# Patient Record
Sex: MALE | Race: WHITE | NOT HISPANIC OR LATINO | Employment: OTHER | URBAN - METROPOLITAN AREA
[De-identification: names, ages, dates, MRNs, and addresses within clinical notes are randomized per-mention and may not be internally consistent; named-entity substitution may affect disease eponyms.]

---

## 2017-01-04 ENCOUNTER — OFFICE VISIT (OUTPATIENT)
Dept: HEMATOLOGY/ONCOLOGY | Facility: CLINIC | Age: 62
End: 2017-01-04
Payer: MEDICARE

## 2017-01-04 ENCOUNTER — LAB VISIT (OUTPATIENT)
Dept: LAB | Facility: HOSPITAL | Age: 62
End: 2017-01-04
Attending: INTERNAL MEDICINE
Payer: MEDICARE

## 2017-01-04 VITALS
DIASTOLIC BLOOD PRESSURE: 77 MMHG | BODY MASS INDEX: 24.83 KG/M2 | WEIGHT: 163.81 LBS | HEIGHT: 68 IN | HEART RATE: 92 BPM | RESPIRATION RATE: 18 BRPM | SYSTOLIC BLOOD PRESSURE: 131 MMHG | TEMPERATURE: 99 F | OXYGEN SATURATION: 98 %

## 2017-01-04 DIAGNOSIS — C92.00 ACUTE MYELOID LEUKEMIA NOT HAVING ACHIEVED REMISSION: ICD-10-CM

## 2017-01-04 DIAGNOSIS — C92.02 ACUTE MYELOID LEUKEMIA IN RELAPSE: Primary | ICD-10-CM

## 2017-01-04 LAB
ANISOCYTOSIS BLD QL SMEAR: ABNORMAL
BASOPHILS # BLD AUTO: 0.01 K/UL
BASOPHILS NFR BLD: 0.5 %
DIFFERENTIAL METHOD: ABNORMAL
EOSINOPHIL # BLD AUTO: 0.1 K/UL
EOSINOPHIL NFR BLD: 5.4 %
ERYTHROCYTE [DISTWIDTH] IN BLOOD BY AUTOMATED COUNT: 24.9 %
HCT VFR BLD AUTO: 28.5 %
HGB BLD-MCNC: 9.5 G/DL
HYPOCHROMIA BLD QL SMEAR: ABNORMAL
LYMPHOCYTES # BLD AUTO: 1.7 K/UL
LYMPHOCYTES NFR BLD: 80.9 %
MCH RBC QN AUTO: 33.2 PG
MCHC RBC AUTO-ENTMCNC: 33.3 %
MCV RBC AUTO: 100 FL
MONOCYTES # BLD AUTO: 0.1 K/UL
MONOCYTES NFR BLD: 6.9 %
NEUTROPHILS # BLD AUTO: 0.1 K/UL
NEUTROPHILS NFR BLD: 6.3 %
PLATELET # BLD AUTO: 55 K/UL
PLATELET BLD QL SMEAR: ABNORMAL
PMV BLD AUTO: 10.3 FL
POIKILOCYTOSIS BLD QL SMEAR: SLIGHT
POLYCHROMASIA BLD QL SMEAR: ABNORMAL
RBC # BLD AUTO: 2.86 M/UL
SCHISTOCYTES BLD QL SMEAR: PRESENT
WBC # BLD AUTO: 2.04 K/UL

## 2017-01-04 PROCEDURE — 1159F MED LIST DOCD IN RCRD: CPT | Mod: S$GLB,,, | Performed by: INTERNAL MEDICINE

## 2017-01-04 PROCEDURE — 99214 OFFICE O/P EST MOD 30 MIN: CPT | Mod: S$GLB,,, | Performed by: INTERNAL MEDICINE

## 2017-01-04 PROCEDURE — 99999 PR PBB SHADOW E&M-EST. PATIENT-LVL III: CPT | Mod: PBBFAC,,, | Performed by: INTERNAL MEDICINE

## 2017-01-04 NOTE — PROGRESS NOTES
Subjective:       Patient ID: Carlos Jordan Jr. is a 61 y.o. male.    Chief Complaint: No chief complaint on file.    HPI This is a 61-year-old gentleman who comes for follow up of his AML.  He was found to have pancytopenia during a routine cbc done at the ER because of other complains.  A  bone marrow was done on 11/4/2016. 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  He is not interested in discussing a stem cell transplant .  He said he would prefer to b3 treated with a low toxicity agent like  Vidaza which would allow him to be treated as an outpatient.  He comes on C1  d16  He brought to my attention a cyst he has in the back of the neck which he says he has had for many years ALLERGIES: None.      MEDICATIONS: None.      PREVIOUS SURGERIES: Amputation of the tip of the right middle finger.      SOCIAL HISTORY: , has one child. He lives in [place]. He smokes half a  pack a day and has been doing this for 35 years. Denies significant drinking.   He is retired.      FAMILY HISTORY: Brother had brain tumor. Father had diabetes. No heart   attacks.      PAST MEDICAL HISTORY:  1. AML  2. Tobacco use.        Review of Systems    Objective:      Physical Exam   Constitutional: He is oriented to person, place, and time. He appears well-developed and well-nourished.   HENT:   Head: Normocephalic.   Mouth/Throat: No oropharyngeal exudate.   Eyes: Pupils are equal, round, and reactive to light.   Neck: No thyromegaly present.   Cardiovascular: Normal rate, regular rhythm and normal heart sounds.  Exam reveals no gallop.    No murmur heard.  Pulmonary/Chest: No respiratory distress. He has no wheezes. He has no rales.   Abdominal: Soft. Bowel sounds are normal. He exhibits no distension and no mass.  There is no rebound and no guarding.   Musculoskeletal: Normal range of motion. He exhibits no edema.   Lymphadenopathy:     He has no cervical adenopathy.   Neurological: He is alert and oriented to person, place, and time.   Skin: Skin is warm and dry.   Psychiatric: He has a normal mood and affect. His behavior is normal.       Wt Readings from Last 3 Encounters:   01/04/17 74.3 kg (163 lb 12.8 oz)   12/27/16 72.9 kg (160 lb 11.5 oz)   12/21/16 73.9 kg (162 lb 14.7 oz)     Temp Readings from Last 3 Encounters:   01/04/17 98.5 °F (36.9 °C) (Oral)   12/27/16 96.4 °F (35.8 °C) (Oral)   12/22/16 98 °F (36.7 °C)     BP Readings from Last 3 Encounters:   01/04/17 131/77   12/27/16 (!) 140/84   12/23/16 118/76     Pulse Readings from Last 3 Encounters:   01/04/17 92   12/27/16 74   12/23/16 72       Assessment:       1. Acute myeloid leukemia in relapse        Plan:       Lab Results   Component Value Date    WBC 2.04 (L) 01/04/2017    HGB 9.5 (L) 01/04/2017    HCT 28.5 (L) 01/04/2017     (H) 01/04/2017    PLT 55 (L) 01/04/2017     Counts acceptable for day 16.  No need for transfusion  See me in 7 days with a cbc

## 2017-01-11 ENCOUNTER — OFFICE VISIT (OUTPATIENT)
Dept: HEMATOLOGY/ONCOLOGY | Facility: CLINIC | Age: 62
End: 2017-01-11
Payer: MEDICARE

## 2017-01-11 ENCOUNTER — LAB VISIT (OUTPATIENT)
Dept: LAB | Facility: HOSPITAL | Age: 62
End: 2017-01-11
Attending: INTERNAL MEDICINE
Payer: MEDICARE

## 2017-01-11 VITALS
OXYGEN SATURATION: 97 % | BODY MASS INDEX: 24.49 KG/M2 | HEART RATE: 86 BPM | DIASTOLIC BLOOD PRESSURE: 80 MMHG | RESPIRATION RATE: 20 BRPM | WEIGHT: 161.63 LBS | TEMPERATURE: 98 F | HEIGHT: 68 IN | SYSTOLIC BLOOD PRESSURE: 138 MMHG

## 2017-01-11 DIAGNOSIS — C92.02 ACUTE MYELOID LEUKEMIA IN RELAPSE: ICD-10-CM

## 2017-01-11 DIAGNOSIS — C92.00 ACUTE MYELOID LEUKEMIA NOT HAVING ACHIEVED REMISSION: Primary | ICD-10-CM

## 2017-01-11 LAB
ANISOCYTOSIS BLD QL SMEAR: ABNORMAL
BASOPHILS # BLD AUTO: ABNORMAL K/UL
BASOPHILS NFR BLD: 1 %
DIFFERENTIAL METHOD: ABNORMAL
EOSINOPHIL # BLD AUTO: ABNORMAL K/UL
EOSINOPHIL NFR BLD: 1 %
ERYTHROCYTE [DISTWIDTH] IN BLOOD BY AUTOMATED COUNT: ABNORMAL %
HCT VFR BLD AUTO: 27.3 %
HGB BLD-MCNC: 8.9 G/DL
LYMPHOCYTES # BLD AUTO: ABNORMAL K/UL
LYMPHOCYTES NFR BLD: 80 %
MCH RBC QN AUTO: 34 PG
MCHC RBC AUTO-ENTMCNC: 32.6 %
MCV RBC AUTO: 104 FL
MONOCYTES # BLD AUTO: ABNORMAL K/UL
MONOCYTES NFR BLD: 5 %
NEUTROPHILS NFR BLD: 12 %
NEUTS BAND NFR BLD MANUAL: 1 %
PLATELET # BLD AUTO: 96 K/UL
PLATELET BLD QL SMEAR: ABNORMAL
PMV BLD AUTO: 9.6 FL
POIKILOCYTOSIS BLD QL SMEAR: SLIGHT
POLYCHROMASIA BLD QL SMEAR: ABNORMAL
RBC # BLD AUTO: 2.62 M/UL
WBC # BLD AUTO: 1.78 K/UL

## 2017-01-11 PROCEDURE — 99213 OFFICE O/P EST LOW 20 MIN: CPT | Mod: S$GLB,,, | Performed by: INTERNAL MEDICINE

## 2017-01-11 PROCEDURE — 99999 PR PBB SHADOW E&M-EST. PATIENT-LVL III: CPT | Mod: PBBFAC,,, | Performed by: INTERNAL MEDICINE

## 2017-01-11 PROCEDURE — 1159F MED LIST DOCD IN RCRD: CPT | Mod: S$GLB,,, | Performed by: INTERNAL MEDICINE

## 2017-01-11 NOTE — PROGRESS NOTES
Subjective:       Patient ID: Carlos Jordan Jr. is a 61 y.o. male.    Chief Complaint: No chief complaint on file.    HPI This is a 61-year-old gentleman who comes for follow up of his AML.  He was found to have pancytopenia during a routine cbc done at the ER because of other complains.  A bone marrow was done on 11/4/2016. 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  He is not interested in discussing a stem cell transplant .  He said he would prefer to b3 treated with a low toxicity agent like  Vidaza which would allow him to be treated as an outpatient.  He comes on C1  d23     ALLERGIES: None.      MEDICATIONS: None.      PREVIOUS SURGERIES: Amputation of the tip of the right middle finger.      SOCIAL HISTORY: , has one child. He lives in [place]. He smokes half a  pack a day and has been doing this for 35 years. Denies significant drinking.   He is retired.      FAMILY HISTORY: Brother had brain tumor. Father had diabetes. No heart   attacks.      PAST MEDICAL HISTORY:  1. AML  2. Tobacco use.      Review of Systems   Constitutional: Negative.  Negative for fatigue.   Eyes: Negative.    Cardiovascular: Negative.  Negative for chest pain.   Gastrointestinal: Negative for abdominal pain and nausea.   Genitourinary: Negative.  Negative for hematuria.   Musculoskeletal: Negative.    Skin: Negative.    Neurological: Negative.    Psychiatric/Behavioral: Negative.        Objective:      Physical Exam   Constitutional: He is oriented to person, place, and time. He appears well-developed and well-nourished.   HENT:   Head: Normocephalic.   Mouth/Throat: No oropharyngeal exudate.   Eyes: Pupils are equal, round, and reactive to light.   Neck: No thyromegaly present.   Cardiovascular: Normal rate,  regular rhythm and normal heart sounds.  Exam reveals no gallop.    No murmur heard.  Pulmonary/Chest: No respiratory distress. He has no wheezes. He has no rales.   Abdominal: Soft. Bowel sounds are normal. He exhibits no distension and no mass. There is no rebound and no guarding.   Musculoskeletal: Normal range of motion. He exhibits no edema.   Lymphadenopathy:     He has no cervical adenopathy.   Neurological: He is alert and oriented to person, place, and time.   Skin: Skin is warm and dry.   Psychiatric: He has a normal mood and affect. His behavior is normal.       Wt Readings from Last 3 Encounters:   01/11/17 73.3 kg (161 lb 9.6 oz)   01/04/17 74.3 kg (163 lb 12.8 oz)   12/27/16 72.9 kg (160 lb 11.5 oz)     Temp Readings from Last 3 Encounters:   01/11/17 98.1 °F (36.7 °C) (Oral)   01/04/17 98.5 °F (36.9 °C) (Oral)   12/27/16 96.4 °F (35.8 °C) (Oral)     BP Readings from Last 3 Encounters:   01/11/17 138/80   01/04/17 131/77   12/27/16 (!) 140/84     Pulse Readings from Last 3 Encounters:   01/11/17 86   01/04/17 92   12/27/16 74       Assessment:       1. Acute myeloid leukemia not having achieved remission        Plan:       Lab Results   Component Value Date    WBC 1.78 (LL) 01/11/2017    HGB 8.9 (L) 01/11/2017    HCT 27.3 (L) 01/11/2017     (H) 01/11/2017    PLT 96 (L) 01/11/2017       Values low but stable. NO need for transfusion.  See me Monday Jan 16 with a cbc and a cmp for start of c2 of Vidaza

## 2017-01-16 ENCOUNTER — INFUSION (OUTPATIENT)
Dept: INFUSION THERAPY | Facility: HOSPITAL | Age: 62
End: 2017-01-16
Attending: INTERNAL MEDICINE
Payer: MEDICARE

## 2017-01-16 ENCOUNTER — OFFICE VISIT (OUTPATIENT)
Dept: HEMATOLOGY/ONCOLOGY | Facility: CLINIC | Age: 62
End: 2017-01-16
Payer: MEDICARE

## 2017-01-16 VITALS
WEIGHT: 161.63 LBS | DIASTOLIC BLOOD PRESSURE: 70 MMHG | HEART RATE: 81 BPM | OXYGEN SATURATION: 98 % | SYSTOLIC BLOOD PRESSURE: 130 MMHG | BODY MASS INDEX: 24.49 KG/M2 | HEIGHT: 68 IN | TEMPERATURE: 98 F | RESPIRATION RATE: 18 BRPM | BODY MASS INDEX: 24.49 KG/M2 | WEIGHT: 161.63 LBS | HEIGHT: 68 IN

## 2017-01-16 DIAGNOSIS — C92.00 ACUTE MYELOID LEUKEMIA NOT HAVING ACHIEVED REMISSION: Primary | ICD-10-CM

## 2017-01-16 PROCEDURE — 99999 PR PBB SHADOW E&M-EST. PATIENT-LVL III: CPT | Mod: PBBFAC,,, | Performed by: INTERNAL MEDICINE

## 2017-01-16 PROCEDURE — 96401 CHEMO ANTI-NEOPL SQ/IM: CPT | Mod: PO

## 2017-01-16 PROCEDURE — 99214 OFFICE O/P EST MOD 30 MIN: CPT | Mod: 25,S$GLB,, | Performed by: INTERNAL MEDICINE

## 2017-01-16 PROCEDURE — 1159F MED LIST DOCD IN RCRD: CPT | Mod: S$GLB,,, | Performed by: INTERNAL MEDICINE

## 2017-01-16 PROCEDURE — 99499 UNLISTED E&M SERVICE: CPT | Mod: S$GLB,,, | Performed by: INTERNAL MEDICINE

## 2017-01-16 PROCEDURE — 25000003 PHARM REV CODE 250: Mod: PO | Performed by: INTERNAL MEDICINE

## 2017-01-16 PROCEDURE — 63600175 PHARM REV CODE 636 W HCPCS: Mod: JW,PO | Performed by: INTERNAL MEDICINE

## 2017-01-16 RX ORDER — ONDANSETRON 4 MG/1
8 TABLET, FILM COATED ORAL ONCE
Status: CANCELLED
Start: 2017-01-19 | End: 2017-01-19

## 2017-01-16 RX ORDER — AZACITIDINE 100 MG/1
75 INJECTION, POWDER, LYOPHILIZED, FOR SOLUTION INTRAVENOUS; SUBCUTANEOUS
Status: CANCELLED | OUTPATIENT
Start: 2017-01-18

## 2017-01-16 RX ORDER — ONDANSETRON 4 MG/1
8 TABLET, FILM COATED ORAL ONCE
Status: CANCELLED
Start: 2017-01-17 | End: 2017-01-17

## 2017-01-16 RX ORDER — AZACITIDINE 100 MG/1
75 INJECTION, POWDER, LYOPHILIZED, FOR SOLUTION INTRAVENOUS; SUBCUTANEOUS
Status: CANCELLED | OUTPATIENT
Start: 2017-01-19

## 2017-01-16 RX ORDER — AZACITIDINE 100 MG/1
75 INJECTION, POWDER, LYOPHILIZED, FOR SOLUTION INTRAVENOUS; SUBCUTANEOUS
Status: COMPLETED | OUTPATIENT
Start: 2017-01-16 | End: 2017-01-16

## 2017-01-16 RX ORDER — ONDANSETRON 4 MG/1
8 TABLET, FILM COATED ORAL ONCE
Status: COMPLETED | OUTPATIENT
Start: 2017-01-16 | End: 2017-01-16

## 2017-01-16 RX ORDER — AZACITIDINE 100 MG/1
75 INJECTION, POWDER, LYOPHILIZED, FOR SOLUTION INTRAVENOUS; SUBCUTANEOUS
Status: CANCELLED | OUTPATIENT
Start: 2017-01-20

## 2017-01-16 RX ORDER — ONDANSETRON 4 MG/1
8 TABLET, FILM COATED ORAL ONCE
Status: CANCELLED
Start: 2017-01-20 | End: 2017-01-20

## 2017-01-16 RX ORDER — ONDANSETRON 4 MG/1
8 TABLET, FILM COATED ORAL ONCE
Status: CANCELLED
Start: 2017-01-18 | End: 2017-01-18

## 2017-01-16 RX ORDER — AZACITIDINE 100 MG/1
75 INJECTION, POWDER, LYOPHILIZED, FOR SOLUTION INTRAVENOUS; SUBCUTANEOUS
Status: CANCELLED | OUTPATIENT
Start: 2017-01-17

## 2017-01-16 RX ORDER — ONDANSETRON 4 MG/1
8 TABLET, FILM COATED ORAL ONCE
Status: CANCELLED
Start: 2017-01-16 | End: 2017-01-16

## 2017-01-16 RX ORDER — AZACITIDINE 100 MG/1
75 INJECTION, POWDER, LYOPHILIZED, FOR SOLUTION INTRAVENOUS; SUBCUTANEOUS
Status: CANCELLED | OUTPATIENT
Start: 2017-01-16

## 2017-01-16 RX ADMIN — ONDANSETRON 8 MG: 4 TABLET, FILM COATED ORAL at 10:01

## 2017-01-16 RX ADMIN — AZACITIDINE 140 MG: 100 INJECTION, POWDER, LYOPHILIZED, FOR SOLUTION INTRAVENOUS; SUBCUTANEOUS at 11:01

## 2017-01-16 NOTE — PROGRESS NOTES
Subjective:       Patient ID: Carlos Jordan Jr. is a 61 y.o. male.    Chief Complaint: Follow-up    HPI This is a 61-year-old gentleman who comes for follow up of his AML.  He was found to have pancytopenia during a routine cbc done at the ER because of other complains.  A bone marrow was done on 11/4/2016. 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  He said he was  not interested in discussing a stem cell transplant .  He said he would prefer to be treated with a low toxicity agent like Vidaza which would allow him to be treated as an outpatient.  He comes on C2  d1  He has no complaints     ALLERGIES: None.      MEDICATIONS: None.      PREVIOUS SURGERIES: Amputation of the tip of the right middle finger.      SOCIAL HISTORY: , has one child. He lives in [place]. He smokes half a  pack a day and has been doing this for 35 years. Denies significant drinking.   He is retired.      FAMILY HISTORY: Brother had brain tumor. Father had diabetes. No heart   attacks.      PAST MEDICAL HISTORY:  1. AML  2. Tobacco use.      Review of Systems   Constitutional: Negative.  Negative for fatigue.   Eyes: Negative.    Cardiovascular: Negative.  Negative for chest pain.   Gastrointestinal: Negative for abdominal pain and nausea.   Genitourinary: Negative.  Negative for hematuria.   Musculoskeletal: Negative.    Skin: Negative.    Neurological: Negative.    Psychiatric/Behavioral: Negative.        Objective:      Physical Exam   Constitutional: He is oriented to person, place, and time. He appears well-developed and well-nourished.   HENT:   Head: Normocephalic.   Mouth/Throat: No oropharyngeal exudate.   Eyes: Pupils are equal, round, and reactive to light.   Neck: No thyromegaly present.   Cardiovascular:  Normal rate, regular rhythm and normal heart sounds.  Exam reveals no gallop.    No murmur heard.  Pulmonary/Chest: No respiratory distress. He has no wheezes. He has no rales.   Abdominal: Soft. Bowel sounds are normal. He exhibits no distension and no mass. There is no rebound and no guarding.   Musculoskeletal: Normal range of motion. He exhibits no edema.   Lymphadenopathy:     He has no cervical adenopathy.   Neurological: He is alert and oriented to person, place, and time.   Skin: Skin is warm and dry.   Psychiatric: He has a normal mood and affect. His behavior is normal.       Wt Readings from Last 3 Encounters:   01/16/17 73.3 kg (161 lb 9.6 oz)   01/11/17 73.3 kg (161 lb 9.6 oz)   01/04/17 74.3 kg (163 lb 12.8 oz)     Temp Readings from Last 3 Encounters:   01/16/17 98 °F (36.7 °C) (Oral)   01/11/17 98.1 °F (36.7 °C) (Oral)   01/04/17 98.5 °F (36.9 °C) (Oral)     BP Readings from Last 3 Encounters:   01/16/17 130/70   01/11/17 138/80   01/04/17 131/77     Pulse Readings from Last 3 Encounters:   01/16/17 81   01/11/17 86   01/04/17 92       Assessment:       1. Acute myeloid leukemia not having achieved remission        Plan:       Lab Results   Component Value Date    WBC 2.07 (L) 01/16/2017    HGB 10.2 (L) 01/16/2017    HCT 30.3 (L) 01/16/2017     (H) 01/16/2017    PLT 82 (L) 01/16/2017     He will start C2 of Vidaza today and see me in 7 days with a cbc.  No need for transfusions

## 2017-01-16 NOTE — MR AVS SNAPSHOT
Patient Information     Patient Name Sex Carlos Morales Jr. Male 1955      Visit Information        Provider Department Dep Phone Center    2017 11:00 AM [facility] [phone] [facility]      Patient Instructions     None      Your Current Medications Are     ondansetron (ZOFRAN) 8 MG tablet    valacyclovir (VALTREX) 500 MG tablet      Facility-Administered Medications     azacitidine injection 140 mg    ondansetron tablet 8 mg      Appointments for Next Year     2017 11:45 AM INFUSION 030 MIN (30 min.) [facility] CHAIR 01 [facility]    Arrive at check-in approximately 15 minutes before your scheduled appointment time. Bring all outside medical records and imaging, along with a list of your current medications and insurance card.    (off [facility]) [facility] floor    2017 10:00 AM INFUSION 030 MIN (30 min.) [facility] CHAIR 03 ON[facility]    Arrive at check-in approximately 15 minutes before your scheduled appointment time. Bring all outside medical records and imaging, along with a list of your current medications and insurance card.    (off [facility]) [facility] floor    2017 10:00 AM INFUSION 030 MIN (30 min.) [facility] CHAIR 05 ON[facility]    Arrive at check-in approximately 15 minutes before your scheduled appointment time. Bring all outside medical records and imaging, along with a list of your current medications and insurance card.    (off [facility]) [facility] floor    2017 10:00 AM INFUSION 030 MIN (30 min.) [facility] CHAIR 02 ON[facility]    Arrive at check-in approximately 15 minutes before your scheduled appointment time. Bring all outside medical records and imaging, along with a list of your current medications and insurance card.    (off [facility]) [facility] floor    2017 10:45 AM NON FASTING LAB (5 min.) [facility]    Arrive at check-in approximately 15 minutes before your  "scheduled appointment time. Bring all outside medical records and imaging, along with a list of your current medications and insurance card.    (off [address]) 2nd floor    1/23/2017 11:20 AM ESTABLISHED PATIENT (20 min.) [facility]ot[facility] Oncology Lavell Flor MD    Arrive at check-in approximately 15 minutes before your scheduled appointment time. Bring all outside medical records and imaging, along with a list of your current medications and insurance card.    (off [address]) 3rd Floor         Default Flowsheet Data (last 24 hours)      Amb Complex Vitals Rony        01/16/17 1039 01/16/17 1026             Measurements    Weight 73.3 kg (161 lb 9.6 oz) 73.3 kg (161 lb 9.6 oz)       Height 5' 8" (1.727 m) 5' 8" (1.727 m)       BSA (Calculated - sq m) 1.88 sq meters 1.88 sq meters       BMI (Calculated) 24.6 24.6       BP  130/70       Temp  98 °F (36.7 °C)       Pulse  81       Resp  18       SpO2  98 %       Pain Assessment    Pain Score Zero Zero               Allergies     No Known Allergies      Medications You Received from 01/15/2017 1116 to 01/16/2017 1116        Date/Time Order Dose Route Action     01/16/2017 1057 ondansetron tablet 8 mg 8 mg Oral Given      Current Discharge Medication List     Cannot display discharge medications since this is not an admission.      "

## 2017-01-16 NOTE — PLAN OF CARE
Problem: Patient Care Overview (Adult)  Goal: Plan of Care Review  Outcome: Ongoing (interventions implemented as appropriate)  Pt states he feels fine

## 2017-01-16 NOTE — PROGRESS NOTES
Patient, Carlos Jordan Jr. (MRN #[id number]), presented with a recent Platelet count less than 150 K/uL consistent with the definition of thrombocytopenia (ICD10 - D69.6).    Platelets   Date Value Ref Range Status   01/16/2017 82 (L) 150 - 350 K/uL Final     The patient's thrombocytopenia was monitored, evaluated, addressed and/or treated. This addendum to the medical record is made on 01/16/2017.

## 2017-01-17 ENCOUNTER — INFUSION (OUTPATIENT)
Dept: INFUSION THERAPY | Facility: HOSPITAL | Age: 62
End: 2017-01-17
Attending: INTERNAL MEDICINE
Payer: MEDICARE

## 2017-01-17 VITALS — WEIGHT: 161.63 LBS | BODY MASS INDEX: 24.49 KG/M2 | HEIGHT: 68 IN

## 2017-01-17 DIAGNOSIS — C92.00 ACUTE MYELOID LEUKEMIA NOT HAVING ACHIEVED REMISSION: Primary | ICD-10-CM

## 2017-01-17 PROCEDURE — 25000003 PHARM REV CODE 250: Performed by: INTERNAL MEDICINE

## 2017-01-17 PROCEDURE — 96401 CHEMO ANTI-NEOPL SQ/IM: CPT

## 2017-01-17 PROCEDURE — 63600175 PHARM REV CODE 636 W HCPCS: Mod: JW | Performed by: INTERNAL MEDICINE

## 2017-01-17 RX ORDER — ONDANSETRON HYDROCHLORIDE 8 MG/1
8 TABLET, FILM COATED ORAL ONCE
Status: COMPLETED | OUTPATIENT
Start: 2017-01-17 | End: 2017-01-17

## 2017-01-17 RX ORDER — AZACITIDINE 100 MG/1
75 INJECTION, POWDER, LYOPHILIZED, FOR SOLUTION INTRAVENOUS; SUBCUTANEOUS
Status: COMPLETED | OUTPATIENT
Start: 2017-01-17 | End: 2017-01-17

## 2017-01-17 RX ADMIN — ONDANSETRON 8 MG: 8 TABLET, FILM COATED ORAL at 10:01

## 2017-01-17 RX ADMIN — AZACITIDINE 140 MG: 100 INJECTION, POWDER, LYOPHILIZED, FOR SOLUTION INTRAVENOUS; SUBCUTANEOUS at 11:01

## 2017-01-17 NOTE — PLAN OF CARE
"Problem: Patient Care Overview (Adult)  Goal: Plan of Care Review  Outcome: Ongoing (interventions implemented as appropriate)  Pt states only c/o today is "tired".      "

## 2017-01-17 NOTE — NURSING
1/17/17  1200  Injection given without difficulties.  Patient instructed to stay in the clinic for 15 minutes. Patient verbalized understanding and will notify nurse with any complaints.
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
## 2017-01-18 ENCOUNTER — INFUSION (OUTPATIENT)
Dept: INFUSION THERAPY | Facility: HOSPITAL | Age: 62
End: 2017-01-18
Attending: INTERNAL MEDICINE
Payer: MEDICARE

## 2017-01-18 VITALS
DIASTOLIC BLOOD PRESSURE: 66 MMHG | RESPIRATION RATE: 20 BRPM | HEART RATE: 66 BPM | SYSTOLIC BLOOD PRESSURE: 115 MMHG | TEMPERATURE: 98 F

## 2017-01-18 DIAGNOSIS — C92.00 ACUTE MYELOID LEUKEMIA NOT HAVING ACHIEVED REMISSION: Primary | ICD-10-CM

## 2017-01-18 PROCEDURE — 25000003 PHARM REV CODE 250: Performed by: INTERNAL MEDICINE

## 2017-01-18 PROCEDURE — 63600175 PHARM REV CODE 636 W HCPCS: Mod: JW | Performed by: INTERNAL MEDICINE

## 2017-01-18 PROCEDURE — 96401 CHEMO ANTI-NEOPL SQ/IM: CPT

## 2017-01-18 RX ORDER — AZACITIDINE 100 MG/1
75 INJECTION, POWDER, LYOPHILIZED, FOR SOLUTION INTRAVENOUS; SUBCUTANEOUS
Status: COMPLETED | OUTPATIENT
Start: 2017-01-18 | End: 2017-01-18

## 2017-01-18 RX ORDER — ONDANSETRON HYDROCHLORIDE 8 MG/1
8 TABLET, FILM COATED ORAL ONCE
Status: COMPLETED | OUTPATIENT
Start: 2017-01-18 | End: 2017-01-18

## 2017-01-18 RX ADMIN — AZACITIDINE 140 MG: 100 INJECTION, POWDER, LYOPHILIZED, FOR SOLUTION INTRAVENOUS; SUBCUTANEOUS at 10:01

## 2017-01-18 RX ADMIN — ONDANSETRON 8 MG: 8 TABLET, FILM COATED ORAL at 10:01

## 2017-01-18 NOTE — PLAN OF CARE
Problem: Patient Care Overview (Adult)  Goal: Plan of Care Review  Outcome: Ongoing (interventions implemented as appropriate)  States he had a small amount of nausea last night.

## 2017-01-18 NOTE — PATIENT INSTRUCTIONS
[facility]  [address]  [address] [address]  [phone] phone     [phone] fax  Hours of Operation: Monday- Friday 8:00am- 5:00pm  After hours phone  [phone]  Hematology / Oncology Physicians on call      Dr. Tristan Gardiner    Please call with any concerns regarding your appointment today.    HOME CARE AFTER CHEMOTHERAPY   Meals   Many patients feel sick and lose their appetites during treatment. Eat small meals several times a day. Choose bland foods with little taste or smell if you have problems with nausea. Be sure to cook all food thoroughly. This kills bacteria and helps you avoid intestinal infection. Soft foods are easier to swallow and digest.   Activity   Exercise keeps you strong and keeps your heart and lungs active. Talk to your doctor about an appropriate exercise program for you.   Skin Care   To prevent a skin infection, bathe or shower once a day. Use a moisturizing soap and wash with warm water. Avoid very hot or cold water. Chemotherapy can make your skin dry . Apply moisturizing lotion to help relieve dry skin. Some drugs used in high doses can cause slight burns to appear (like sunburn). Ask for a special cream to help relieve the burn and protect your skin.   Prevent Mouth Sores   During chemotherapy, many people get mouth sores. Do the following to help prevent mouth sores or to ease discomfort.   Brush your teeth with a soft-bristle toothbrush after every meal.  Don't use dental floss if your platelet count is below 50,000. Your doctor or nurse will tell you if this is the case.  Use an oral swab or special soft toothbrush if your gums bleed during regular brushing.  Use mouthwash as directed. If you can't tolerate commercial mouthwash, use salt and baking soda to clean your mouth. Mix 1 teaspoon of salt and 1 teaspoon of baking soda into a glass of water. Swish and spit.  Call your doctor or  return to this facility if you develop any of the following:   Sore throat   White patches in the mouth or throat   Fever of 100.4ºF (38ºC) or higher, or as directed by your healthcare provider  © 9322-7762 [employer], [address] [place] [address], [place] [address]. All rights reserved. This information is not intended as a substitute for professional medical care. Always follow your healthcare professional's

## 2017-01-18 NOTE — MR AVS SNAPSHOT
Patient Information     Patient Name Sex Carlos Morales Jr. Male 1955      Visit Information        Provider Department Dept Phone Center    2017 10:00 AM [facility] [phone] O[facility]      Patient Instructions      [facility]  [address]  [address] [facility] [address]  [phone] phone     [phone] fax  Hours of Operation: Monday- Friday 8:00am- 5:00pm  After hours phone  [phone]  Hematology / Oncology Physicians on call      Dr. Tristan Gardiner    Please call with any concerns regarding your appointment today.    HOME CARE AFTER CHEMOTHERAPY   Meals   Many patients feel sick and lose their appetites during treatment. Eat small meals several times a day. Choose bland foods with little taste or smell if you have problems with nausea. Be sure to cook all food thoroughly. This kills bacteria and helps you avoid intestinal infection. Soft foods are easier to swallow and digest.   Activity   Exercise keeps you strong and keeps your heart and lungs active. Talk to your doctor about an appropriate exercise program for you.   Skin Care   To prevent a skin infection, bathe or shower once a day. Use a moisturizing soap and wash with warm water. Avoid very hot or cold water. Chemotherapy can make your skin dry . Apply moisturizing lotion to help relieve dry skin. Some drugs used in high doses can cause slight burns to appear (like sunburn). Ask for a special cream to help relieve the burn and protect your skin.   Prevent Mouth Sores   During chemotherapy, many people get mouth sores. Do the following to help prevent mouth sores or to ease discomfort.   Brush your teeth with a soft-bristle toothbrush after every meal.  Don't use dental floss if your platelet count is below 50,000. Your doctor or nurse will tell you if this is the case.  Use an oral swab or special soft toothbrush if  your gums bleed during regular brushing.  Use mouthwash as directed. If you can't tolerate commercial mouthwash, use salt and baking soda to clean your mouth. Mix 1 teaspoon of salt and 1 teaspoon of baking soda into a glass of water. Swish and spit.  Call your doctor or return to this facility if you develop any of the following:   Sore throat   White patches in the mouth or throat   Fever of 100.4ºF (38ºC) or higher, or as directed by your healthcare provider  © 1863-0243 [facility], [address] [place] [address]. All rights reserved. This information is not intended as a substitute for professional medical care. Always follow your healthcare professional's          Your Current Medications Are     ondansetron (ZOFRAN) 8 MG tablet    valacyclovir (VALTREX) 500 MG tablet      Facility-Administered Medications     azacitidine injection 140 mg    ondansetron tablet 8 mg      Appointments for Next Year     1/19/2017 10:00 AM INFUSION 030 MIN (30 min.) [facility]AIR 05 [facility]    Arrive at check-in approximately 15 minutes before your scheduled appointment time. Bring all outside medical records and imaging, along with a list of your current medications and insurance card.    (off [facility]) 1st floor    1/20/2017 10:00 AM INFUSION 030 MIN (30 min.) [facility] CHAIR 02 ONLH    Arrive at check-in approximately 15 minutes before your scheduled appointment time. Bring all outside medical records and imaging, along with a list of your current medications and insurance card.    (off [facility]) 1st floor    1/23/2017 10:45 AM NON FASTING LAB (5 min.) [facility]    Arrive at check-in approximately 15 minutes before your scheduled appointment time. Bring all outside medical records and imaging, along with a list of your current medications and insurance card.    (off [facility]) 2nd floor    1/23/2017 11:20 AM ESTABLISHED PATIENT (20  min.) [facility] Oncology Lavell Flor MD    Arrive at check-in approximately 15 minutes before your scheduled appointment time. Bring all outside medical records and imaging, along with a list of your current medications and insurance card.    (off [facility]) 3rd Floor         Default Flowsheet Data (last 24 hours)      Amb Complex Vitals Rony        01/18/17 0958 01/18/17 0946             Measurements    /66        Temp 98.2 °F (36.8 °C)        Pulse 66        Resp 20        Pain Assessment    Pain Score  Two       Pain Frequency 2  Continuous       Pain Loc  ABDOMEN   injection sites               Allergies     No Known Allergies      Medications You Received from 01/17/2017 1428 to 01/18/2017 1428        Date/Time Order Dose Route Action     01/18/2017 1038 azacitidine injection 140 mg 140 mg Subcutaneous Given     01/18/2017 1021 ondansetron tablet 8 mg 8 mg Oral Given      Current Discharge Medication List     Cannot display discharge medications since this is not an admission.      Follow-up and Disposition     Return in 1 day (on 1/19/2017).

## 2017-01-19 ENCOUNTER — INFUSION (OUTPATIENT)
Dept: INFUSION THERAPY | Facility: HOSPITAL | Age: 62
End: 2017-01-19
Attending: INTERNAL MEDICINE
Payer: MEDICARE

## 2017-01-19 VITALS
HEART RATE: 68 BPM | DIASTOLIC BLOOD PRESSURE: 73 MMHG | BODY MASS INDEX: 24.49 KG/M2 | RESPIRATION RATE: 20 BRPM | TEMPERATURE: 98 F | HEIGHT: 68 IN | WEIGHT: 161.63 LBS | SYSTOLIC BLOOD PRESSURE: 121 MMHG

## 2017-01-19 DIAGNOSIS — C92.00 ACUTE MYELOID LEUKEMIA NOT HAVING ACHIEVED REMISSION: Primary | ICD-10-CM

## 2017-01-19 PROCEDURE — 96401 CHEMO ANTI-NEOPL SQ/IM: CPT

## 2017-01-19 PROCEDURE — 63600175 PHARM REV CODE 636 W HCPCS: Mod: JW | Performed by: INTERNAL MEDICINE

## 2017-01-19 PROCEDURE — 25000003 PHARM REV CODE 250: Performed by: INTERNAL MEDICINE

## 2017-01-19 RX ORDER — AZACITIDINE 100 MG/1
75 INJECTION, POWDER, LYOPHILIZED, FOR SOLUTION INTRAVENOUS; SUBCUTANEOUS
Status: COMPLETED | OUTPATIENT
Start: 2017-01-19 | End: 2017-01-19

## 2017-01-19 RX ORDER — ONDANSETRON HYDROCHLORIDE 8 MG/1
8 TABLET, FILM COATED ORAL ONCE
Status: COMPLETED | OUTPATIENT
Start: 2017-01-19 | End: 2017-01-19

## 2017-01-19 RX ADMIN — ONDANSETRON 8 MG: 8 TABLET, FILM COATED ORAL at 10:01

## 2017-01-19 RX ADMIN — AZACITIDINE 140 MG: 100 INJECTION, POWDER, LYOPHILIZED, FOR SOLUTION INTRAVENOUS; SUBCUTANEOUS at 10:01

## 2017-01-19 NOTE — PLAN OF CARE
Problem: Patient Care Overview  Goal: Plan of Care Review  Outcome: Ongoing (interventions implemented as appropriate)  Pt states that he feels good but that his abdomen is hurting from the injections.

## 2017-01-19 NOTE — PATIENT INSTRUCTIONS
[facility]  [address]  [address] [address]  [phone] phone     [phone] fax  Hours of Operation: Monday- Friday 8:00am- 5:00pm  After hours phone  [phone]  Hematology / Oncology Physicians on call      Dr. Tristan Gardiner    Please call with any concerns regarding your appointment today.    FALL PREVENTION   Falls often occur due to slipping, tripping or losing your balance. Here are ways to reduce your risk of falling again.   Was there anything that caused your fall that can be fixed, removed or replaced?   Make your home safe by keeping walkways clear of objects you may trip over.   Use non-slip pads under rugs.   Do not walk in poorly lit areas.   Do not stand on chairs or wobbly ladders.   Use caution when reaching overhead or looking upward. This position can cause a loss of balance.   Be sure your shoes fit properly, have non-slip bottoms and are in good condition.   Be cautious when going up and down stairs, curbs, and when walking on uneven sidewalks.   If your balance is poor, consider using a cane or walker.   If your fall was related to alcohol use, stop or limit alcohol intake.   If your fall was related to use of sleeping medicines, talk to your doctor about this. You may need to reduce your dosage at bedtime if you awaken during the night to go to the bathroom.   To reduce the need for nighttime bathroom trips:   Avoid drinking fluids for several hours before going to bed   Empty your bladder before going to bed   Men can keep a urinal at the bedside   © 2825-0940 [employer], [address] [place] [address]. All rights reserved. This information is not intended as a substitute for professional medical care. Always follow your healthcare professional's instructions.    HOME CARE AFTER CHEMOTHERAPY   Meals   Many patients feel sick and lose their appetites during treatment. Eat small meals  several times a day. Choose bland foods with little taste or smell if you have problems with nausea. Be sure to cook all food thoroughly. This kills bacteria and helps you avoid intestinal infection. Soft foods are easier to swallow and digest.   Activity   Exercise keeps you strong and keeps your heart and lungs active. Talk to your doctor about an appropriate exercise program for you.   Skin Care   To prevent a skin infection, bathe or shower once a day. Use a moisturizing soap and wash with warm water. Avoid very hot or cold water. Chemotherapy can make your skin dry . Apply moisturizing lotion to help relieve dry skin. Some drugs used in high doses can cause slight burns to appear (like sunburn). Ask for a special cream to help relieve the burn and protect your skin.   Prevent Mouth Sores   During chemotherapy, many people get mouth sores. Do the following to help prevent mouth sores or to ease discomfort.   Brush your teeth with a soft-bristle toothbrush after every meal.  Don't use dental floss if your platelet count is below 50,000. Your doctor or nurse will tell you if this is the case.  Use an oral swab or special soft toothbrush if your gums bleed during regular brushing.  Use mouthwash as directed. If you can't tolerate commercial mouthwash, use salt and baking soda to clean your mouth. Mix 1 teaspoon of salt and 1 teaspoon of baking soda into a glass of water. Swish and spit.  Call your doctor or return to this facility if you develop any of the following:   Sore throat   White patches in the mouth or throat   Fever of 100.4ºF (38ºC) or higher, or as directed by your healthcare provider  © 6157-1526 [employer], [address] [place] [address], [place] [address]. All rights reserved. This information is not intended as a substitute for professional medical care. Always follow your healthcare professional's       YOU HAVE STARTED ON CHEMOTHERAPY. IF YOU EXPERIENCE ANY OF THE FOLLOWING PROBLEMS, CALL THE OFFICE  IMMEDIATELY.    *FEVER .0 OR GREATER    *CHILLS, ESPECIALLY SHAKING CHILLS, OR SWEATING    *A SEVERE COUGH OR SORE THROAT, OR SINUS PAIN/     PRESSURE    *REDNESS, SWELLING, OR TENDERNESS AROUND A WOUND,     SORE, PIMPLE, RECTAL AREA, OR IV SITE    *SORES OR ULCERS IN THE MOUTH    *BLISTERS ON THE LIPS OR SKIN    *FREQUENT URGENCY TO URINATE OR A BURNING FEELING   WHEN YOU URINATE    *BLOOD IN THE URINE OR STOOL    *ANY UNEXPLAINED BRUISING OR PROLONGED BLEEDING,     (NOSEBLEEDS OR BLEEDING GUMS)    *LOOSE BOWEL MOVEMENTS THAT DO NOT RESPOND TO     IMODIUM OR MORE THAN THREE TIMES A DAY    *VOMITING UNRESPONSIVE TO ANTINAUSEA MEDICINE    *ANY UNUSUAL PHYSICAL SYMPTOMS THAT BEGAN AFTER     CHEMOTHERAPY    DURING WEEKDAYS, CALL AND ASK TO SPEAK DIRECTLY TO A NURSE.  AT OTHER TIMES, CALL THE OFFICE PHONE NUMBER; THE ANSWERING SERVICE WILL CONTACT THE ON-CALL PHYSICIAN.  SOMEONE IS AVAILABLE 24 HOURS A DAY, SEVEN DAYS A WEEK.

## 2017-01-19 NOTE — MR AVS SNAPSHOT
Patient Information     Patient Name Sex Carlos Morales Jr. Male 1955      Visit Information        Provider Department Dept Phone Center    2017 10:00 AM [facility]fusion [phone] [facility]      Patient Instructions      [facility]  [address]  [address] [address]  [phone] phone     [phone] fax  Hours of Operation: Monday- Friday 8:00am- 5:00pm  After hours phone  [phone]  Hematology / Oncology Physicians on call      Dr. Tristan Gardiner    Please call with any concerns regarding your appointment today.    FALL PREVENTION   Falls often occur due to slipping, tripping or losing your balance. Here are ways to reduce your risk of falling again.   Was there anything that caused your fall that can be fixed, removed or replaced?   Make your home safe by keeping walkways clear of objects you may trip over.   Use non-slip pads under rugs.   Do not walk in poorly lit areas.   Do not stand on chairs or wobbly ladders.   Use caution when reaching overhead or looking upward. This position can cause a loss of balance.   Be sure your shoes fit properly, have non-slip bottoms and are in good condition.   Be cautious when going up and down stairs, curbs, and when walking on uneven sidewalks.   If your balance is poor, consider using a cane or walker.   If your fall was related to alcohol use, stop or limit alcohol intake.   If your fall was related to use of sleeping medicines, talk to your doctor about this. You may need to reduce your dosage at bedtime if you awaken during the night to go to the bathroom.   To reduce the need for nighttime bathroom trips:   Avoid drinking fluids for several hours before going to bed   Empty your bladder before going to bed   Men can keep a urinal at the bedside   © 2377-0879 [employer], [address] [place] [address], [place] [address]. All rights  reserved. This information is not intended as a substitute for professional medical care. Always follow your healthcare professional's instructions.    HOME CARE AFTER CHEMOTHERAPY   Meals   Many patients feel sick and lose their appetites during treatment. Eat small meals several times a day. Choose bland foods with little taste or smell if you have problems with nausea. Be sure to cook all food thoroughly. This kills bacteria and helps you avoid intestinal infection. Soft foods are easier to swallow and digest.   Activity   Exercise keeps you strong and keeps your heart and lungs active. Talk to your doctor about an appropriate exercise program for you.   Skin Care   To prevent a skin infection, bathe or shower once a day. Use a moisturizing soap and wash with warm water. Avoid very hot or cold water. Chemotherapy can make your skin dry . Apply moisturizing lotion to help relieve dry skin. Some drugs used in high doses can cause slight burns to appear (like sunburn). Ask for a special cream to help relieve the burn and protect your skin.   Prevent Mouth Sores   During chemotherapy, many people get mouth sores. Do the following to help prevent mouth sores or to ease discomfort.   Brush your teeth with a soft-bristle toothbrush after every meal.  Don't use dental floss if your platelet count is below 50,000. Your doctor or nurse will tell you if this is the case.  Use an oral swab or special soft toothbrush if your gums bleed during regular brushing.  Use mouthwash as directed. If you can't tolerate commercial mouthwash, use salt and baking soda to clean your mouth. Mix 1 teaspoon of salt and 1 teaspoon of baking soda into a glass of water. Swish and spit.  Call your doctor or return to this facility if you develop any of the following:   Sore throat   White patches in the mouth or throat   Fever of 100.4ºF (38ºC) or higher, or as directed by your healthcare provider  © 4792-1272 [employer], [address] [place] [address]  Road, [place] [address]. All rights reserved. This information is not intended as a substitute for professional medical care. Always follow your healthcare professional's       YOU HAVE STARTED ON CHEMOTHERAPY. IF YOU EXPERIENCE ANY OF THE FOLLOWING PROBLEMS, CALL THE OFFICE IMMEDIATELY.    *FEVER .0 OR GREATER    *CHILLS, ESPECIALLY SHAKING CHILLS, OR SWEATING    *A SEVERE COUGH OR SORE THROAT, OR SINUS PAIN/     PRESSURE    *REDNESS, SWELLING, OR TENDERNESS AROUND A WOUND,     SORE, PIMPLE, RECTAL AREA, OR IV SITE    *SORES OR ULCERS IN THE MOUTH    *BLISTERS ON THE LIPS OR SKIN    *FREQUENT URGENCY TO URINATE OR A BURNING FEELING   WHEN YOU URINATE    *BLOOD IN THE URINE OR STOOL    *ANY UNEXPLAINED BRUISING OR PROLONGED BLEEDING,     (NOSEBLEEDS OR BLEEDING GUMS)    *LOOSE BOWEL MOVEMENTS THAT DO NOT RESPOND TO     IMODIUM OR MORE THAN THREE TIMES A DAY    *VOMITING UNRESPONSIVE TO ANTINAUSEA MEDICINE    *ANY UNUSUAL PHYSICAL SYMPTOMS THAT BEGAN AFTER     CHEMOTHERAPY    DURING WEEKDAYS, CALL AND ASK TO SPEAK DIRECTLY TO A NURSE.  AT OTHER TIMES, CALL THE OFFICE PHONE NUMBER; THE ANSWERING SERVICE WILL CONTACT THE ON-CALL PHYSICIAN.  SOMEONE IS AVAILABLE 24 HOURS A DAY, SEVEN DAYS A WEEK.       Your Current Medications Are     ondansetron (ZOFRAN) 8 MG tablet    valacyclovir (VALTREX) 500 MG tablet      Facility-Administered Medications     azacitidine injection 140 mg    ondansetron tablet 8 mg      Appointments for Next Year     1/20/2017 10:00 AM INFUSION 030 MIN (30 min.) [facility]    Arrive at check-in approximately 15 minutes before your scheduled appointment time. Bring all outside medical records and imaging, along with a list of your current medications and insurance card.    (off [facility]) 1st floor    1/23/2017 10:45 AM NON FASTING LAB (5 min.) [facility]    Arrive at check-in approximately 15 minutes before your scheduled  "appointment time. Bring all outside medical records and imaging, along with a list of your current medications and insurance card.    (off [facility]) 2nd floor    1/23/2017 11:20 AM ESTABLISHED PATIENT (20 min.) [facility] Hemotology Oncology Lavell Flor MD    Arrive at check-in approximately 15 minutes before your scheduled appointment time. Bring all outside medical records and imaging, along with a list of your current medications and insurance card.    (off [facility]) 3rd Floor         Default Flowsheet Data (last 24 hours)      Amb Complex Vitals Rony        01/19/17 0959                Measurements    Weight 73.3 kg (161 lb 9.6 oz)        Height 5' 8" (1.727 m)        BSA (Calculated - sq m) 1.88 sq meters        BMI (Calculated) 24.6        /73        Temp 97.6 °F (36.4 °C)        Pulse 68        Resp 20        Pain Assessment    Pain Score Two        Pain Frequency 2 Continuous        Pain Loc ABDOMEN                Allergies     No Known Allergies      Medications You Received from 01/18/2017 1100 to 01/19/2017 1100        Date/Time Order Dose Route Action     01/19/2017 1053 azacitidine injection 140 mg 140 mg Subcutaneous Given     01/19/2017 1052 ondansetron tablet 8 mg 8 mg Oral Given      Current Discharge Medication List     Cannot display discharge medications since this is not an admission.      "

## 2017-01-20 ENCOUNTER — INFUSION (OUTPATIENT)
Dept: INFUSION THERAPY | Facility: HOSPITAL | Age: 62
End: 2017-01-20
Attending: INTERNAL MEDICINE
Payer: MEDICARE

## 2017-01-20 VITALS
DIASTOLIC BLOOD PRESSURE: 71 MMHG | RESPIRATION RATE: 18 BRPM | TEMPERATURE: 97 F | HEART RATE: 84 BPM | SYSTOLIC BLOOD PRESSURE: 135 MMHG

## 2017-01-20 DIAGNOSIS — C92.00 ACUTE MYELOID LEUKEMIA NOT HAVING ACHIEVED REMISSION: Primary | ICD-10-CM

## 2017-01-20 PROCEDURE — 96401 CHEMO ANTI-NEOPL SQ/IM: CPT

## 2017-01-20 PROCEDURE — 63600175 PHARM REV CODE 636 W HCPCS: Performed by: INTERNAL MEDICINE

## 2017-01-20 PROCEDURE — 25000003 PHARM REV CODE 250: Performed by: INTERNAL MEDICINE

## 2017-01-20 RX ORDER — ONDANSETRON HYDROCHLORIDE 8 MG/1
8 TABLET, FILM COATED ORAL ONCE
Status: COMPLETED | OUTPATIENT
Start: 2017-01-20 | End: 2017-01-20

## 2017-01-20 RX ORDER — AZACITIDINE 100 MG/1
75 INJECTION, POWDER, LYOPHILIZED, FOR SOLUTION INTRAVENOUS; SUBCUTANEOUS
Status: COMPLETED | OUTPATIENT
Start: 2017-01-20 | End: 2017-01-20

## 2017-01-20 RX ADMIN — AZACITIDINE 140 MG: 100 INJECTION, POWDER, LYOPHILIZED, FOR SOLUTION INTRAVENOUS; SUBCUTANEOUS at 10:01

## 2017-01-20 RX ADMIN — ONDANSETRON 8 MG: 8 TABLET, FILM COATED ORAL at 09:01

## 2017-01-20 NOTE — MR AVS SNAPSHOT
Patient Information     Patient Name Sex Carlos Morales Jr. Male 1955      Visit Information        Provider Department Dept Phone Center    2017 10:00 AM [facility] On Chemotherapy Infusion [phone] [facility]      Patient Instructions      [facility]  [address]  [address] [address]  [phone] phone     [phone] fax  Hours of Operation: Monday- Friday 8:00am- 5:00pm  After hours phone  [phone]  Hematology / Oncology Physicians on call      Dr. Tristan Gardiner    Please call with any concerns regarding your appointment today.FALL PREVENTION   Falls often occur due to slipping, tripping or losing your balance. Here are ways to reduce your risk of falling again.   Was there anything that caused your fall that can be fixed, removed or replaced?   Make your home safe by keeping walkways clear of objects you may trip over.   Use non-slip pads under rugs.   Do not walk in poorly lit areas.   Do not stand on chairs or wobbly ladders.   Use caution when reaching overhead or looking upward. This position can cause a loss of balance.   Be sure your shoes fit properly, have non-slip bottoms and are in good condition.   Be cautious when going up and down stairs, curbs, and when walking on uneven sidewalks.   If your balance is poor, consider using a cane or walker.   If your fall was related to alcohol use, stop or limit alcohol intake.   If your fall was related to use of sleeping medicines, talk to your doctor about this. You may need to reduce your dosage at bedtime if you awaken during the night to go to the bathroom.   To reduce the need for nighttime bathroom trips:   Avoid drinking fluids for several hours before going to bed   Empty your bladder before going to bed   Men can keep a urinal at the bedside   © 3357-9374 [employer], [address] [place] [address], [place] [address]. All rights  reserved. This information is not intended as a substitute for professional medical care. Always follow your healthcare professional's instructions.  HOME CARE AFTER CHEMOTHERAPY   Meals   Many patients feel sick and lose their appetites during treatment. Eat small meals several times a day. Choose bland foods with little taste or smell if you have problems with nausea. Be sure to cook all food thoroughly. This kills bacteria and helps you avoid intestinal infection. Soft foods are easier to swallow and digest.   Activity   Exercise keeps you strong and keeps your heart and lungs active. Talk to your doctor about an appropriate exercise program for you.   Skin Care   To prevent a skin infection, bathe or shower once a day. Use a moisturizing soap and wash with warm water. Avoid very hot or cold water. Chemotherapy can make your skin dry . Apply moisturizing lotion to help relieve dry skin. Some drugs used in high doses can cause slight burns to appear (like sunburn). Ask for a special cream to help relieve the burn and protect your skin.   Prevent Mouth Sores   During chemotherapy, many people get mouth sores. Do the following to help prevent mouth sores or to ease discomfort.   Brush your teeth with a soft-bristle toothbrush after every meal.  Don't use dental floss if your platelet count is below 50,000. Your doctor or nurse will tell you if this is the case.  Use an oral swab or special soft toothbrush if your gums bleed during regular brushing.  Use mouthwash as directed. If you can't tolerate commercial mouthwash, use salt and baking soda to clean your mouth. Mix 1 teaspoon of salt and 1 teaspoon of baking soda into a glass of water. Swish and spit.  Call your doctor or return to this facility if you develop any of the following:   Sore throat   White patches in the mouth or throat   Fever of 100.4ºF (38ºC) or higher, or as directed by your healthcare provider  © 0549-5459 [employer], [address] [place] [address]  Road, [place] [address]. All rights reserved. This information is not intended as a substitute for professional medical care. Always follow your healthcare professional's   WAYS TO HELP PREVENT INFECTION         WASH YOUR HANDS OFTEN DURING THE DAY, ESPECIALLY BEFORE YOU EAT, AFTER USING THE BATHROOM, AND AFTER TOUCHING ANIMALS     STAY AWAY FROM PEOPLE WHO HAVE ILLNESSES YOU CAN CATCH; SUCH AS COLDS, FLU, CHICKEN POX     TRY TO AVOID CROWDS     STAY AWAY FROM CHILDREN WHO RECENTLY HAVE RECEIVED LIVE VIRUS VACCINES     MAINTAIN GOOD MOUTH CARE     DO NOT SQUEEZE OR SCRATCH PIMPLES     CLEAN CUTS & SCRAPES RIGHT AWAY AND DAILY UNTIL HEALED WITH WARM WATER, SOAP & AN ANTISEPTIC     AVOID CONTACT WITH LITTER BOXES, BIRD CAGES, & FISH TANKS     AVOID STANDING WATER, IE., BIRD BATHS, FLOWER POTS/VASES, OR HUMIDIFIERS     WEAR GLOVES WHEN GARDENING OR CLEANING UP AFTER OTHERS, ESPECIALLY BABIES & SMALL CHILDREN     DO NOT EAT RAW FISH, SEAFOOD, MEAT, OR EGGS       Your Current Medications Are     ondansetron (ZOFRAN) 8 MG tablet    valacyclovir (VALTREX) 500 MG tablet      Facility-Administered Medications     azacitidine injection 140 mg    azacitidine injection 140 mg    ondansetron tablet 8 mg    ondansetron tablet 8 mg      Appointments for Next Year     1/23/2017 10:45 AM NON FASTING LAB (5 min.) [facility]    Arrive at check-in approximately 15 minutes before your scheduled appointment time. Bring all outside medical records and imaging, along with a list of your current medications and insurance card.    (off [facility]) 2nd floor    1/23/2017 11:20 AM ESTABLISHED PATIENT (20 min.) [facility] - Hemotology Oncology Lavell Flor MD    Arrive at check-in approximately 15 minutes before your scheduled appointment time. Bring all outside medical records and imaging, along with a list of your current medications and insurance card.    (off [facility]) 3rd Floor          Default Flowsheet Data (last 24 hours)      Amb Complex Vitals Rony        01/20/17 0938                Measurements    /71        Temp 97.1 °F (36.2 °C)        Pulse 84        Resp 18        Pain Assessment    Pain Score Two        Pain Frequency 2 Intermittent        Pain Loc ABDOMEN                Allergies     No Known Allergies      Medications You Received from 01/19/2017 1019 to 01/20/2017 1019        Date/Time Order Dose Route Action     01/20/2017 1013 azacitidine injection 140 mg 140 mg Subcutaneous Given     01/20/2017 0947 ondansetron tablet 8 mg 8 mg Oral Given      Current Discharge Medication List     Cannot display discharge medications since this is not an admission.

## 2017-01-20 NOTE — PATIENT INSTRUCTIONS
[facility]  [address]  [address]  [phone] phone     [phone] fax  Hours of Operation: Monday- Friday 8:00am- 5:00pm  After hours phone  [phone]  Hematology / Oncology Physicians on call      Dr. Tristan Gardiner    Please call with any concerns regarding your appointment today.FALL PREVENTION   Falls often occur due to slipping, tripping or losing your balance. Here are ways to reduce your risk of falling again.   Was there anything that caused your fall that can be fixed, removed or replaced?   Make your home safe by keeping walkways clear of objects you may trip over.   Use non-slip pads under rugs.   Do not walk in poorly lit areas.   Do not stand on chairs or wobbly ladders.   Use caution when reaching overhead or looking upward. This position can cause a loss of balance.   Be sure your shoes fit properly, have non-slip bottoms and are in good condition.   Be cautious when going up and down stairs, curbs, and when walking on uneven sidewalks.   If your balance is poor, consider using a cane or walker.   If your fall was related to alcohol use, stop or limit alcohol intake.   If your fall was related to use of sleeping medicines, talk to your doctor about this. You may need to reduce your dosage at bedtime if you awaken during the night to go to the bathroom.   To reduce the need for nighttime bathroom trips:   Avoid drinking fluids for several hours before going to bed   Empty your bladder before going to bed   Men can keep a urinal at the bedside   © 8778-1307 [employer], [address] [place] [address], [place] [address]. All rights reserved. This information is not intended as a substitute for professional medical care. Always follow your healthcare professional's instructions.  HOME CARE AFTER CHEMOTHERAPY   Meals   Many patients feel sick and lose their appetites during treatment. Eat small meals several  times a day. Choose bland foods with little taste or smell if you have problems with nausea. Be sure to cook all food thoroughly. This kills bacteria and helps you avoid intestinal infection. Soft foods are easier to swallow and digest.   Activity   Exercise keeps you strong and keeps your heart and lungs active. Talk to your doctor about an appropriate exercise program for you.   Skin Care   To prevent a skin infection, bathe or shower once a day. Use a moisturizing soap and wash with warm water. Avoid very hot or cold water. Chemotherapy can make your skin dry . Apply moisturizing lotion to help relieve dry skin. Some drugs used in high doses can cause slight burns to appear (like sunburn). Ask for a special cream to help relieve the burn and protect your skin.   Prevent Mouth Sores   During chemotherapy, many people get mouth sores. Do the following to help prevent mouth sores or to ease discomfort.   Brush your teeth with a soft-bristle toothbrush after every meal.  Don't use dental floss if your platelet count is below 50,000. Your doctor or nurse will tell you if this is the case.  Use an oral swab or special soft toothbrush if your gums bleed during regular brushing.  Use mouthwash as directed. If you can't tolerate commercial mouthwash, use salt and baking soda to clean your mouth. Mix 1 teaspoon of salt and 1 teaspoon of baking soda into a glass of water. Swish and spit.  Call your doctor or return to this facility if you develop any of the following:   Sore throat   White patches in the mouth or throat   Fever of 100.4ºF (38ºC) or higher, or as directed by your healthcare provider  © 1817-1036 [employer], [address] [place] [address], [place] [address]. All rights reserved. This information is not intended as a substitute for professional medical care. Always follow your healthcare professional's   WAYS TO HELP PREVENT INFECTION         WASH YOUR HANDS OFTEN DURING THE DAY, ESPECIALLY BEFORE YOU EAT, AFTER  USING THE BATHROOM, AND AFTER TOUCHING ANIMALS     STAY AWAY FROM PEOPLE WHO HAVE ILLNESSES YOU CAN CATCH; SUCH AS COLDS, FLU, CHICKEN POX     TRY TO AVOID CROWDS     STAY AWAY FROM CHILDREN WHO RECENTLY HAVE RECEIVED LIVE VIRUS VACCINES     MAINTAIN GOOD MOUTH CARE     DO NOT SQUEEZE OR SCRATCH PIMPLES     CLEAN CUTS & SCRAPES RIGHT AWAY AND DAILY UNTIL HEALED WITH WARM WATER, SOAP & AN ANTISEPTIC     AVOID CONTACT WITH LITTER BOXES, BIRD CAGES, & FISH TANKS     AVOID STANDING WATER, IE., BIRD BATHS, FLOWER POTS/VASES, OR HUMIDIFIERS     WEAR GLOVES WHEN GARDENING OR CLEANING UP AFTER OTHERS, ESPECIALLY BABIES & SMALL CHILDREN     DO NOT EAT RAW FISH, SEAFOOD, MEAT, OR EGGS

## 2017-01-20 NOTE — PLAN OF CARE
Problem: Patient Care Overview  Goal: Plan of Care Review  Outcome: Ongoing (interventions implemented as appropriate)  i don't have any problems except this abdomen . It hurts where they give me my shots

## 2017-01-20 NOTE — NURSING
Injection given without difficulties.Bandaid applied. Patient instructed to stay in the clinic for 15 minutes. Patient verbalized understanding and will notify nurse with any complaints.

## 2017-01-23 ENCOUNTER — OFFICE VISIT (OUTPATIENT)
Dept: HEMATOLOGY/ONCOLOGY | Facility: CLINIC | Age: 62
End: 2017-01-23
Payer: MEDICARE

## 2017-01-23 ENCOUNTER — LAB VISIT (OUTPATIENT)
Dept: LAB | Facility: HOSPITAL | Age: 62
End: 2017-01-23
Attending: INTERNAL MEDICINE
Payer: MEDICARE

## 2017-01-23 VITALS
OXYGEN SATURATION: 98 % | SYSTOLIC BLOOD PRESSURE: 120 MMHG | HEIGHT: 68 IN | RESPIRATION RATE: 16 BRPM | TEMPERATURE: 98 F | WEIGHT: 160.06 LBS | HEART RATE: 89 BPM | DIASTOLIC BLOOD PRESSURE: 62 MMHG | BODY MASS INDEX: 24.26 KG/M2

## 2017-01-23 DIAGNOSIS — D69.6 THROMBOCYTOPENIA: ICD-10-CM

## 2017-01-23 DIAGNOSIS — C92.00 ACUTE MYELOID LEUKEMIA NOT HAVING ACHIEVED REMISSION: ICD-10-CM

## 2017-01-23 DIAGNOSIS — C92.00 ACUTE MYELOID LEUKEMIA NOT HAVING ACHIEVED REMISSION: Primary | ICD-10-CM

## 2017-01-23 LAB
ANISOCYTOSIS BLD QL SMEAR: ABNORMAL
BASOPHILS NFR BLD: 0 %
DACRYOCYTES BLD QL SMEAR: ABNORMAL
DIFFERENTIAL METHOD: ABNORMAL
EOSINOPHIL NFR BLD: 6 %
ERYTHROCYTE [DISTWIDTH] IN BLOOD BY AUTOMATED COUNT: ABNORMAL %
HCT VFR BLD AUTO: 28.2 %
HGB BLD-MCNC: 9.5 G/DL
HYPOCHROMIA BLD QL SMEAR: ABNORMAL
LYMPHOCYTES NFR BLD: 68 %
MCH RBC QN AUTO: 35.6 PG
MCHC RBC AUTO-ENTMCNC: 33.7 %
MCV RBC AUTO: 106 FL
MONOCYTES NFR BLD: 16 %
NEUTROPHILS NFR BLD: 10 %
PLATELET # BLD AUTO: 43 K/UL
PLATELET BLD QL SMEAR: ABNORMAL
PMV BLD AUTO: 9.5 FL
POIKILOCYTOSIS BLD QL SMEAR: SLIGHT
POLYCHROMASIA BLD QL SMEAR: ABNORMAL
RBC # BLD AUTO: 2.67 M/UL
SCHISTOCYTES BLD QL SMEAR: PRESENT
STOMATOCYTES BLD QL SMEAR: PRESENT
WBC # BLD AUTO: 1.9 K/UL

## 2017-01-23 PROCEDURE — 1159F MED LIST DOCD IN RCRD: CPT | Mod: S$GLB,,, | Performed by: INTERNAL MEDICINE

## 2017-01-23 PROCEDURE — 99213 OFFICE O/P EST LOW 20 MIN: CPT | Mod: S$GLB,,, | Performed by: INTERNAL MEDICINE

## 2017-01-23 PROCEDURE — 85027 COMPLETE CBC AUTOMATED: CPT | Mod: PO

## 2017-01-23 PROCEDURE — 85007 BL SMEAR W/DIFF WBC COUNT: CPT | Mod: PO

## 2017-01-23 PROCEDURE — 99999 PR PBB SHADOW E&M-EST. PATIENT-LVL III: CPT | Mod: PBBFAC,,, | Performed by: INTERNAL MEDICINE

## 2017-01-23 PROCEDURE — 36415 COLL VENOUS BLD VENIPUNCTURE: CPT | Mod: PO

## 2017-01-23 NOTE — PROGRESS NOTES
Subjective:       Patient ID: Carlos Jordan Jr. is a 61 y.o. male.    Chief Complaint: Follow-up    HPI This is a 61-year-old gentleman who comes for follow up of his AML.  He was found to have pancytopenia during a routine cbc done at the ER because of other complains.  A bone marrow was done on 11/4/2016. 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  He said he was  not interested in discussing a stem cell transplant .  He said he would prefer to be treated with a low toxicity agent like Vidaza which would allow him to be treated as an outpatient.  He comes on C2  d8 of VIDAZA  He has no complaints      ALLERGIES: None.      MEDICATIONS: None.      PREVIOUS SURGERIES: Amputation of the tip of the right middle finger.      SOCIAL HISTORY: , has one child. He lives in [place]. He smokes half a  pack a day and has been doing this for 35 years. Denies significant drinking.   He is retired.      FAMILY HISTORY: Brother had brain tumor. Father had diabetes. No heart   attacks.      PAST MEDICAL HISTORY:  1. AML  2. Tobacco use.    3-thrombocytopenia  Review of Systems   Constitutional: Negative.  Negative for fatigue.   Eyes: Negative.    Cardiovascular: Negative.  Negative for chest pain.   Gastrointestinal: Negative for abdominal pain and nausea.   Genitourinary: Negative.  Negative for hematuria.   Musculoskeletal: Negative.    Skin: Negative.    Neurological: Negative.    Psychiatric/Behavioral: Negative.        Objective:      Physical Exam   Constitutional: He is oriented to person, place, and time. He appears well-developed and well-nourished.   HENT:   Head: Normocephalic.   Mouth/Throat: No oropharyngeal exudate.   Eyes: Pupils are equal, round, and reactive to light.   Neck: No thyromegaly  present.   Cardiovascular: Normal rate, regular rhythm and normal heart sounds.  Exam reveals no gallop.    No murmur heard.  Pulmonary/Chest: No respiratory distress. He has no wheezes. He has no rales.   Abdominal: Soft. Bowel sounds are normal. He exhibits no distension and no mass. There is no rebound and no guarding.   Musculoskeletal: Normal range of motion. He exhibits no edema.   Lymphadenopathy:     He has no cervical adenopathy.   Neurological: He is alert and oriented to person, place, and time.   Skin: Skin is warm and dry.   Psychiatric: He has a normal mood and affect. His behavior is normal.       Wt Readings from Last 3 Encounters:   01/23/17 72.6 kg (160 lb 0.9 oz)   01/19/17 73.3 kg (161 lb 9.6 oz)   01/17/17 73.3 kg (161 lb 9.6 oz)     Temp Readings from Last 3 Encounters:   01/23/17 97.9 °F (36.6 °C) (Oral)   01/20/17 97.1 °F (36.2 °C)   01/19/17 97.6 °F (36.4 °C)     BP Readings from Last 3 Encounters:   01/23/17 120/62   01/20/17 135/71   01/19/17 121/73     Pulse Readings from Last 3 Encounters:   01/23/17 89   01/20/17 84   01/19/17 68       Assessment:       1. Acute myeloid leukemia not having achieved remission      2-thrombcytopneia  Plan:       Lab Results   Component Value Date    WBC 1.90 (LL) 01/23/2017    HGB 9.5 (L) 01/23/2017    HCT 28.2 (L) 01/23/2017     (H) 01/23/2017    PLT 43 (L) 01/23/2017      Counts reviewed. Platelets down as expected. No need for transfusion.  See me in 8 days with a cbc

## 2017-01-31 ENCOUNTER — OFFICE VISIT (OUTPATIENT)
Dept: HEMATOLOGY/ONCOLOGY | Facility: CLINIC | Age: 62
End: 2017-01-31
Payer: MEDICARE

## 2017-01-31 VITALS
HEIGHT: 68 IN | HEART RATE: 82 BPM | BODY MASS INDEX: 24.09 KG/M2 | OXYGEN SATURATION: 99 % | SYSTOLIC BLOOD PRESSURE: 128 MMHG | WEIGHT: 158.94 LBS | TEMPERATURE: 98 F | RESPIRATION RATE: 20 BRPM | DIASTOLIC BLOOD PRESSURE: 80 MMHG

## 2017-01-31 DIAGNOSIS — C92.00 ACUTE MYELOID LEUKEMIA NOT HAVING ACHIEVED REMISSION: Primary | ICD-10-CM

## 2017-01-31 PROCEDURE — 99214 OFFICE O/P EST MOD 30 MIN: CPT | Mod: S$GLB,,, | Performed by: INTERNAL MEDICINE

## 2017-01-31 PROCEDURE — 99999 PR PBB SHADOW E&M-EST. PATIENT-LVL III: CPT | Mod: PBBFAC,,, | Performed by: INTERNAL MEDICINE

## 2017-01-31 PROCEDURE — 1159F MED LIST DOCD IN RCRD: CPT | Mod: S$GLB,,, | Performed by: INTERNAL MEDICINE

## 2017-01-31 NOTE — PROGRESS NOTES
Subjective:       Patient ID: Carlos Jordan Jr. is a 61 y.o. male.    Chief Complaint: No chief complaint on file.    HPI This is a 61-year-old gentleman who comes for follow up of his AML.  He was found to have pancytopenia during a routine cbc done at the ER because of other complains.  A bone marrow was done on 11/4/2016. 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  He said he was not interested in discussing a stem cell transplant .  He said he would prefer to be treated with a low toxicity agent like Vidaza which would allow him to be treated as an outpatient.  He comes on C2  d15of VIDAZA  He has no complaints      ALLERGIES: None.      MEDICATIONS: None.      PREVIOUS SURGERIES: Amputation of the tip of the right middle finger.      SOCIAL HISTORY: , has one child. He lives in [place]. He smokes half a  pack a day and has been doing this for 35 years. Denies significant drinking.   He is retired.      FAMILY HISTORY: Brother had brain tumor. Father had diabetes. No heart   attacks.      PAST MEDICAL HISTORY:  1. AML  2. Tobacco use.    3-thrombocytopenia  Review of Systems   Constitutional: Negative.  Negative for fatigue.   Eyes: Negative.    Cardiovascular: Negative.  Negative for chest pain.   Gastrointestinal: Negative for abdominal pain and nausea.   Genitourinary: Negative.  Negative for hematuria.   Musculoskeletal: Negative.    Skin: Negative.    Neurological: Negative.    Psychiatric/Behavioral: Negative.        Objective:      Physical Exam   Constitutional: He is oriented to person, place, and time. He appears well-developed and well-nourished.   HENT:   Head: Normocephalic.   Mouth/Throat: No oropharyngeal exudate.   Eyes: Pupils are equal, round, and reactive to light.    Neck: No thyromegaly present.   Cardiovascular: Normal rate, regular rhythm and normal heart sounds.  Exam reveals no gallop.    No murmur heard.  Pulmonary/Chest: No respiratory distress. He has no wheezes. He has no rales.   Abdominal: Soft. Bowel sounds are normal. He exhibits no distension and no mass. There is no rebound and no guarding.   Musculoskeletal: Normal range of motion. He exhibits no edema.   Lymphadenopathy:     He has no cervical adenopathy.   Neurological: He is alert and oriented to person, place, and time.   Skin: Skin is warm and dry.   Psychiatric: He has a normal mood and affect. His behavior is normal.       Wt Readings from Last 3 Encounters:   01/31/17 72.1 kg (158 lb 15.2 oz)   01/23/17 72.6 kg (160 lb 0.9 oz)   01/19/17 73.3 kg (161 lb 9.6 oz)     Temp Readings from Last 3 Encounters:   01/31/17 97.5 °F (36.4 °C) (Oral)   01/23/17 97.9 °F (36.6 °C) (Oral)   01/20/17 97.1 °F (36.2 °C)     BP Readings from Last 3 Encounters:   01/31/17 128/80   01/23/17 120/62   01/20/17 135/71     Pulse Readings from Last 3 Encounters:   01/31/17 82   01/23/17 89   01/20/17 84       Assessment:       1. Acute myeloid leukemia not having achieved remission        Plan:       Lab Results   Component Value Date    WBC 1.49 (LL) 01/31/2017    HGB 9.0 (L) 01/31/2017    HCT 26.7 (L) 01/31/2017     (H) 01/31/2017    PLT 56 (L) 01/31/2017       Counts are down, but there is no need for transfusions.  Will have him see me in 7 days with a  cbc     Continue prophylactic antibiotics

## 2017-02-07 ENCOUNTER — OFFICE VISIT (OUTPATIENT)
Dept: HEMATOLOGY/ONCOLOGY | Facility: CLINIC | Age: 62
End: 2017-02-07
Payer: MEDICARE

## 2017-02-07 ENCOUNTER — LAB VISIT (OUTPATIENT)
Dept: LAB | Facility: HOSPITAL | Age: 62
End: 2017-02-07
Attending: INTERNAL MEDICINE
Payer: MEDICARE

## 2017-02-07 VITALS
HEART RATE: 89 BPM | HEIGHT: 68 IN | DIASTOLIC BLOOD PRESSURE: 68 MMHG | WEIGHT: 160.25 LBS | SYSTOLIC BLOOD PRESSURE: 114 MMHG | RESPIRATION RATE: 18 BRPM | OXYGEN SATURATION: 99 % | TEMPERATURE: 98 F | BODY MASS INDEX: 24.29 KG/M2

## 2017-02-07 DIAGNOSIS — C92.02 ACUTE MYELOID LEUKEMIA IN RELAPSE: ICD-10-CM

## 2017-02-07 DIAGNOSIS — D69.6 THROMBOCYTOPENIA: ICD-10-CM

## 2017-02-07 DIAGNOSIS — C92.00 ACUTE MYELOID LEUKEMIA NOT HAVING ACHIEVED REMISSION: Primary | ICD-10-CM

## 2017-02-07 DIAGNOSIS — D61.818 PANCYTOPENIA: ICD-10-CM

## 2017-02-07 DIAGNOSIS — C92.00 ACUTE MYELOID LEUKEMIA NOT HAVING ACHIEVED REMISSION: ICD-10-CM

## 2017-02-07 LAB
ANISOCYTOSIS BLD QL SMEAR: ABNORMAL
BASOPHILS # BLD AUTO: ABNORMAL K/UL
BASOPHILS NFR BLD: 1 %
DIFFERENTIAL METHOD: ABNORMAL
EOSINOPHIL # BLD AUTO: ABNORMAL K/UL
EOSINOPHIL NFR BLD: 5 %
ERYTHROCYTE [DISTWIDTH] IN BLOOD BY AUTOMATED COUNT: 20.3 %
HCT VFR BLD AUTO: 25.8 %
HGB BLD-MCNC: 8.7 G/DL
LYMPHOCYTES # BLD AUTO: ABNORMAL K/UL
LYMPHOCYTES NFR BLD: 71 %
MCH RBC QN AUTO: 38.2 PG
MCHC RBC AUTO-ENTMCNC: 33.7 %
MCV RBC AUTO: 113 FL
MONOCYTES # BLD AUTO: ABNORMAL K/UL
MONOCYTES NFR BLD: 15 %
NEUTROPHILS NFR BLD: 8 %
PLATELET # BLD AUTO: 100 K/UL
PLATELET BLD QL SMEAR: ABNORMAL
PMV BLD AUTO: 9.5 FL
POIKILOCYTOSIS BLD QL SMEAR: SLIGHT
RBC # BLD AUTO: 2.28 M/UL
WBC # BLD AUTO: 1.88 K/UL

## 2017-02-07 PROCEDURE — 36415 COLL VENOUS BLD VENIPUNCTURE: CPT

## 2017-02-07 PROCEDURE — 99999 PR PBB SHADOW E&M-EST. PATIENT-LVL III: CPT | Mod: PBBFAC,,, | Performed by: INTERNAL MEDICINE

## 2017-02-07 PROCEDURE — 85027 COMPLETE CBC AUTOMATED: CPT

## 2017-02-07 PROCEDURE — 99215 OFFICE O/P EST HI 40 MIN: CPT | Mod: S$GLB,,, | Performed by: INTERNAL MEDICINE

## 2017-02-07 NOTE — MR AVS SNAPSHOT
[facility]ematology [facility]  [address] [address]  Phone: [phone]  Fax: [phone]                  Carlos Jordan Jr.   2017 1:00 PM   Office Visit    Description:  Male : 1955   Provider:  Leland Chinchilla Jr., MD   Department:  [facility] - Hematology Oncology           Diagnoses this Visit        Comments    Acute myeloid leukemia not having achieved remission    -  Primary     Thrombocytopenia         Acute myeloid leukemia in relapse         Pancytopenia                To Do List           Future Appointments        Provider Department Dept Phone    2017 1:00 PM Leland Chinchilla Jr., MD [facility]ematology Oncology [phone]    2017 1:30 PM LABORATORY, [address] [facility] [phone]    2017 9:45 AM LAB, SAME DAY [address] [facility] [phone]    2017 10:00 AM Lavell Flor MD [facility]ology [facility] [phone]    2017 10:30 AM CHAIR [address] [facility] [phone]      Goals (5 Years of Data)     None      [facility]     [facility] Nurse Care Line -  Assistance  Registered nurses in the [facility] provide clinical advisement, health education, appointment booking, and other advisory services.  Call for this free service at [phone].             Medications           Message regarding Medications     Verify the changes and/or additions to your medication regime listed below are the same as discussed with your clinician today.  If any of these changes or additions are incorrect, please notify your healthcare provider.             Verify that the below list of medications is an accurate representation of the medications you are currently taking.  If none reported, the list may be blank. If incorrect, please contact your healthcare provider. Carry this list with you in case of emergency.           Current Medications     ondansetron  "(ZOFRAN) 8 MG tablet Take 1 tablet (8 mg total) by mouth every 12 (twelve) hours as needed for Nausea.    valacyclovir (VALTREX) 500 MG tablet Take 1 tablet (500 mg total) by mouth once daily.           Clinical Reference Information           Your Vitals Were     BP Pulse Temp Resp Height Weight    114/68 89 98.3 °F (36.8 °C) (Oral) 18 5' 8" (1.727 m) 72.7 kg (160 lb 4.4 oz)    SpO2 BMI             99% 24.37 kg/m2         Blood Pressure          Most Recent Value    BP  114/68      Allergies as of 2/7/2017     No Known Allergies      Immunizations Administered on Date of Encounter - 2/7/2017     None      Orders Placed During Today's Visit     Future Labs/Procedures Expected by Expires    CBC auto differential  2/7/2017 4/8/2018    Comprehensive metabolic panel  2/7/2017 4/8/2018    Lactate dehydrogenase  2/7/2017 4/8/2018      [facility] Sign-Up     Activating your [facility] account is as easy as 1-2-3!     1) Visit [email or web address], select Sign Up Now, enter this activation code and your date of birth, then select Next.  [email or web address]  Expires: 3/24/2017 10:10 AM      2) Create a username and password to use when you visit [facility] in the future and select a security question in case you lose your password and select Next.    3) Enter your e-mail address and click Sign Up!    Additional Information  If you have questions, please e-mail [email or web address] or call [phone] to talk to our [facility] staff. Remember, [facility] is NOT to be used for urgent needs. For medical emergencies, dial 911.         Language Assistance Services     ATTENTION: Language assistance services are available, free of charge. Please call [phone].      ATENCIÓN: Si habla español, tiene a cano disposición servicios gratuitos de asistencia lingüística. Llame al [phone].     CHÚ Ý: N?u b?n nói Ti?ng Vi?t, có các d?ch v? h? tr? ngôn ng? mi?n phí dành cho b?n. G?i s? [phone].         [facility] " complies with applicable Federal civil rights laws and does not discriminate on the basis of race, color, national origin, age, disability, or sex.

## 2017-02-07 NOTE — PROGRESS NOTES
Hematology/Oncology Office Note    CC:  AML    Referred by:  No ref. provider found    Diagnosis:  Refractory AML    Treatment:  Refractory AML status post 7+3    History of present illness:  61-year-old gentleman with refractory AML status post 7+3 in 11/2016.  He is currently on palliative Vidaza and is day 22 of cycle 2.   He has no complaints today and specifically denies fever, chills, night sweats or weight loss.  He is followed by Dr. Flor in the clinic and I'm seeing the patient for the first time today in Dr. Flor's absence.    Past Medical History   Diagnosis Date    AML (acute myeloblastic leukemia) 11/04/2016     non -M3     Hypertension     Stroke      R side weakness         Social History:  No tobacco, alcohol, or illicit drugs    Family History: family history includes Diabetes in his father.    HPI  Review of Systems   Constitutional: Negative for activity change, appetite change, fatigue, fever and unexpected weight change.   HENT: Negative for congestion, facial swelling, nosebleeds, rhinorrhea, sinus pressure, sneezing, sore throat, trouble swallowing and voice change.    Eyes: Negative for photophobia, pain, discharge, itching and visual disturbance.   Respiratory: Negative for apnea, cough, choking, chest tightness and shortness of breath.    Cardiovascular: Negative for chest pain, palpitations and leg swelling.   Gastrointestinal: Negative for abdominal distention, abdominal pain, anal bleeding, blood in stool, constipation, diarrhea, nausea and vomiting.   Endocrine: Negative for cold intolerance, heat intolerance, polydipsia, polyphagia and polyuria.   Genitourinary: Negative for decreased urine volume, difficulty urinating, frequency, genital sores, hematuria, testicular pain and urgency.   Musculoskeletal: Negative for arthralgias, back pain, gait problem, joint swelling, myalgias, neck pain and neck stiffness.   Skin: Negative for color change, pallor, rash and wound.  "  Allergic/Immunologic: Negative for environmental allergies, food allergies and immunocompromised state.   Neurological: Negative for dizziness, tremors, syncope, speech difficulty, weakness, light-headedness, numbness and headaches.   Hematological: Negative for adenopathy. Does not bruise/bleed easily.   Psychiatric/Behavioral: Negative for agitation, confusion and hallucinations. The patient is not nervous/anxious and is not hyperactive.        Objective:       Vitals:    02/07/17 0938   BP: 114/68   Pulse: 89   Resp: 18   Temp: 98.3 °F (36.8 °C)   TempSrc: Oral   SpO2: 99%   Weight: 72.7 kg (160 lb 4.4 oz)   Height: 5' 8" (1.727 m)     Physical Exam   Constitutional: He is oriented to person, place, and time. He appears well-developed and well-nourished.  Non-toxic appearance. He does not appear ill. No distress.   HENT:   Head: Normocephalic and atraumatic.   Right Ear: Tympanic membrane and ear canal normal.   Left Ear: Tympanic membrane and ear canal normal.   Nose: Nose normal. Right sinus exhibits no maxillary sinus tenderness and no frontal sinus tenderness. Left sinus exhibits no maxillary sinus tenderness and no frontal sinus tenderness.   Mouth/Throat: Oropharynx is clear and moist and mucous membranes are normal. No oral lesions. Abnormal dentition. Dental caries present. No oropharyngeal exudate, posterior oropharyngeal edema or posterior oropharyngeal erythema.   Eyes: Conjunctivae, EOM and lids are normal. Pupils are equal, round, and reactive to light. Right conjunctiva is not injected. Right conjunctiva has no hemorrhage. Left conjunctiva is not injected. Left conjunctiva has no hemorrhage. No scleral icterus.   Neck: Normal range of motion. Neck supple. No JVD present. No spinous process tenderness and no muscular tenderness present. No tracheal deviation present. No thyromegaly present.   Cardiovascular: Normal rate, regular rhythm, normal heart sounds and intact distal pulses.    No murmur " heard.  Pulmonary/Chest: Effort normal and breath sounds normal. No respiratory distress. He has no decreased breath sounds. He has no wheezes. He has no rhonchi. He has no rales. He exhibits no tenderness.   Abdominal: Soft. Bowel sounds are normal. He exhibits no distension and no mass. There is no hepatosplenomegaly. There is no tenderness. There is no rebound and no guarding.   Musculoskeletal: Normal range of motion. He exhibits no edema or tenderness.   Lymphadenopathy:        Head (right side): No submental and no submandibular adenopathy present.        Head (left side): No submental and no submandibular adenopathy present.     He has no cervical adenopathy.     He has no axillary adenopathy.   Neurological: He is alert and oriented to person, place, and time. No cranial nerve deficit. He exhibits normal muscle tone. Coordination normal.   Skin: Skin is warm and dry. No rash noted. He is not diaphoretic. No cyanosis or erythema. No pallor. Nails show no clubbing.   Psychiatric: He has a normal mood and affect. His speech is normal and behavior is normal. Judgment and thought content normal.       Lab Results   Component Value Date    WBC 1.88 (LL) 02/07/2017    HGB 8.7 (L) 02/07/2017    HCT 25.8 (L) 02/07/2017     (H) 02/07/2017     (L) 02/07/2017     Lab Results   Component Value Date    CREATININE 0.8 01/16/2017    BUN 10 01/16/2017     01/16/2017    K 4.1 01/16/2017     01/16/2017    CO2 24 01/16/2017     Lab Results   Component Value Date    ALT 10 01/16/2017    AST 9 (L) 01/16/2017    ALKPHOS 55 01/16/2017    BILITOT 0.5 01/16/2017         Assessment:     61-year-old gentleman with refractory AML currently on palliative Vidaza.  He is followed by Dr. Flor in the hematology/oncology clinic and I am seeing the patient for the first time today.  He is cycle 2 day 22 and will begin cycle 3 on 2/13/2017.  Counts are stable today and he does not require transfusion.  Continue  treatment as scheduled      AML:  --No need for transfusion today  --Follow-up on 2/13/2017 for cycle 3

## 2017-02-13 ENCOUNTER — OFFICE VISIT (OUTPATIENT)
Dept: HEMATOLOGY/ONCOLOGY | Facility: CLINIC | Age: 62
End: 2017-02-13
Payer: MEDICARE

## 2017-02-13 ENCOUNTER — TELEPHONE (OUTPATIENT)
Dept: HEMATOLOGY/ONCOLOGY | Facility: CLINIC | Age: 62
End: 2017-02-13

## 2017-02-13 ENCOUNTER — INFUSION (OUTPATIENT)
Dept: INFUSION THERAPY | Facility: HOSPITAL | Age: 62
End: 2017-02-13
Attending: INTERNAL MEDICINE
Payer: MEDICARE

## 2017-02-13 VITALS
HEART RATE: 90 BPM | SYSTOLIC BLOOD PRESSURE: 116 MMHG | WEIGHT: 161.19 LBS | RESPIRATION RATE: 18 BRPM | OXYGEN SATURATION: 96 % | BODY MASS INDEX: 24.43 KG/M2 | TEMPERATURE: 97 F | DIASTOLIC BLOOD PRESSURE: 60 MMHG | HEIGHT: 68 IN

## 2017-02-13 VITALS — BODY MASS INDEX: 24.43 KG/M2 | HEIGHT: 68 IN | WEIGHT: 161.19 LBS

## 2017-02-13 DIAGNOSIS — C92.00 ACUTE MYELOID LEUKEMIA NOT HAVING ACHIEVED REMISSION: Primary | ICD-10-CM

## 2017-02-13 DIAGNOSIS — C92.00 ACUTE MYELOID LEUKEMIA NOT HAVING ACHIEVED REMISSION: ICD-10-CM

## 2017-02-13 DIAGNOSIS — C92.02 ACUTE MYELOID LEUKEMIA IN RELAPSE: Primary | ICD-10-CM

## 2017-02-13 DIAGNOSIS — C92.02 ACUTE MYELOID LEUKEMIA IN RELAPSE: ICD-10-CM

## 2017-02-13 PROCEDURE — 25000003 PHARM REV CODE 250: Performed by: INTERNAL MEDICINE

## 2017-02-13 PROCEDURE — 96401 CHEMO ANTI-NEOPL SQ/IM: CPT

## 2017-02-13 PROCEDURE — 63600175 PHARM REV CODE 636 W HCPCS: Performed by: INTERNAL MEDICINE

## 2017-02-13 PROCEDURE — 99999 PR PBB SHADOW E&M-EST. PATIENT-LVL III: CPT | Mod: PBBFAC,,, | Performed by: INTERNAL MEDICINE

## 2017-02-13 PROCEDURE — 99215 OFFICE O/P EST HI 40 MIN: CPT | Mod: 25,S$GLB,, | Performed by: INTERNAL MEDICINE

## 2017-02-13 RX ORDER — ONDANSETRON 4 MG/1
8 TABLET, FILM COATED ORAL ONCE
Status: CANCELLED
Start: 2017-02-17 | End: 2017-02-17

## 2017-02-13 RX ORDER — AZACITIDINE 100 MG/1
75 INJECTION, POWDER, LYOPHILIZED, FOR SOLUTION INTRAVENOUS; SUBCUTANEOUS
Status: CANCELLED | OUTPATIENT
Start: 2017-02-17

## 2017-02-13 RX ORDER — ONDANSETRON 4 MG/1
8 TABLET, FILM COATED ORAL ONCE
Status: CANCELLED
Start: 2017-02-15 | End: 2017-02-15

## 2017-02-13 RX ORDER — AZACITIDINE 100 MG/1
75 INJECTION, POWDER, LYOPHILIZED, FOR SOLUTION INTRAVENOUS; SUBCUTANEOUS
Status: COMPLETED | OUTPATIENT
Start: 2017-02-13 | End: 2017-02-13

## 2017-02-13 RX ORDER — ONDANSETRON 4 MG/1
8 TABLET, FILM COATED ORAL ONCE
Status: CANCELLED
Start: 2017-02-14 | End: 2017-02-14

## 2017-02-13 RX ORDER — AZACITIDINE 100 MG/1
75 INJECTION, POWDER, LYOPHILIZED, FOR SOLUTION INTRAVENOUS; SUBCUTANEOUS
Status: CANCELLED | OUTPATIENT
Start: 2017-02-14

## 2017-02-13 RX ORDER — AZACITIDINE 100 MG/1
75 INJECTION, POWDER, LYOPHILIZED, FOR SOLUTION INTRAVENOUS; SUBCUTANEOUS
Status: CANCELLED | OUTPATIENT
Start: 2017-02-15

## 2017-02-13 RX ORDER — ONDANSETRON 4 MG/1
8 TABLET, FILM COATED ORAL ONCE
Status: CANCELLED
Start: 2017-02-13 | End: 2017-02-13

## 2017-02-13 RX ORDER — ONDANSETRON 4 MG/1
8 TABLET, FILM COATED ORAL ONCE
Status: CANCELLED
Start: 2017-02-16 | End: 2017-02-16

## 2017-02-13 RX ORDER — AZACITIDINE 100 MG/1
75 INJECTION, POWDER, LYOPHILIZED, FOR SOLUTION INTRAVENOUS; SUBCUTANEOUS
Status: CANCELLED | OUTPATIENT
Start: 2017-02-16

## 2017-02-13 RX ORDER — ONDANSETRON HYDROCHLORIDE 8 MG/1
8 TABLET, FILM COATED ORAL ONCE
Status: COMPLETED | OUTPATIENT
Start: 2017-02-13 | End: 2017-02-13

## 2017-02-13 RX ORDER — AZACITIDINE 100 MG/1
75 INJECTION, POWDER, LYOPHILIZED, FOR SOLUTION INTRAVENOUS; SUBCUTANEOUS
Status: CANCELLED | OUTPATIENT
Start: 2017-02-13

## 2017-02-13 RX ADMIN — AZACITIDINE 140 MG: 100 INJECTION, POWDER, LYOPHILIZED, FOR SOLUTION INTRAVENOUS; SUBCUTANEOUS at 11:02

## 2017-02-13 RX ADMIN — ONDANSETRON 8 MG: 8 TABLET, FILM COATED ORAL at 10:02

## 2017-02-13 NOTE — PROGRESS NOTES
Subjective:       Patient ID: Carlos Jordan Jr. is a 62 y.o. male.    Chief Complaint: No chief complaint on file.    HPI This is a 61-year-old gentleman who comes for follow up of his AML.  He was found to have pancytopenia during a routine cbc done at the ER because of other complains.  A bone marrow was done on 11/4/2016. 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  He said he was not interested in discussing a stem cell transplant .  He said he would prefer to be treated with a low toxicity agent like Vidaza which would allow him to be treated as an outpatient.  He comes on C3  d1 of VIDAZA  He has no complaints other than insomnia      ALLERGIES: None.      MEDICATIONS: None.      PREVIOUS SURGERIES: Amputation of the tip of the right middle finger.      SOCIAL HISTORY: , has one child. He lives in [place]. He smokes half a  pack a day and has been doing this for 35 years. Denies significant drinking.   He is retired.      FAMILY HISTORY: Brother had brain tumor. Father had diabetes. No heart   attacks.      PAST MEDICAL HISTORY:  1. AML  2. Tobacco use.    3-thrombocytopenia  Review of Systems   Constitutional: Negative.  Negative for fatigue.   Eyes: Negative.    Cardiovascular: Negative.  Negative for chest pain.   Gastrointestinal: Negative for abdominal pain and nausea.   Genitourinary: Negative.  Negative for hematuria.   Musculoskeletal: Negative.    Skin: Negative.    Neurological: Negative.    Psychiatric/Behavioral: Negative.        Objective:      Physical Exam   Constitutional: He is oriented to person, place, and time. He appears well-developed and well-nourished.   HENT:   Head: Normocephalic.   Mouth/Throat: No oropharyngeal exudate.   Eyes: Pupils are equal, round, and  reactive to light.   Neck: No thyromegaly present.   Cardiovascular: Normal rate, regular rhythm and normal heart sounds.  Exam reveals no gallop.    No murmur heard.  Pulmonary/Chest: No respiratory distress. He has no wheezes. He has no rales.   Abdominal: Soft. Bowel sounds are normal. He exhibits no distension and no mass. There is no rebound and no guarding.   Musculoskeletal: Normal range of motion. He exhibits no edema.   Lymphadenopathy:     He has no cervical adenopathy.   Neurological: He is alert and oriented to person, place, and time.   Skin: Skin is warm and dry.   Psychiatric: He has a normal mood and affect. His behavior is normal.       Wt Readings from Last 3 Encounters:   02/13/17 73.1 kg (161 lb 2.5 oz)   02/07/17 72.7 kg (160 lb 4.4 oz)   01/31/17 72.1 kg (158 lb 15.2 oz)     Temp Readings from Last 3 Encounters:   02/13/17 96.9 °F (36.1 °C) (Oral)   02/07/17 98.3 °F (36.8 °C) (Oral)   01/31/17 97.5 °F (36.4 °C) (Oral)     BP Readings from Last 3 Encounters:   02/13/17 116/60   02/07/17 114/68   01/31/17 128/80     Pulse Readings from Last 3 Encounters:   02/13/17 90   02/07/17 89   01/31/17 82       Assessment:       1. Acute myeloid leukemia in relapse        Plan:       Blood tests reviewed  Lab Results   Component Value Date    WBC 1.52 (LL) 02/13/2017    HGB 9.6 (L) 02/13/2017    HCT 28.6 (L) 02/13/2017     (H) 02/13/2017     (L) 02/13/2017       There has galdino sort of  a mixed type of response with improvement of the platelet count but  Wbc remaining low.  We will start c3 of Vidaza.  Benadryl OTC for insmonia     See me in 7 days with a cbc

## 2017-02-13 NOTE — PLAN OF CARE
Problem: Patient Care Overview  Goal: Plan of Care Review  Outcome: Ongoing (interventions implemented as appropriate)  Pt states that he has been feeling good but woke up this morning tired.

## 2017-02-13 NOTE — PATIENT INSTRUCTIONS
[facility]  [address]  [address] [address]  [phone] phone     [phone] fax  Hours of Operation: Monday- Friday 8:00am- 5:00pm  After hours phone  [phone]  Hematology / Oncology Physicians on call      Dr. Tristan Gardiner    Please call with any concerns regarding your appointment today.    FALL PREVENTION   Falls often occur due to slipping, tripping or losing your balance. Here are ways to reduce your risk of falling again.   Was there anything that caused your fall that can be fixed, removed or replaced?   Make your home safe by keeping walkways clear of objects you may trip over.   Use non-slip pads under rugs.   Do not walk in poorly lit areas.   Do not stand on chairs or wobbly ladders.   Use caution when reaching overhead or looking upward. This position can cause a loss of balance.   Be sure your shoes fit properly, have non-slip bottoms and are in good condition.   Be cautious when going up and down stairs, curbs, and when walking on uneven sidewalks.   If your balance is poor, consider using a cane or walker.   If your fall was related to alcohol use, stop or limit alcohol intake.   If your fall was related to use of sleeping medicines, talk to your doctor about this. You may need to reduce your dosage at bedtime if you awaken during the night to go to the bathroom.   To reduce the need for nighttime bathroom trips:   Avoid drinking fluids for several hours before going to bed   Empty your bladder before going to bed   Men can keep a urinal at the bedside   © 0412-0101 [employer], [address] [place] [address]. All rights reserved. This information is not intended as a substitute for professional medical care. Always follow your healthcare professional's instructions.    HOME CARE AFTER CHEMOTHERAPY   Meals   Many patients feel sick and lose their appetites during treatment. Eat small meals  several times a day. Choose bland foods with little taste or smell if you have problems with nausea. Be sure to cook all food thoroughly. This kills bacteria and helps you avoid intestinal infection. Soft foods are easier to swallow and digest.   Activity   Exercise keeps you strong and keeps your heart and lungs active. Talk to your doctor about an appropriate exercise program for you.   Skin Care   To prevent a skin infection, bathe or shower once a day. Use a moisturizing soap and wash with warm water. Avoid very hot or cold water. Chemotherapy can make your skin dry . Apply moisturizing lotion to help relieve dry skin. Some drugs used in high doses can cause slight burns to appear (like sunburn). Ask for a special cream to help relieve the burn and protect your skin.   Prevent Mouth Sores   During chemotherapy, many people get mouth sores. Do the following to help prevent mouth sores or to ease discomfort.   Brush your teeth with a soft-bristle toothbrush after every meal.  Don't use dental floss if your platelet count is below 50,000. Your doctor or nurse will tell you if this is the case.  Use an oral swab or special soft toothbrush if your gums bleed during regular brushing.  Use mouthwash as directed. If you can't tolerate commercial mouthwash, use salt and baking soda to clean your mouth. Mix 1 teaspoon of salt and 1 teaspoon of baking soda into a glass of water. Swish and spit.  Call your doctor or return to this facility if you develop any of the following:   Sore throat   White patches in the mouth or throat   Fever of 100.4ºF (38ºC) or higher, or as directed by your healthcare provider  © 6027-1917 [employer], [address] [place] [address], [place] [address]. All rights reserved. This information is not intended as a substitute for professional medical care. Always follow your healthcare professional's     YOU HAVE STARTED ON CHEMOTHERAPY. IF YOU EXPERIENCE ANY OF THE FOLLOWING PROBLEMS, CALL THE OFFICE  IMMEDIATELY.    *FEVER .0 OR GREATER    *CHILLS, ESPECIALLY SHAKING CHILLS, OR SWEATING    *A SEVERE COUGH OR SORE THROAT, OR SINUS PAIN/     PRESSURE    *REDNESS, SWELLING, OR TENDERNESS AROUND A WOUND,     SORE, PIMPLE, RECTAL AREA, OR IV SITE    *SORES OR ULCERS IN THE MOUTH    *BLISTERS ON THE LIPS OR SKIN    *FREQUENT URGENCY TO URINATE OR A BURNING FEELING   WHEN YOU URINATE    *BLOOD IN THE URINE OR STOOL    *ANY UNEXPLAINED BRUISING OR PROLONGED BLEEDING,     (NOSEBLEEDS OR BLEEDING GUMS)    *LOOSE BOWEL MOVEMENTS THAT DO NOT RESPOND TO     IMODIUM OR MORE THAN THREE TIMES A DAY    *VOMITING UNRESPONSIVE TO ANTINAUSEA MEDICINE    *ANY UNUSUAL PHYSICAL SYMPTOMS THAT BEGAN AFTER     CHEMOTHERAPY    DURING WEEKDAYS, CALL AND ASK TO SPEAK DIRECTLY TO A NURSE.  AT OTHER TIMES, CALL THE OFFICE PHONE NUMBER; THE ANSWERING SERVICE WILL CONTACT THE ON-CALL PHYSICIAN.  SOMEONE IS AVAILABLE 24 HOURS A DAY, SEVEN DAYS A WEEK.

## 2017-02-13 NOTE — MR AVS SNAPSHOT
Patient Information     Patient Name Sex Carlos Morales Jr. Male 1955      Visit Information        Provider Department Dept Phone Center    2017 10:30 AM [facility] On Chemotherapy Infusion [phone] [facility]      Patient Instructions      [facility]  [address]  [address]  [phone] phone     [phone] fax  Hours of Operation: Monday- Friday 8:00am- 5:00pm  After hours phone  [phone]  Hematology / Oncology Physicians on call      Dr. Tristan Gardiner    Please call with any concerns regarding your appointment today.    FALL PREVENTION   Falls often occur due to slipping, tripping or losing your balance. Here are ways to reduce your risk of falling again.   Was there anything that caused your fall that can be fixed, removed or replaced?   Make your home safe by keeping walkways clear of objects you may trip over.   Use non-slip pads under rugs.   Do not walk in poorly lit areas.   Do not stand on chairs or wobbly ladders.   Use caution when reaching overhead or looking upward. This position can cause a loss of balance.   Be sure your shoes fit properly, have non-slip bottoms and are in good condition.   Be cautious when going up and down stairs, curbs, and when walking on uneven sidewalks.   If your balance is poor, consider using a cane or walker.   If your fall was related to alcohol use, stop or limit alcohol intake.   If your fall was related to use of sleeping medicines, talk to your doctor about this. You may need to reduce your dosage at bedtime if you awaken during the night to go to the bathroom.   To reduce the need for nighttime bathroom trips:   Avoid drinking fluids for several hours before going to bed   Empty your bladder before going to bed   Men can keep a urinal at the bedside   © 8962-6347 Elder [employer]eno, [address] [place] [address], [place] [address]. All rights  reserved. This information is not intended as a substitute for professional medical care. Always follow your healthcare professional's instructions.    HOME CARE AFTER CHEMOTHERAPY   Meals   Many patients feel sick and lose their appetites during treatment. Eat small meals several times a day. Choose bland foods with little taste or smell if you have problems with nausea. Be sure to cook all food thoroughly. This kills bacteria and helps you avoid intestinal infection. Soft foods are easier to swallow and digest.   Activity   Exercise keeps you strong and keeps your heart and lungs active. Talk to your doctor about an appropriate exercise program for you.   Skin Care   To prevent a skin infection, bathe or shower once a day. Use a moisturizing soap and wash with warm water. Avoid very hot or cold water. Chemotherapy can make your skin dry . Apply moisturizing lotion to help relieve dry skin. Some drugs used in high doses can cause slight burns to appear (like sunburn). Ask for a special cream to help relieve the burn and protect your skin.   Prevent Mouth Sores   During chemotherapy, many people get mouth sores. Do the following to help prevent mouth sores or to ease discomfort.   Brush your teeth with a soft-bristle toothbrush after every meal.  Don't use dental floss if your platelet count is below 50,000. Your doctor or nurse will tell you if this is the case.  Use an oral swab or special soft toothbrush if your gums bleed during regular brushing.  Use mouthwash as directed. If you can't tolerate commercial mouthwash, use salt and baking soda to clean your mouth. Mix 1 teaspoon of salt and 1 teaspoon of baking soda into a glass of water. Swish and spit.  Call your doctor or return to this facility if you develop any of the following:   Sore throat   White patches in the mouth or throat   Fever of 100.4ºF (38ºC) or higher, or as directed by your healthcare provider  © 6642-5406 [employer]re [employer], [address] [place] [address]  Road, [place] [address]. All rights reserved. This information is not intended as a substitute for professional medical care. Always follow your healthcare professional's     YOU HAVE STARTED ON CHEMOTHERAPY. IF YOU EXPERIENCE ANY OF THE FOLLOWING PROBLEMS, CALL THE OFFICE IMMEDIATELY.    *FEVER .0 OR GREATER    *CHILLS, ESPECIALLY SHAKING CHILLS, OR SWEATING    *A SEVERE COUGH OR SORE THROAT, OR SINUS PAIN/     PRESSURE    *REDNESS, SWELLING, OR TENDERNESS AROUND A WOUND,     SORE, PIMPLE, RECTAL AREA, OR IV SITE    *SORES OR ULCERS IN THE MOUTH    *BLISTERS ON THE LIPS OR SKIN    *FREQUENT URGENCY TO URINATE OR A BURNING FEELING   WHEN YOU URINATE    *BLOOD IN THE URINE OR STOOL    *ANY UNEXPLAINED BRUISING OR PROLONGED BLEEDING,     (NOSEBLEEDS OR BLEEDING GUMS)    *LOOSE BOWEL MOVEMENTS THAT DO NOT RESPOND TO     IMODIUM OR MORE THAN THREE TIMES A DAY    *VOMITING UNRESPONSIVE TO ANTINAUSEA MEDICINE    *ANY UNUSUAL PHYSICAL SYMPTOMS THAT BEGAN AFTER     CHEMOTHERAPY    DURING WEEKDAYS, CALL AND ASK TO SPEAK DIRECTLY TO A NURSE.  AT OTHER TIMES, CALL THE OFFICE PHONE NUMBER; THE ANSWERING SERVICE WILL CONTACT THE ON-CALL PHYSICIAN.  SOMEONE IS AVAILABLE 24 HOURS A DAY, SEVEN DAYS A WEEK.       Your Current Medications Are     ondansetron (ZOFRAN) 8 MG tablet    valacyclovir (VALTREX) 500 MG tablet      Facility-Administered Medications     azacitidine injection 140 mg    ondansetron tablet 8 mg      Appointments for Next Year     2/14/2017 10:00 AM INFUSION 030 MIN (30 min.) [facility] 05 [facility]    Arrive at check-in approximately 15 minutes before your scheduled appointment time. Bring all outside medical records and imaging, along with a list of your current medications and insurance card.    (off [facility]) 1st floor    2/15/2017 10:00 AM INFUSION 030 MIN (30 min.) [facility] CHAIR 01 ONLH    Arrive at check-in approximately 15 minutes before your scheduled  "appointment time. Bring all outside medical records and imaging, along with a list of your current medications and insurance card.    (off [facility]) 1st floor    2/16/2017 10:00 AM INFUSION 030 MIN (30 min.) [facility] CHAIR 01 ONLH    Arrive at check-in approximately 15 minutes before your scheduled appointment time. Bring all outside medical records and imaging, along with a list of your current medications and insurance card.    (off [facility]) 1st floor    2/17/2017 10:00 AM INFUSION 030 MIN (30 min.) [facility] CHAIR 02 ONLH    Arrive at check-in approximately 15 minutes before your scheduled appointment time. Bring all outside medical records and imaging, along with a list of your current medications and insurance card.    (off [facility]) 1st floor    2/20/2017 12:30 PM NON FASTING LAB (5 min.) [facility] LAB[facility]ATORY, [facility]    Arrive at check-in approximately 15 minutes before your scheduled appointment time. Bring all outside medical records and imaging, along with a list of your current medications and insurance card.    (off [address]) 2nd floor    2/20/2017  1:00 PM ESTABLISHED PATIENT (20 min.) [facility] - Hemotology Oncology Lavell Flor MD    Arrive at check-in approximately 15 minutes before your scheduled appointment time. Bring all outside medical records and imaging, along with a list of your current medications and insurance card.    (off [address]) 3rd Floor         Default Flowsheet Data (last 24 hours)      Amb Complex Vitals Rony        02/13/17 1013 02/13/17 0953             Measurements    Weight 73.1 kg (161 lb 2.5 oz) 73.1 kg (161 lb 2.5 oz)       Height 5' 8" (1.727 m) 5' 8" (1.727 m)       BSA (Calculated - sq m) 1.87 sq meters 1.87 sq meters       BMI (Calculated) 24.6 24.6       BP  116/60       Temp  96.9 °F (36.1 °C)       Pulse  90       Resp  18       SpO2  96 %       Pain Assessment    Pain Score Zero Zero             "   Allergies     No Known Allergies      Medications You Received from 02/12/2017 1128 to 02/13/2017 1128        Date/Time Order Dose Route Action     02/13/2017 1125 azacitidine injection 140 mg 140 mg Subcutaneous Given     02/13/2017 1024 ondansetron tablet 8 mg 8 mg Oral Given      Current Discharge Medication List     Cannot display discharge medications since this is not an admission.

## 2017-02-14 ENCOUNTER — INFUSION (OUTPATIENT)
Dept: INFUSION THERAPY | Facility: HOSPITAL | Age: 62
End: 2017-02-14
Attending: INTERNAL MEDICINE
Payer: MEDICARE

## 2017-02-14 VITALS
HEART RATE: 73 BPM | DIASTOLIC BLOOD PRESSURE: 61 MMHG | SYSTOLIC BLOOD PRESSURE: 107 MMHG | RESPIRATION RATE: 18 BRPM | TEMPERATURE: 98 F

## 2017-02-14 DIAGNOSIS — C92.00 ACUTE MYELOID LEUKEMIA NOT HAVING ACHIEVED REMISSION: Primary | ICD-10-CM

## 2017-02-14 DIAGNOSIS — C92.02 ACUTE MYELOID LEUKEMIA IN RELAPSE: ICD-10-CM

## 2017-02-14 PROCEDURE — 96401 CHEMO ANTI-NEOPL SQ/IM: CPT

## 2017-02-14 PROCEDURE — 63600175 PHARM REV CODE 636 W HCPCS: Performed by: INTERNAL MEDICINE

## 2017-02-14 PROCEDURE — 25000003 PHARM REV CODE 250: Performed by: INTERNAL MEDICINE

## 2017-02-14 RX ORDER — ONDANSETRON HYDROCHLORIDE 8 MG/1
8 TABLET, FILM COATED ORAL ONCE
Status: COMPLETED | OUTPATIENT
Start: 2017-02-14 | End: 2017-02-14

## 2017-02-14 RX ORDER — AZACITIDINE 100 MG/1
75 INJECTION, POWDER, LYOPHILIZED, FOR SOLUTION INTRAVENOUS; SUBCUTANEOUS
Status: COMPLETED | OUTPATIENT
Start: 2017-02-14 | End: 2017-02-14

## 2017-02-14 RX ADMIN — ONDANSETRON 8 MG: 8 TABLET, FILM COATED ORAL at 10:02

## 2017-02-14 RX ADMIN — AZACITIDINE 140 MG: 100 INJECTION, POWDER, LYOPHILIZED, FOR SOLUTION INTRAVENOUS; SUBCUTANEOUS at 10:02

## 2017-02-14 NOTE — MR AVS SNAPSHOT
Patient Information     Patient Name Sex Carlos Morales Jr. Male 1955      Visit Information        Provider Department Dept Phone Center    2017 10:00 AM [facility] On Chemotherapy Infusion [phone] [facility]      Patient Instructions      [facility]  [address]  [address] [address]  [phone] phone     [phone] fax  Hours of Operation: Monday- Friday 8:00am- 5:00pm  After hours phone  [phone]  Hematology / Oncology Physicians on call      Dr. Tristan Gardiner    Please call with any concerns regarding your appointment today.FALL PREVENTION   Falls often occur due to slipping, tripping or losing your balance. Here are ways to reduce your risk of falling again.   Was there anything that caused your fall that can be fixed, removed or replaced?   Make your home safe by keeping walkways clear of objects you may trip over.   Use non-slip pads under rugs.   Do not walk in poorly lit areas.   Do not stand on chairs or wobbly ladders.   Use caution when reaching overhead or looking upward. This position can cause a loss of balance.   Be sure your shoes fit properly, have non-slip bottoms and are in good condition.   Be cautious when going up and down stairs, curbs, and when walking on uneven sidewalks.   If your balance is poor, consider using a cane or walker.   If your fall was related to alcohol use, stop or limit alcohol intake.   If your fall was related to use of sleeping medicines, talk to your doctor about this. You may need to reduce your dosage at bedtime if you awaken during the night to go to the bathroom.   To reduce the need for nighttime bathroom trips:   Avoid drinking fluids for several hours before going to bed   Empty your bladder before going to bed   Men can keep a urinal at the bedside   © 0104-5437 Elder Bueno, [address] [place] [address], [place] [address]. All rights  reserved. This information is not intended as a substitute for professional medical care. Always follow your healthcare professional's instructions.         Your Current Medications Are     ondansetron (ZOFRAN) 8 MG tablet    valacyclovir (VALTREX) 500 MG tablet      Facility-Administered Medications     azacitidine injection 140 mg    azacitidine injection 140 mg    ondansetron tablet 8 mg      Appointments for Next Year     2/15/2017 10:00 AM INFUSION 030 MIN (30 min.) [facility] 01 [facility]    Arrive at check-in approximately 15 minutes before your scheduled appointment time. Bring all outside medical records and imaging, along with a list of your current medications and insurance card.    (off [facility]) [facility] floor    2/16/2017 10:00 AM INFUSION 030 MIN (30 min.) [facility] 01 ON[facility]    Arrive at check-in approximately 15 minutes before your scheduled appointment time. Bring all outside medical records and imaging, along with a list of your current medications and insurance card.    (off [facility]) [facility] floor    2/17/2017 10:00 AM INFUSION 030 MIN (30 min.) [facility] CHAIR 02 ON[facility]    Arrive at check-in approximately 15 minutes before your scheduled appointment time. Bring all outside medical records and imaging, along with a list of your current medications and insurance card.    (off [facility]) [facility] floor    2/20/2017 12:30 PM NON FASTING LAB (5 min.) [facility]    Arrive at check-in approximately 15 minutes before your scheduled appointment time. Bring all outside medical records and imaging, along with a list of your current medications and insurance card.    (off [facility]) 2nd floor    2/20/2017  1:00 PM ESTABLISHED PATIENT (20 min.) [facility] - Hemotology Oncology Lavell Flor MD    Arrive at check-in approximately 15 minutes before your scheduled appointment time. Bring all outside medical records and imaging,  along with a list of your current medications and insurance card.    (off [address]) 3rd Floor         Default Flowsheet Data (last 24 hours)      Amb Complex Vitals Rony        02/14/17 0954                Measurements    /61        Temp 98 °F (36.7 °C)        Pulse 73        Resp 18        Pain Assessment    Pain Score Zero                Allergies     No Known Allergies      Medications You Received from 02/13/2017 1037 to 02/14/2017 1037        Date/Time Order Dose Route Action     02/14/2017 1031 azacitidine injection 140 mg 140 mg Subcutaneous Given     02/14/2017 1003 ondansetron tablet 8 mg 8 mg Oral Given      Current Discharge Medication List     Cannot display discharge medications since this is not an admission.

## 2017-02-14 NOTE — PLAN OF CARE
Problem: Patient Care Overview  Goal: Plan of Care Review  Outcome: Ongoing (interventions implemented as appropriate)  Everything is the same , no problems

## 2017-02-14 NOTE — PATIENT INSTRUCTIONS
[facility]  [address]  [address]  [phone] phone     [phone] fax  Hours of Operation: Monday- Friday 8:00am- 5:00pm  After hours phone  [phone]  Hematology / Oncology Physicians on call      Dr. Tristan Gardiner    Please call with any concerns regarding your appointment today.FALL PREVENTION   Falls often occur due to slipping, tripping or losing your balance. Here are ways to reduce your risk of falling again.   Was there anything that caused your fall that can be fixed, removed or replaced?   Make your home safe by keeping walkways clear of objects you may trip over.   Use non-slip pads under rugs.   Do not walk in poorly lit areas.   Do not stand on chairs or wobbly ladders.   Use caution when reaching overhead or looking upward. This position can cause a loss of balance.   Be sure your shoes fit properly, have non-slip bottoms and are in good condition.   Be cautious when going up and down stairs, curbs, and when walking on uneven sidewalks.   If your balance is poor, consider using a cane or walker.   If your fall was related to alcohol use, stop or limit alcohol intake.   If your fall was related to use of sleeping medicines, talk to your doctor about this. You may need to reduce your dosage at bedtime if you awaken during the night to go to the bathroom.   To reduce the need for nighttime bathroom trips:   Avoid drinking fluids for several hours before going to bed   Empty your bladder before going to bed   Men can keep a urinal at the bedside   © 6548-5202 Elder Bueno, [address] [place] [address], [place] [address]. All rights reserved. This information is not intended as a substitute for professional medical care. Always follow your healthcare professional's instructions.

## 2017-02-15 ENCOUNTER — INFUSION (OUTPATIENT)
Dept: INFUSION THERAPY | Facility: HOSPITAL | Age: 62
End: 2017-02-15
Attending: INTERNAL MEDICINE
Payer: MEDICARE

## 2017-02-15 ENCOUNTER — OFFICE VISIT (OUTPATIENT)
Dept: HEMATOLOGY/ONCOLOGY | Facility: CLINIC | Age: 62
End: 2017-02-15
Payer: MEDICARE

## 2017-02-15 VITALS
HEART RATE: 82 BPM | DIASTOLIC BLOOD PRESSURE: 79 MMHG | SYSTOLIC BLOOD PRESSURE: 151 MMHG | TEMPERATURE: 98 F | RESPIRATION RATE: 20 BRPM

## 2017-02-15 DIAGNOSIS — M79.671 RIGHT FOOT PAIN: ICD-10-CM

## 2017-02-15 DIAGNOSIS — C92.00 ACUTE MYELOID LEUKEMIA NOT HAVING ACHIEVED REMISSION: Primary | ICD-10-CM

## 2017-02-15 DIAGNOSIS — C92.02 ACUTE MYELOID LEUKEMIA IN RELAPSE: ICD-10-CM

## 2017-02-15 PROCEDURE — 63600175 PHARM REV CODE 636 W HCPCS: Mod: JW | Performed by: INTERNAL MEDICINE

## 2017-02-15 PROCEDURE — 96401 CHEMO ANTI-NEOPL SQ/IM: CPT

## 2017-02-15 PROCEDURE — 99213 OFFICE O/P EST LOW 20 MIN: CPT | Mod: S$GLB,,, | Performed by: INTERNAL MEDICINE

## 2017-02-15 PROCEDURE — 25000003 PHARM REV CODE 250: Performed by: INTERNAL MEDICINE

## 2017-02-15 RX ORDER — ONDANSETRON HYDROCHLORIDE 8 MG/1
8 TABLET, FILM COATED ORAL ONCE
Status: COMPLETED | OUTPATIENT
Start: 2017-02-15 | End: 2017-02-15

## 2017-02-15 RX ORDER — AZACITIDINE 100 MG/1
75 INJECTION, POWDER, LYOPHILIZED, FOR SOLUTION INTRAVENOUS; SUBCUTANEOUS
Status: COMPLETED | OUTPATIENT
Start: 2017-02-15 | End: 2017-02-15

## 2017-02-15 RX ADMIN — ONDANSETRON 8 MG: 8 TABLET, FILM COATED ORAL at 09:02

## 2017-02-15 RX ADMIN — AZACITIDINE 140 MG: 100 INJECTION, POWDER, LYOPHILIZED, FOR SOLUTION INTRAVENOUS; SUBCUTANEOUS at 10:02

## 2017-02-15 NOTE — MR AVS SNAPSHOT
Patient Information     Patient Name Sex Carlos Morales Jr. Male 1955      Visit Information        Provider Department Dept Phone Center    2/15/2017 10:00 AM [facility] Chemo Infusion On Chemotherapy Infusion [phone] [facility]      Patient Instructions      [facility]  [address]  [address]  [phone] phone     [phone] fax  Hours of Operation: Monday- Friday 8:00am- 5:00pm  After hours phone  [phone]  Hematology / Oncology Physicians on call      Dr. Tristan Gardiner    Please call with any concerns regarding your appointment today.FALL PREVENTION   Falls often occur due to slipping, tripping or losing your balance. Here are ways to reduce your risk of falling again.   Was there anything that caused your fall that can be fixed, removed or replaced?   Make your home safe by keeping walkways clear of objects you may trip over.   Use non-slip pads under rugs.   Do not walk in poorly lit areas.   Do not stand on chairs or wobbly ladders.   Use caution when reaching overhead or looking upward. This position can cause a loss of balance.   Be sure your shoes fit properly, have non-slip bottoms and are in good condition.   Be cautious when going up and down stairs, curbs, and when walking on uneven sidewalks.   If your balance is poor, consider using a cane or walker.   If your fall was related to alcohol use, stop or limit alcohol intake.   If your fall was related to use of sleeping medicines, talk to your doctor about this. You may need to reduce your dosage at bedtime if you awaken during the night to go to the bathroom.   To reduce the need for nighttime bathroom trips:   Avoid drinking fluids for several hours before going to bed   Empty your bladder before going to bed   Men can keep a urinal at the bedside   © 6159-6196 Elder Bueno, [address] [place] [address], [place] [address]. All rights  reserved. This information is not intended as a substitute for professional medical care. Always follow your healthcare professional's instructions.  HOME CARE AFTER CHEMOTHERAPY   Meals   Many patients feel sick and lose their appetites during treatment. Eat small meals several times a day. Choose bland foods with little taste or smell if you have problems with nausea. Be sure to cook all food thoroughly. This kills bacteria and helps you avoid intestinal infection. Soft foods are easier to swallow and digest.   Activity   Exercise keeps you strong and keeps your heart and lungs active. Talk to your doctor about an appropriate exercise program for you.   Skin Care   To prevent a skin infection, bathe or shower once a day. Use a moisturizing soap and wash with warm water. Avoid very hot or cold water. Chemotherapy can make your skin dry . Apply moisturizing lotion to help relieve dry skin. Some drugs used in high doses can cause slight burns to appear (like sunburn). Ask for a special cream to help relieve the burn and protect your skin.   Prevent Mouth Sores   During chemotherapy, many people get mouth sores. Do the following to help prevent mouth sores or to ease discomfort.   Brush your teeth with a soft-bristle toothbrush after every meal.  Don't use dental floss if your platelet count is below 50,000. Your doctor or nurse will tell you if this is the case.  Use an oral swab or special soft toothbrush if your gums bleed during regular brushing.  Use mouthwash as directed. If you can't tolerate commercial mouthwash, use salt and baking soda to clean your mouth. Mix 1 teaspoon of salt and 1 teaspoon of baking soda into a glass of water. Swish and spit.  Call your doctor or return to this facility if you develop any of the following:   Sore throat   White patches in the mouth or throat   Fever of 100.4ºF (38ºC) or higher, or as directed by your healthcare provider  © 8988-5274 Elder Bueno, [address] [place] [address]  [place] [address]. All rights reserved. This information is not intended as a substitute for professional medical care. Always follow your healthcare professional's   WAYS TO HELP PREVENT INFECTION         WASH YOUR HANDS OFTEN DURING THE DAY, ESPECIALLY BEFORE YOU EAT, AFTER USING THE BATHROOM, AND AFTER TOUCHING ANIMALS     STAY AWAY FROM PEOPLE WHO HAVE ILLNESSES YOU CAN CATCH; SUCH AS COLDS, FLU, CHICKEN POX     TRY TO AVOID CROWDS     STAY AWAY FROM CHILDREN WHO RECENTLY HAVE RECEIVED LIVE VIRUS VACCINES     MAINTAIN GOOD MOUTH CARE     DO NOT SQUEEZE OR SCRATCH PIMPLES     CLEAN CUTS & SCRAPES RIGHT AWAY AND DAILY UNTIL HEALED WITH WARM WATER, SOAP & AN ANTISEPTIC     AVOID CONTACT WITH LITTER BOXES, BIRD CAGES, & FISH TANKS     AVOID STANDING WATER, IE., BIRD BATHS, FLOWER POTS/VASES, OR HUMIDIFIERS     WEAR GLOVES WHEN GARDENING OR CLEANING UP AFTER OTHERS, ESPECIALLY BABIES & SMALL CHILDREN     DO NOT EAT RAW FISH, SEAFOOD, MEAT, OR EGGS       Your Current Medications Are     ondansetron (ZOFRAN) 8 MG tablet    valacyclovir (VALTREX) 500 MG tablet      Facility-Administered Medications     azacitidine injection 140 mg    ondansetron tablet 8 mg      Appointments for Next Year     2/15/2017  3:40 PM ESTABLISHED PATIENT (20 min.) [facility] - Hematology Oncology Lavell Flor MD    Arrive at check-in approximately 15 minutes before your scheduled appointment time. Bring all outside medical records and imaging, along with a list of your current medications and insurance card.    (off [facility]) 1st Floor Appointments with Luly Danielle - 2nd Floor    2/16/2017 10:00 AM INFUSION 030 MIN (30 min.) [facility] 01 [facility]    Arrive at check-in approximately 15 minutes before your scheduled appointment time. Bring all outside medical records and imaging, along with a list of your current medications and insurance card.    (off [facility]) 1st floor    2/17/2017 10:00 AM INFUSION  030 MIN (30 min.) [facility] CHAIR 02 [facility]    Arrive at check-in approximately 15 minutes before your scheduled appointment time. Bring all outside medical records and imaging, along with a list of your current medications and insurance card.    (off [facility]) 1st floor    2/20/2017 12:30 PM NON FASTING LAB (5 min.) [facility] LAB[facility]AT[facility], [facility]    Arrive at check-in approximately 15 minutes before your scheduled appointment time. Bring all outside medical records and imaging, along with a list of your current medications and insurance card.    (off [address]) 2nd floor    2/20/2017  1:00 PM ESTABLISHED PATIENT (20 min.) [facility] - Hemotology Oncology Lavell Flor MD    Arrive at check-in approximately 15 minutes before your scheduled appointment time. Bring all outside medical records and imaging, along with a list of your current medications and insurance card.    (off [address]) 3rd Floor         Default Flowsheet Data (last 24 hours)      Amb Complex Vitals Rony        02/15/17 0948                Measurements    BP (!)  151/79        Temp 97.8 °F (36.6 °C)        Pulse 82        Resp 20        Pain Assessment    Pain Score Seven        Pain Frequency 2 Intermittent        Pain Loc ANKLE   right ankle while standing                Allergies     No Known Allergies      Medications You Received from 02/14/2017 1034 to 02/15/2017 1034        Date/Time Order Dose Route Action     02/15/2017 1029 azacitidine injection 140 mg 140 mg Subcutaneous Given     02/15/2017 0957 ondansetron tablet 8 mg 8 mg Oral Given      Current Discharge Medication List     Cannot display discharge medications since this is not an admission.

## 2017-02-15 NOTE — PROGRESS NOTES
Subjective:       Patient ID: Carlos Jordan Jr. is a 62 y.o. male.    Chief Complaint: No chief complaint on file.    HPI  This is a 62 year-old gentleman who comes for follow up of his AML.  He was found to have pancytopenia during a routine cbc done at the ER because of other complains.  A bone marrow was done on 11/4/2016. 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.  He said he was not interested in discussing a stem cell transplant .  He said he would prefer to be treated with a low toxicity agent like Vidaza which would allow him to be treated as an outpatient.  He came in for  C3  d3 of VIDAZA  He compalined to the nurses of having pain in thr right foot and thr nurse asked me to see himALLERGIES: None.      MEDICATIONS: None.      PREVIOUS SURGERIES: Amputation of the tip of the right middle finger.      SOCIAL HISTORY: , has one child. He lives in [place]. He smokes half a  pack a day and has been doing this for 35 years. Denies significant drinking.   He is retired.      FAMILY HISTORY: Brother had brain tumor. Father had diabetes. No heart   attacks.      PAST MEDICAL HISTORY:  1. AML  2. Tobacco use.    3-thrombocytopenia  Review of Systems   Constitutional: Negative.  Negative for fatigue.   Eyes: Negative.    Cardiovascular: Negative.  Negative for chest pain.   Gastrointestinal: Negative for abdominal pain and nausea.   Genitourinary: Negative.  Negative for hematuria.   Musculoskeletal: Negative.    Skin: Negative.    Neurological: Negative.    Psychiatric/Behavioral: Negative.        Objective:      Physical Exam   Constitutional: He is oriented to person, place, and time. He appears well-developed.   HENT:   Head: Normocephalic.   Eyes: Pupils are equal, round, and reactive to  light.   Neck: Normal range of motion.   Cardiovascular: Normal rate.    Pulmonary/Chest: Effort normal. No respiratory distress.   Musculoskeletal: Normal range of motion.   There is subjective tenderness on the anterior aspect of the right foot, just at the distal aspect of the tibia and above the ankle joint. No swelling or deformity. No history of trauma   Neurological: He is alert and oriented to person, place, and time.   Skin: Skin is warm.       Wt Readings from Last 3 Encounters:   02/13/17 73.1 kg (161 lb 2.5 oz)   02/13/17 73.1 kg (161 lb 2.5 oz)   02/07/17 72.7 kg (160 lb 4.4 oz)     Temp Readings from Last 3 Encounters:   02/15/17 97.8 °F (36.6 °C)   02/14/17 98 °F (36.7 °C)   02/13/17 96.9 °F (36.1 °C) (Oral)     BP Readings from Last 3 Encounters:   02/15/17 (!) 151/79   02/14/17 107/61   02/13/17 116/60     Pulse Readings from Last 3 Encounters:   02/15/17 82   02/14/17 73   02/13/17 90         Assessment:       1. Acute myeloid leukemia not having achieved remission    2. Right foot pain        Plan:       I am not sure why he is having pain in that area. I guess gout could be a possibility, but the area in question is not in the ankle or the toes.  He is due for d4 of VIDAZA tomorrow and he will show me again the foot when he comes,. Ty;enol for pain in the time being  Continue Vidaza

## 2017-02-15 NOTE — PLAN OF CARE
Problem: Patient Care Overview  Goal: Plan of Care Review  Outcome: Ongoing (interventions implemented as appropriate)  My ankle hurts when i walk on it

## 2017-02-16 ENCOUNTER — INFUSION (OUTPATIENT)
Dept: INFUSION THERAPY | Facility: HOSPITAL | Age: 62
End: 2017-02-16
Attending: INTERNAL MEDICINE
Payer: MEDICARE

## 2017-02-16 VITALS
TEMPERATURE: 98 F | HEART RATE: 80 BPM | WEIGHT: 161.19 LBS | OXYGEN SATURATION: 99 % | DIASTOLIC BLOOD PRESSURE: 81 MMHG | SYSTOLIC BLOOD PRESSURE: 129 MMHG | BODY MASS INDEX: 24.43 KG/M2 | RESPIRATION RATE: 20 BRPM | HEIGHT: 68 IN

## 2017-02-16 DIAGNOSIS — C92.00 ACUTE MYELOID LEUKEMIA NOT HAVING ACHIEVED REMISSION: Primary | ICD-10-CM

## 2017-02-16 DIAGNOSIS — C92.02 ACUTE MYELOID LEUKEMIA IN RELAPSE: ICD-10-CM

## 2017-02-16 PROCEDURE — 63600175 PHARM REV CODE 636 W HCPCS: Mod: JW | Performed by: INTERNAL MEDICINE

## 2017-02-16 PROCEDURE — 96401 CHEMO ANTI-NEOPL SQ/IM: CPT

## 2017-02-16 PROCEDURE — 25000003 PHARM REV CODE 250: Performed by: INTERNAL MEDICINE

## 2017-02-16 RX ORDER — ONDANSETRON HYDROCHLORIDE 8 MG/1
8 TABLET, FILM COATED ORAL ONCE
Status: COMPLETED | OUTPATIENT
Start: 2017-02-16 | End: 2017-02-16

## 2017-02-16 RX ORDER — AZACITIDINE 100 MG/1
75 INJECTION, POWDER, LYOPHILIZED, FOR SOLUTION INTRAVENOUS; SUBCUTANEOUS
Status: COMPLETED | OUTPATIENT
Start: 2017-02-16 | End: 2017-02-16

## 2017-02-16 RX ADMIN — AZACITIDINE 140 MG: 100 INJECTION, POWDER, LYOPHILIZED, FOR SOLUTION INTRAVENOUS; SUBCUTANEOUS at 10:02

## 2017-02-16 RX ADMIN — ONDANSETRON 8 MG: 8 TABLET, FILM COATED ORAL at 10:02

## 2017-02-16 NOTE — PATIENT INSTRUCTIONS
[facility]  [address]  [address] [address]  [phone] phone     [phone] fax  Hours of Operation: Monday- Friday 8:00am- 5:00pm  After hours phone  [phone]  Hematology / Oncology Physicians on call      Dr. Tristan Gardiner    Please call with any concerns regarding your appointment today.    FALL PREVENTION   Falls often occur due to slipping, tripping or losing your balance. Here are ways to reduce your risk of falling again.   Was there anything that caused your fall that can be fixed, removed or replaced?   Make your home safe by keeping walkways clear of objects you may trip over.   Use non-slip pads under rugs.   Do not walk in poorly lit areas.   Do not stand on chairs or wobbly ladders.   Use caution when reaching overhead or looking upward. This position can cause a loss of balance.   Be sure your shoes fit properly, have non-slip bottoms and are in good condition.   Be cautious when going up and down stairs, curbs, and when walking on uneven sidewalks.   If your balance is poor, consider using a cane or walker.   If your fall was related to alcohol use, stop or limit alcohol intake.   If your fall was related to use of sleeping medicines, talk to your doctor about this. You may need to reduce your dosage at bedtime if you awaken during the night to go to the bathroom.   To reduce the need for nighttime bathroom trips:   Avoid drinking fluids for several hours before going to bed   Empty your bladder before going to bed   Men can keep a urinal at the bedside   © 0669-7289 [employer], [address] [place] [address]. All rights reserved. This information is not intended as a substitute for professional medical care. Always follow your healthcare professional's instructions.    HOME CARE AFTER CHEMOTHERAPY   Meals   Many patients feel sick and lose their appetites during treatment. Eat small meals  several times a day. Choose bland foods with little taste or smell if you have problems with nausea. Be sure to cook all food thoroughly. This kills bacteria and helps you avoid intestinal infection. Soft foods are easier to swallow and digest.   Activity   Exercise keeps you strong and keeps your heart and lungs active. Talk to your doctor about an appropriate exercise program for you.   Skin Care   To prevent a skin infection, bathe or shower once a day. Use a moisturizing soap and wash with warm water. Avoid very hot or cold water. Chemotherapy can make your skin dry . Apply moisturizing lotion to help relieve dry skin. Some drugs used in high doses can cause slight burns to appear (like sunburn). Ask for a special cream to help relieve the burn and protect your skin.   Prevent Mouth Sores   During chemotherapy, many people get mouth sores. Do the following to help prevent mouth sores or to ease discomfort.   Brush your teeth with a soft-bristle toothbrush after every meal.  Don't use dental floss if your platelet count is below 50,000. Your doctor or nurse will tell you if this is the case.  Use an oral swab or special soft toothbrush if your gums bleed during regular brushing.  Use mouthwash as directed. If you can't tolerate commercial mouthwash, use salt and baking soda to clean your mouth. Mix 1 teaspoon of salt and 1 teaspoon of baking soda into a glass of water. Swish and spit.  Call your doctor or return to this facility if you develop any of the following:   Sore throat   White patches in the mouth or throat   Fever of 100.4ºF (38ºC) or higher, or as directed by your healthcare provider  © 9968-7987 [employer], [address] [place] [address], [place] [address]. All rights reserved. This information is not intended as a substitute for professional medical care. Always follow your healthcare professional's     YOU HAVE STARTED ON CHEMOTHERAPY. IF YOU EXPERIENCE ANY OF THE FOLLOWING PROBLEMS, CALL THE OFFICE  IMMEDIATELY.    *FEVER .0 OR GREATER    *CHILLS, ESPECIALLY SHAKING CHILLS, OR SWEATING    *A SEVERE COUGH OR SORE THROAT, OR SINUS PAIN/     PRESSURE    *REDNESS, SWELLING, OR TENDERNESS AROUND A WOUND,     SORE, PIMPLE, RECTAL AREA, OR IV SITE    *SORES OR ULCERS IN THE MOUTH    *BLISTERS ON THE LIPS OR SKIN    *FREQUENT URGENCY TO URINATE OR A BURNING FEELING   WHEN YOU URINATE    *BLOOD IN THE URINE OR STOOL    *ANY UNEXPLAINED BRUISING OR PROLONGED BLEEDING,     (NOSEBLEEDS OR BLEEDING GUMS)    *LOOSE BOWEL MOVEMENTS THAT DO NOT RESPOND TO     IMODIUM OR MORE THAN THREE TIMES A DAY    *VOMITING UNRESPONSIVE TO ANTINAUSEA MEDICINE    *ANY UNUSUAL PHYSICAL SYMPTOMS THAT BEGAN AFTER     CHEMOTHERAPY    DURING WEEKDAYS, CALL AND ASK TO SPEAK DIRECTLY TO A NURSE.  AT OTHER TIMES, CALL THE OFFICE PHONE NUMBER; THE ANSWERING SERVICE WILL CONTACT THE ON-CALL PHYSICIAN.  SOMEONE IS AVAILABLE 24 HOURS A DAY, SEVEN DAYS A WEEK.

## 2017-02-16 NOTE — MR AVS SNAPSHOT
Patient Information     Patient Name Sex Carlos Morales Jr. Male 1955      Visit Information        Provider Department Dept Phone Center    2017 10:00 AM [facility] [phone] [facility]      Patient Instructions      [facility]  [address]  [address] [address]  [phone] phone     [phone] fax  Hours of Operation: Monday- Friday 8:00am- 5:00pm  After hours phone  [phone]  Hematology / Oncology Physicians on call      Dr. Tristan Gardiner    Please call with any concerns regarding your appointment today.    FALL PREVENTION   Falls often occur due to slipping, tripping or losing your balance. Here are ways to reduce your risk of falling again.   Was there anything that caused your fall that can be fixed, removed or replaced?   Make your home safe by keeping walkways clear of objects you may trip over.   Use non-slip pads under rugs.   Do not walk in poorly lit areas.   Do not stand on chairs or wobbly ladders.   Use caution when reaching overhead or looking upward. This position can cause a loss of balance.   Be sure your shoes fit properly, have non-slip bottoms and are in good condition.   Be cautious when going up and down stairs, curbs, and when walking on uneven sidewalks.   If your balance is poor, consider using a cane or walker.   If your fall was related to alcohol use, stop or limit alcohol intake.   If your fall was related to use of sleeping medicines, talk to your doctor about this. You may need to reduce your dosage at bedtime if you awaken during the night to go to the bathroom.   To reduce the need for nighttime bathroom trips:   Avoid drinking fluids for several hours before going to bed   Empty your bladder before going to bed   Men can keep a urinal at the bedside   © 9592-2520 [employer], [address] [place] [address], [place] [address]. All rights  reserved. This information is not intended as a substitute for professional medical care. Always follow your healthcare professional's instructions.    HOME CARE AFTER CHEMOTHERAPY   Meals   Many patients feel sick and lose their appetites during treatment. Eat small meals several times a day. Choose bland foods with little taste or smell if you have problems with nausea. Be sure to cook all food thoroughly. This kills bacteria and helps you avoid intestinal infection. Soft foods are easier to swallow and digest.   Activity   Exercise keeps you strong and keeps your heart and lungs active. Talk to your doctor about an appropriate exercise program for you.   Skin Care   To prevent a skin infection, bathe or shower once a day. Use a moisturizing soap and wash with warm water. Avoid very hot or cold water. Chemotherapy can make your skin dry . Apply moisturizing lotion to help relieve dry skin. Some drugs used in high doses can cause slight burns to appear (like sunburn). Ask for a special cream to help relieve the burn and protect your skin.   Prevent Mouth Sores   During chemotherapy, many people get mouth sores. Do the following to help prevent mouth sores or to ease discomfort.   Brush your teeth with a soft-bristle toothbrush after every meal.  Don't use dental floss if your platelet count is below 50,000. Your doctor or nurse will tell you if this is the case.  Use an oral swab or special soft toothbrush if your gums bleed during regular brushing.  Use mouthwash as directed. If you can't tolerate commercial mouthwash, use salt and baking soda to clean your mouth. Mix 1 teaspoon of salt and 1 teaspoon of baking soda into a glass of water. Swish and spit.  Call your doctor or return to this facility if you develop any of the following:   Sore throat   White patches in the mouth or throat   Fever of 100.4ºF (38ºC) or higher, or as directed by your healthcare provider  © 0589-0808 [employer], [address] [place] [address]  Road, [place] [address]. All rights reserved. This information is not intended as a substitute for professional medical care. Always follow your healthcare professional's     YOU HAVE STARTED ON CHEMOTHERAPY. IF YOU EXPERIENCE ANY OF THE FOLLOWING PROBLEMS, CALL THE OFFICE IMMEDIATELY.    *FEVER .0 OR GREATER    *CHILLS, ESPECIALLY SHAKING CHILLS, OR SWEATING    *A SEVERE COUGH OR SORE THROAT, OR SINUS PAIN/     PRESSURE    *REDNESS, SWELLING, OR TENDERNESS AROUND A WOUND,     SORE, PIMPLE, RECTAL AREA, OR IV SITE    *SORES OR ULCERS IN THE MOUTH    *BLISTERS ON THE LIPS OR SKIN    *FREQUENT URGENCY TO URINATE OR A BURNING FEELING   WHEN YOU URINATE    *BLOOD IN THE URINE OR STOOL    *ANY UNEXPLAINED BRUISING OR PROLONGED BLEEDING,     (NOSEBLEEDS OR BLEEDING GUMS)    *LOOSE BOWEL MOVEMENTS THAT DO NOT RESPOND TO     IMODIUM OR MORE THAN THREE TIMES A DAY    *VOMITING UNRESPONSIVE TO ANTINAUSEA MEDICINE    *ANY UNUSUAL PHYSICAL SYMPTOMS THAT BEGAN AFTER     CHEMOTHERAPY    DURING WEEKDAYS, CALL AND ASK TO SPEAK DIRECTLY TO A NURSE.  AT OTHER TIMES, CALL THE OFFICE PHONE NUMBER; THE ANSWERING SERVICE WILL CONTACT THE ON-CALL PHYSICIAN.  SOMEONE IS AVAILABLE 24 HOURS A DAY, SEVEN DAYS A WEEK.       Your Current Medications Are     ondansetron (ZOFRAN) 8 MG tablet    valacyclovir (VALTREX) 500 MG tablet      Facility-Administered Medications     azacitidine injection 140 mg    ondansetron tablet 8 mg      Appointments for Next Year     2/17/2017 10:00 AM INFUSION 030 MIN (30 min.) [facility]    Arrive at check-in approximately 15 minutes before your scheduled appointment time. Bring all outside medical records and imaging, along with a list of your current medications and insurance card.    (off [facility]) 1st floor    2/20/2017 12:30 PM NON FASTING LAB (5 min.) [facility]    Arrive at check-in approximately 15 minutes before your scheduled  "appointment time. Bring all outside medical records and imaging, along with a list of your current medications and insurance card.    (off [facility]) 2nd floor    2/20/2017  1:00 PM ESTABLISHED PATIENT (20 min.) [facility] Hemotology Oncology Lavell Flor MD    Arrive at check-in approximately 15 minutes before your scheduled appointment time. Bring all outside medical records and imaging, along with a list of your current medications and insurance card.    (off [facility]) 3rd Floor         Default Flowsheet Data (last 24 hours)      Amb Complex Vitals Rony        02/16/17 0952                Measurements    Weight 73.1 kg (161 lb 2.5 oz)        Height 5' 8" (1.727 m)        BSA (Calculated - sq m) 1.87 sq meters        BMI (Calculated) 24.6        /81        Temp 98 °F (36.7 °C)        Pulse 80        Resp 20        SpO2 99 %        Pain Assessment    Pain Score Zero                Allergies     No Known Allergies      Medications You Received from 02/15/2017 1101 to 02/16/2017 1101        Date/Time Order Dose Route Action     02/16/2017 1037 azacitidine injection 140 mg 140 mg Subcutaneous Given     02/16/2017 1004 ondansetron tablet 8 mg 8 mg Oral Given      Current Discharge Medication List     Cannot display discharge medications since this is not an admission.      "

## 2017-02-16 NOTE — PLAN OF CARE
Problem: Patient Care Overview  Goal: Plan of Care Review  Outcome: Ongoing (interventions implemented as appropriate)  Pt states that his ankle is feeling better than it did yesterday.

## 2017-02-17 ENCOUNTER — INFUSION (OUTPATIENT)
Dept: INFUSION THERAPY | Facility: HOSPITAL | Age: 62
End: 2017-02-17
Attending: INTERNAL MEDICINE
Payer: MEDICARE

## 2017-02-17 VITALS
DIASTOLIC BLOOD PRESSURE: 76 MMHG | WEIGHT: 161.19 LBS | HEART RATE: 71 BPM | OXYGEN SATURATION: 99 % | BODY MASS INDEX: 24.43 KG/M2 | SYSTOLIC BLOOD PRESSURE: 130 MMHG | HEIGHT: 68 IN | TEMPERATURE: 98 F | RESPIRATION RATE: 20 BRPM

## 2017-02-17 DIAGNOSIS — C92.00 ACUTE MYELOID LEUKEMIA NOT HAVING ACHIEVED REMISSION: Primary | ICD-10-CM

## 2017-02-17 DIAGNOSIS — C92.02 ACUTE MYELOID LEUKEMIA IN RELAPSE: ICD-10-CM

## 2017-02-17 PROCEDURE — 96401 CHEMO ANTI-NEOPL SQ/IM: CPT

## 2017-02-17 PROCEDURE — 63600175 PHARM REV CODE 636 W HCPCS: Performed by: INTERNAL MEDICINE

## 2017-02-17 PROCEDURE — 25000003 PHARM REV CODE 250: Performed by: INTERNAL MEDICINE

## 2017-02-17 RX ORDER — ONDANSETRON HYDROCHLORIDE 8 MG/1
8 TABLET, FILM COATED ORAL ONCE
Status: COMPLETED | OUTPATIENT
Start: 2017-02-17 | End: 2017-02-17

## 2017-02-17 RX ORDER — AZACITIDINE 100 MG/1
75 INJECTION, POWDER, LYOPHILIZED, FOR SOLUTION INTRAVENOUS; SUBCUTANEOUS
Status: COMPLETED | OUTPATIENT
Start: 2017-02-17 | End: 2017-02-17

## 2017-02-17 RX ADMIN — AZACITIDINE 140 MG: 100 INJECTION, POWDER, LYOPHILIZED, FOR SOLUTION INTRAVENOUS; SUBCUTANEOUS at 10:02

## 2017-02-17 RX ADMIN — ONDANSETRON 8 MG: 8 TABLET, FILM COATED ORAL at 09:02

## 2017-02-17 NOTE — MR AVS SNAPSHOT
Patient Information     Patient Name Sex Carlos Morales Jr. Male 1955      Visit Information        Provider Department Dept Phone Center    2017 10:00 AM [facility]fusion [phone] [facility]      Patient Instructions      [facility]  [address]  [address] [address]  [phone] phone     [phone] fax  Hours of Operation: Monday- Friday 8:00am- 5:00pm  After hours phone  [phone]  Hematology / Oncology Physicians on call      Dr. Tristan Gardiner    Please call with any concerns regarding your appointment today.    FALL PREVENTION   Falls often occur due to slipping, tripping or losing your balance. Here are ways to reduce your risk of falling again.   Was there anything that caused your fall that can be fixed, removed or replaced?   Make your home safe by keeping walkways clear of objects you may trip over.   Use non-slip pads under rugs.   Do not walk in poorly lit areas.   Do not stand on chairs or wobbly ladders.   Use caution when reaching overhead or looking upward. This position can cause a loss of balance.   Be sure your shoes fit properly, have non-slip bottoms and are in good condition.   Be cautious when going up and down stairs, curbs, and when walking on uneven sidewalks.   If your balance is poor, consider using a cane or walker.   If your fall was related to alcohol use, stop or limit alcohol intake.   If your fall was related to use of sleeping medicines, talk to your doctor about this. You may need to reduce your dosage at bedtime if you awaken during the night to go to the bathroom.   To reduce the need for nighttime bathroom trips:   Avoid drinking fluids for several hours before going to bed   Empty your bladder before going to bed   Men can keep a urinal at the bedside   © 0890-7694 [employer], [address] [place] [address], [place] [address]. All rights  reserved. This information is not intended as a substitute for professional medical care. Always follow your healthcare professional's instructions.    HOME CARE AFTER CHEMOTHERAPY   Meals   Many patients feel sick and lose their appetites during treatment. Eat small meals several times a day. Choose bland foods with little taste or smell if you have problems with nausea. Be sure to cook all food thoroughly. This kills bacteria and helps you avoid intestinal infection. Soft foods are easier to swallow and digest.   Activity   Exercise keeps you strong and keeps your heart and lungs active. Talk to your doctor about an appropriate exercise program for you.   Skin Care   To prevent a skin infection, bathe or shower once a day. Use a moisturizing soap and wash with warm water. Avoid very hot or cold water. Chemotherapy can make your skin dry . Apply moisturizing lotion to help relieve dry skin. Some drugs used in high doses can cause slight burns to appear (like sunburn). Ask for a special cream to help relieve the burn and protect your skin.   Prevent Mouth Sores   During chemotherapy, many people get mouth sores. Do the following to help prevent mouth sores or to ease discomfort.   Brush your teeth with a soft-bristle toothbrush after every meal.  Don't use dental floss if your platelet count is below 50,000. Your doctor or nurse will tell you if this is the case.  Use an oral swab or special soft toothbrush if your gums bleed during regular brushing.  Use mouthwash as directed. If you can't tolerate commercial mouthwash, use salt and baking soda to clean your mouth. Mix 1 teaspoon of salt and 1 teaspoon of baking soda into a glass of water. Swish and spit.  Call your doctor or return to this facility if you develop any of the following:   Sore throat   White patches in the mouth or throat   Fever of 100.4ºF (38ºC) or higher, or as directed by your healthcare provider  © 4928-8463 [employer], [address] [place] [address]  Road, [place] [address]. All rights reserved. This information is not intended as a substitute for professional medical care. Always follow your healthcare professional's     YOU HAVE STARTED ON CHEMOTHERAPY. IF YOU EXPERIENCE ANY OF THE FOLLOWING PROBLEMS, CALL THE OFFICE IMMEDIATELY.    *FEVER .0 OR GREATER    *CHILLS, ESPECIALLY SHAKING CHILLS, OR SWEATING    *A SEVERE COUGH OR SORE THROAT, OR SINUS PAIN/     PRESSURE    *REDNESS, SWELLING, OR TENDERNESS AROUND A WOUND,     SORE, PIMPLE, RECTAL AREA, OR IV SITE    *SORES OR ULCERS IN THE MOUTH    *BLISTERS ON THE LIPS OR SKIN    *FREQUENT URGENCY TO URINATE OR A BURNING FEELING   WHEN YOU URINATE    *BLOOD IN THE URINE OR STOOL    *ANY UNEXPLAINED BRUISING OR PROLONGED BLEEDING,     (NOSEBLEEDS OR BLEEDING GUMS)    *LOOSE BOWEL MOVEMENTS THAT DO NOT RESPOND TO     IMODIUM OR MORE THAN THREE TIMES A DAY    *VOMITING UNRESPONSIVE TO ANTINAUSEA MEDICINE    *ANY UNUSUAL PHYSICAL SYMPTOMS THAT BEGAN AFTER     CHEMOTHERAPY    DURING WEEKDAYS, CALL AND ASK TO SPEAK DIRECTLY TO A NURSE.  AT OTHER TIMES, CALL THE OFFICE PHONE NUMBER; THE ANSWERING SERVICE WILL CONTACT THE ON-CALL PHYSICIAN.  SOMEONE IS AVAILABLE 24 HOURS A DAY, SEVEN DAYS A WEEK.       Your Current Medications Are     ondansetron (ZOFRAN) 8 MG tablet    valacyclovir (VALTREX) 500 MG tablet      Facility-Administered Medications     azacitidine injection 140 mg    ondansetron tablet 8 mg      Appointments for Next Year     2/20/2017 12:30 PM NON FASTING LAB (5 min.) [facility] LABORATORY, [facility]    Arrive at check-in approximately 15 minutes before your scheduled appointment time. Bring all outside medical records and imaging, along with a list of your current medications and insurance card.    (off [facility]) 2nd floor    2/20/2017  1:00 PM ESTABLISHED PATIENT (20 min.) [facility] - Hemotology Oncology Lavell Flor MD    Arrive at check-in approximately 15 minutes before  "your scheduled appointment time. Bring all outside medical records and imaging, along with a list of your current medications and insurance card.    [facility] 3rd Floor         Default Flowsheet Data (last 24 hours)      Amb Complex Vitals Rony        02/17/17 0947                Measurements    Weight 73.1 kg (161 lb 2.5 oz)        Height 5' 8" (1.727 m)        BSA (Calculated - sq m) 1.87 sq meters        BMI (Calculated) 24.6        /76        Temp 97.7 °F (36.5 °C)        Pulse 71        Resp 20        SpO2 99 %        Pain Assessment    Pain Score One        Pain Frequency 2 Intermittent        Pain Loc ANKLE   right                Allergies     No Known Allergies      Medications You Received from 02/16/2017 1047 to 02/17/2017 1047        Date/Time Order Dose Route Action     02/17/2017 1041 azacitidine injection 140 mg 140 mg Subcutaneous Given     02/17/2017 0956 ondansetron tablet 8 mg 8 mg Oral Given      Current Discharge Medication List     Cannot display discharge medications since this is not an admission.      "

## 2017-02-17 NOTE — PLAN OF CARE
Problem: Patient Care Overview  Goal: Plan of Care Review  Outcome: Ongoing (interventions implemented as appropriate)  Pt states that he is happy that these are his last shots today for the next couple of weeks.

## 2017-02-17 NOTE — PATIENT INSTRUCTIONS
[facility]  [address]  [address] [address]  [phone] phone     [phone] fax  Hours of Operation: Monday- Friday 8:00am- 5:00pm  After hours phone  [phone]  Hematology / Oncology Physicians on call      Dr. Tristan Gardiner    Please call with any concerns regarding your appointment today.    FALL PREVENTION   Falls often occur due to slipping, tripping or losing your balance. Here are ways to reduce your risk of falling again.   Was there anything that caused your fall that can be fixed, removed or replaced?   Make your home safe by keeping walkways clear of objects you may trip over.   Use non-slip pads under rugs.   Do not walk in poorly lit areas.   Do not stand on chairs or wobbly ladders.   Use caution when reaching overhead or looking upward. This position can cause a loss of balance.   Be sure your shoes fit properly, have non-slip bottoms and are in good condition.   Be cautious when going up and down stairs, curbs, and when walking on uneven sidewalks.   If your balance is poor, consider using a cane or walker.   If your fall was related to alcohol use, stop or limit alcohol intake.   If your fall was related to use of sleeping medicines, talk to your doctor about this. You may need to reduce your dosage at bedtime if you awaken during the night to go to the bathroom.   To reduce the need for nighttime bathroom trips:   Avoid drinking fluids for several hours before going to bed   Empty your bladder before going to bed   Men can keep a urinal at the bedside   © 1920-3543 [employer], [address] [place] [address]. All rights reserved. This information is not intended as a substitute for professional medical care. Always follow your healthcare professional's instructions.    HOME CARE AFTER CHEMOTHERAPY   Meals   Many patients feel sick and lose their appetites during treatment. Eat small meals  several times a day. Choose bland foods with little taste or smell if you have problems with nausea. Be sure to cook all food thoroughly. This kills bacteria and helps you avoid intestinal infection. Soft foods are easier to swallow and digest.   Activity   Exercise keeps you strong and keeps your heart and lungs active. Talk to your doctor about an appropriate exercise program for you.   Skin Care   To prevent a skin infection, bathe or shower once a day. Use a moisturizing soap and wash with warm water. Avoid very hot or cold water. Chemotherapy can make your skin dry . Apply moisturizing lotion to help relieve dry skin. Some drugs used in high doses can cause slight burns to appear (like sunburn). Ask for a special cream to help relieve the burn and protect your skin.   Prevent Mouth Sores   During chemotherapy, many people get mouth sores. Do the following to help prevent mouth sores or to ease discomfort.   Brush your teeth with a soft-bristle toothbrush after every meal.  Don't use dental floss if your platelet count is below 50,000. Your doctor or nurse will tell you if this is the case.  Use an oral swab or special soft toothbrush if your gums bleed during regular brushing.  Use mouthwash as directed. If you can't tolerate commercial mouthwash, use salt and baking soda to clean your mouth. Mix 1 teaspoon of salt and 1 teaspoon of baking soda into a glass of water. Swish and spit.  Call your doctor or return to this facility if you develop any of the following:   Sore throat   White patches in the mouth or throat   Fever of 100.4ºF (38ºC) or higher, or as directed by your healthcare provider  © 4212-8785 [employer], [address] [place] [address], [place] [address]. All rights reserved. This information is not intended as a substitute for professional medical care. Always follow your healthcare professional's     YOU HAVE STARTED ON CHEMOTHERAPY. IF YOU EXPERIENCE ANY OF THE FOLLOWING PROBLEMS, CALL THE OFFICE  IMMEDIATELY.    *FEVER .0 OR GREATER    *CHILLS, ESPECIALLY SHAKING CHILLS, OR SWEATING    *A SEVERE COUGH OR SORE THROAT, OR SINUS PAIN/     PRESSURE    *REDNESS, SWELLING, OR TENDERNESS AROUND A WOUND,     SORE, PIMPLE, RECTAL AREA, OR IV SITE    *SORES OR ULCERS IN THE MOUTH    *BLISTERS ON THE LIPS OR SKIN    *FREQUENT URGENCY TO URINATE OR A BURNING FEELING   WHEN YOU URINATE    *BLOOD IN THE URINE OR STOOL    *ANY UNEXPLAINED BRUISING OR PROLONGED BLEEDING,     (NOSEBLEEDS OR BLEEDING GUMS)    *LOOSE BOWEL MOVEMENTS THAT DO NOT RESPOND TO     IMODIUM OR MORE THAN THREE TIMES A DAY    *VOMITING UNRESPONSIVE TO ANTINAUSEA MEDICINE    *ANY UNUSUAL PHYSICAL SYMPTOMS THAT BEGAN AFTER     CHEMOTHERAPY    DURING WEEKDAYS, CALL AND ASK TO SPEAK DIRECTLY TO A NURSE.  AT OTHER TIMES, CALL THE OFFICE PHONE NUMBER; THE ANSWERING SERVICE WILL CONTACT THE ON-CALL PHYSICIAN.  SOMEONE IS AVAILABLE 24 HOURS A DAY, SEVEN DAYS A WEEK.

## 2017-02-20 ENCOUNTER — OFFICE VISIT (OUTPATIENT)
Dept: HEMATOLOGY/ONCOLOGY | Facility: CLINIC | Age: 62
End: 2017-02-20
Payer: MEDICARE

## 2017-02-20 VITALS
RESPIRATION RATE: 18 BRPM | TEMPERATURE: 99 F | DIASTOLIC BLOOD PRESSURE: 80 MMHG | WEIGHT: 162.69 LBS | HEIGHT: 68 IN | BODY MASS INDEX: 24.66 KG/M2 | SYSTOLIC BLOOD PRESSURE: 130 MMHG | HEART RATE: 80 BPM | OXYGEN SATURATION: 98 %

## 2017-02-20 DIAGNOSIS — C92.00 ACUTE MYELOID LEUKEMIA NOT HAVING ACHIEVED REMISSION: Primary | ICD-10-CM

## 2017-02-20 PROCEDURE — 99214 OFFICE O/P EST MOD 30 MIN: CPT | Mod: S$GLB,,, | Performed by: INTERNAL MEDICINE

## 2017-02-20 PROCEDURE — 99999 PR PBB SHADOW E&M-EST. PATIENT-LVL III: CPT | Mod: PBBFAC,,, | Performed by: INTERNAL MEDICINE

## 2017-02-20 NOTE — PROGRESS NOTES
Subjective:       Patient ID: Carlos Jordan Jr. is a 62 y.o. male.    Chief Complaint: Follow-up    HPI      []Hide copied text  Subjective:        Patient ID: Carlos Jordan Jr. is a 62 y.o. male.     Chief Complaint: No chief complaint on file.     HPI  This is a 62 year-old gentleman who comes for follow up of his AML.  He was found to have pancytopenia during a routine cbc done at the ER because of other complains.  A bone marrow was done on 11/4/2016. 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  He said he was not interested in discussing a stem cell transplant .  He said he would prefer to be treated with a low toxicity agent like Vidaza which would allow him to be treated as an outpatient.  He came in for  C3  d8 of VIDAZA  He compalined to the nurses of having pain in thr right foot and thr nurse asked me to see himALLERGIES: None.      MEDICATIONS: None.      PREVIOUS SURGERIES: Amputation of the tip of the right middle finger.      SOCIAL HISTORY: , has one child. He lives in [place]. He smokes half a  pack a day and has been doing this for 35 years. Denies significant drinking.   He is retired.      FAMILY HISTORY: Brother had brain tumor. Father had diabetes. No heart   attacks.      PAST MEDICAL HISTORY:  1. AML  2. Tobacco use.    3-thrombocytopenia  Review of Systems   Constitutional: Negative. Negative for fatigue.   Eyes: Negative.   Cardiovascular: Negative. Negative for chest pain.   Gastrointestinal: Negative for abdominal pain and nausea.   Genitourinary: Negative. Negative for hematuria.   Musculoskeletal: Negative.   Skin: Negative.   Neurological: Negative.   Psychiatric/Behavioral: Negative.       Objective:      Physical Exam   Constitutional: He is oriented to person,  place, and time. He appears well-developed.   HENT:   Head: Normocephalic.   Eyes: Pupils are equal, round, and reactive to light.   Neck: Normal range of motion.   Cardiovascular: Normal rate.   Pulmonary/Chest: Effort normal. No respiratory distress.   Musculoskeletal: Normal range of motion.           Wt Readings from Last 3 Encounters:   02/20/17 73.8 kg (162 lb 11.2 oz)   02/17/17 73.1 kg (161 lb 2.5 oz)   02/16/17 73.1 kg (161 lb 2.5 oz)     Temp Readings from Last 3 Encounters:   02/20/17 98.5 °F (36.9 °C) (Oral)   02/17/17 97.7 °F (36.5 °C)   02/16/17 98 °F (36.7 °C)     BP Readings from Last 3 Encounters:   02/20/17 130/80   02/17/17 130/76   02/16/17 129/81     Pulse Readings from Last 3 Encounters:   02/20/17 80   02/17/17 71   02/16/17 80                                V       Assessment:       1. Acute myeloid leukemia not having achieved remission             Plan:         Lab Results   Component Value Date    WBC 1.87 (LL) 02/20/2017    HGB 9.0 (L) 02/20/2017    HCT 26.9 (L) 02/20/2017     (H) 02/20/2017    PLT 44 (L) 02/20/2017       Continue to monitor lab tests. See me in 7 days with a cbc                  Office Visit on 2/15/2017                Review of Systems    Objective:      Physical Exam    Assessment:       1. Acute myeloid leukemia not having achieved remission        Plan:       As above

## 2017-02-27 ENCOUNTER — LAB VISIT (OUTPATIENT)
Dept: LAB | Facility: HOSPITAL | Age: 62
End: 2017-02-27
Attending: INTERNAL MEDICINE
Payer: MEDICARE

## 2017-02-27 ENCOUNTER — OFFICE VISIT (OUTPATIENT)
Dept: HEMATOLOGY/ONCOLOGY | Facility: CLINIC | Age: 62
End: 2017-02-27
Payer: MEDICARE

## 2017-02-27 VITALS
SYSTOLIC BLOOD PRESSURE: 120 MMHG | OXYGEN SATURATION: 100 % | HEIGHT: 68 IN | BODY MASS INDEX: 24.52 KG/M2 | HEART RATE: 73 BPM | DIASTOLIC BLOOD PRESSURE: 80 MMHG | TEMPERATURE: 99 F | RESPIRATION RATE: 18 BRPM | WEIGHT: 161.81 LBS

## 2017-02-27 DIAGNOSIS — D69.6 THROMBOCYTOPENIA: ICD-10-CM

## 2017-02-27 DIAGNOSIS — C92.02 ACUTE MYELOID LEUKEMIA IN RELAPSE: Primary | ICD-10-CM

## 2017-02-27 DIAGNOSIS — C92.00 ACUTE MYELOID LEUKEMIA NOT HAVING ACHIEVED REMISSION: ICD-10-CM

## 2017-02-27 LAB
ACANTHOCYTES BLD QL SMEAR: PRESENT
ANISOCYTOSIS BLD QL SMEAR: SLIGHT
BASOPHILS # BLD AUTO: 0 K/UL
BASOPHILS NFR BLD: 0 %
DACRYOCYTES BLD QL SMEAR: ABNORMAL
DIFFERENTIAL METHOD: ABNORMAL
EOSINOPHIL # BLD AUTO: 0.1 K/UL
EOSINOPHIL NFR BLD: 7.9 %
ERYTHROCYTE [DISTWIDTH] IN BLOOD BY AUTOMATED COUNT: 14.7 %
HCT VFR BLD AUTO: 27.8 %
HGB BLD-MCNC: 9.3 G/DL
HYPOCHROMIA BLD QL SMEAR: ABNORMAL
LYMPHOCYTES # BLD AUTO: 1.2 K/UL
LYMPHOCYTES NFR BLD: 78.3 %
MCH RBC QN AUTO: 38.3 PG
MCHC RBC AUTO-ENTMCNC: 33.5 %
MCV RBC AUTO: 114 FL
MONOCYTES # BLD AUTO: 0.1 K/UL
MONOCYTES NFR BLD: 5.9 %
NEUTROPHILS # BLD AUTO: 0.1 K/UL
NEUTROPHILS NFR BLD: 7.9 %
PLATELET # BLD AUTO: 44 K/UL
PLATELET BLD QL SMEAR: ABNORMAL
PMV BLD AUTO: 10.1 FL
POIKILOCYTOSIS BLD QL SMEAR: SLIGHT
POLYCHROMASIA BLD QL SMEAR: ABNORMAL
RBC # BLD AUTO: 2.43 M/UL
SCHISTOCYTES BLD QL SMEAR: PRESENT
WBC # BLD AUTO: 1.52 K/UL

## 2017-02-27 PROCEDURE — 99214 OFFICE O/P EST MOD 30 MIN: CPT | Mod: S$GLB,,, | Performed by: INTERNAL MEDICINE

## 2017-02-27 PROCEDURE — 85025 COMPLETE CBC W/AUTO DIFF WBC: CPT | Mod: PO

## 2017-02-27 PROCEDURE — 99999 PR PBB SHADOW E&M-EST. PATIENT-LVL III: CPT | Mod: PBBFAC,,, | Performed by: INTERNAL MEDICINE

## 2017-02-27 PROCEDURE — 36415 COLL VENOUS BLD VENIPUNCTURE: CPT | Mod: PO

## 2017-02-27 PROCEDURE — 1160F RVW MEDS BY RX/DR IN RCRD: CPT | Mod: S$GLB,,, | Performed by: INTERNAL MEDICINE

## 2017-02-27 NOTE — PROGRESS NOTES
Subjective:       Patient ID: Carlos Jordan Jr. is a 62 y.o. male.    Chief Complaint: Follow-up    HPI This is a 62 year-old gentleman who comes for follow up of his AML.  He was found to have pancytopenia during a routine cbc done at the ER because of other complains.  A bone marrow was done on 11/4/2016. 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  He said he was not interested in discussing a stem cell transplant .  He said he would prefer to be treated with a low toxicity agent like Vidaza which would allow him to be treated as an outpatient.  He came in for C3  d15 of VIDAZA   ALLERGIES: None.      MEDICATIONS: None.      PREVIOUS SURGERIES: Amputation of the tip of the right middle finger.      SOCIAL HISTORY: , has one child. He lives in [place]. He smokes half a  pack a day and has been doing this for 35 years. Denies significant drinking.   He is retired.      FAMILY HISTORY: Brother had brain tumor. Father had diabetes. No heart   attacks.      PAST MEDICAL HISTORY:  1. AML  2. Tobacco use.    3-thrombocytopenia  Review of Systems   Constitutional: Negative.  Negative for fatigue.   Eyes: Negative.    Cardiovascular: Negative.  Negative for chest pain.   Gastrointestinal: Negative for abdominal pain and nausea.   Genitourinary: Negative.  Negative for hematuria.   Musculoskeletal: Negative.    Skin: Negative.    Neurological: Negative.    Psychiatric/Behavioral: Negative.        Objective:      Physical Exam   Constitutional: He is oriented to person, place, and time. He appears well-developed and well-nourished.   HENT:   Head: Normocephalic.   Mouth/Throat: No oropharyngeal exudate.   Eyes: Pupils are equal, round, and reactive to light.   Neck: No thyromegaly present.    Cardiovascular: Normal rate, regular rhythm and normal heart sounds.  Exam reveals no gallop.    No murmur heard.  Pulmonary/Chest: No respiratory distress. He has no wheezes. He has no rales.   Abdominal: Soft. Bowel sounds are normal. He exhibits no distension and no mass. There is no rebound and no guarding.   Musculoskeletal: Normal range of motion. He exhibits no edema.   Lymphadenopathy:     He has no cervical adenopathy.   Neurological: He is alert and oriented to person, place, and time.   Skin: Skin is warm and dry.   Psychiatric: He has a normal mood and affect. His behavior is normal.       Wt Readings from Last 3 Encounters:   02/27/17 73.4 kg (161 lb 13.1 oz)   02/20/17 73.8 kg (162 lb 11.2 oz)   02/17/17 73.1 kg (161 lb 2.5 oz)     Temp Readings from Last 3 Encounters:   02/27/17 98.8 °F (37.1 °C) (Oral)   02/20/17 98.5 °F (36.9 °C) (Oral)   02/17/17 97.7 °F (36.5 °C)     BP Readings from Last 3 Encounters:   02/27/17 120/80   02/20/17 130/80   02/17/17 130/76     Pulse Readings from Last 3 Encounters:   02/27/17 73   02/20/17 80   02/17/17 71       Assessment:       1. Acute myeloid leukemia in relapse    2. Thrombocytopenia        Plan:       ,lastcbc  Counts remained down. No need for transfusions. See me in 7 days with a cbc.  Aware of need to elt us know if he ahs a fever

## 2017-03-06 ENCOUNTER — OFFICE VISIT (OUTPATIENT)
Dept: HEMATOLOGY/ONCOLOGY | Facility: CLINIC | Age: 62
End: 2017-03-06
Payer: MEDICARE

## 2017-03-06 ENCOUNTER — LAB VISIT (OUTPATIENT)
Dept: LAB | Facility: HOSPITAL | Age: 62
End: 2017-03-06
Attending: INTERNAL MEDICINE
Payer: MEDICARE

## 2017-03-06 VITALS
TEMPERATURE: 98 F | OXYGEN SATURATION: 98 % | DIASTOLIC BLOOD PRESSURE: 68 MMHG | HEART RATE: 96 BPM | HEIGHT: 68 IN | SYSTOLIC BLOOD PRESSURE: 126 MMHG | BODY MASS INDEX: 24.22 KG/M2 | WEIGHT: 159.81 LBS

## 2017-03-06 DIAGNOSIS — D69.6 THROMBOCYTOPENIA: ICD-10-CM

## 2017-03-06 DIAGNOSIS — C92.00 ACUTE MYELOID LEUKEMIA NOT HAVING ACHIEVED REMISSION: Primary | ICD-10-CM

## 2017-03-06 DIAGNOSIS — C92.02 ACUTE MYELOID LEUKEMIA IN RELAPSE: ICD-10-CM

## 2017-03-06 LAB
BASOPHILS # BLD AUTO: 0.01 K/UL
BASOPHILS NFR BLD: 0.6 %
DIFFERENTIAL METHOD: ABNORMAL
EOSINOPHIL # BLD AUTO: 0.1 K/UL
EOSINOPHIL NFR BLD: 4 %
ERYTHROCYTE [DISTWIDTH] IN BLOOD BY AUTOMATED COUNT: 14.4 %
HCT VFR BLD AUTO: 29.1 %
HGB BLD-MCNC: 9.8 G/DL
LYMPHOCYTES # BLD AUTO: 1.3 K/UL
LYMPHOCYTES NFR BLD: 75.1 %
MCH RBC QN AUTO: 39.4 PG
MCHC RBC AUTO-ENTMCNC: 33.7 %
MCV RBC AUTO: 117 FL
MONOCYTES # BLD AUTO: 0.2 K/UL
MONOCYTES NFR BLD: 11.9 %
NEUTROPHILS # BLD AUTO: 0.2 K/UL
NEUTROPHILS NFR BLD: 8.4 %
PLATELET # BLD AUTO: 130 K/UL
PLATELET BLD QL SMEAR: ABNORMAL
PMV BLD AUTO: 9.4 FL
RBC # BLD AUTO: 2.49 M/UL
WBC # BLD AUTO: 1.77 K/UL

## 2017-03-06 PROCEDURE — 99999 PR PBB SHADOW E&M-EST. PATIENT-LVL III: CPT | Mod: PBBFAC,,, | Performed by: INTERNAL MEDICINE

## 2017-03-06 PROCEDURE — 99214 OFFICE O/P EST MOD 30 MIN: CPT | Mod: S$GLB,,, | Performed by: INTERNAL MEDICINE

## 2017-03-06 PROCEDURE — 36415 COLL VENOUS BLD VENIPUNCTURE: CPT

## 2017-03-06 PROCEDURE — 1160F RVW MEDS BY RX/DR IN RCRD: CPT | Mod: S$GLB,,, | Performed by: INTERNAL MEDICINE

## 2017-03-06 PROCEDURE — 85027 COMPLETE CBC AUTOMATED: CPT

## 2017-03-06 PROCEDURE — 85025 COMPLETE CBC W/AUTO DIFF WBC: CPT

## 2017-03-06 NOTE — PROGRESS NOTES
Subjective:       Patient ID: Carlos Jordan Jr. is a 62 y.o. male.    Chief Complaint: No chief complaint on file.    HPI This is a 62 year-old gentleman who comes for follow up of his AML.  He was found to have pancytopenia during a routine cbc done at the ER because of other complains.  A bone marrow was done on 11/4/2016. 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  He said he was not interested in discussing a stem cell transplant .  He said he would prefer to be treated with a low toxicity agent like Vidaza which would allow him to be treated as an outpatient.  He came in for C3  d21 of VIDAZA  He  Says she is doing well.    Review of Systems   Constitutional: Negative.  Negative for fatigue.   Eyes: Negative.    Cardiovascular: Negative.  Negative for chest pain.   Gastrointestinal: Negative for abdominal pain and nausea.   Genitourinary: Negative.  Negative for hematuria.   Musculoskeletal: Negative.    Skin: Negative.    Neurological: Negative.    Psychiatric/Behavioral: Negative.        Objective:      Physical Exam   Constitutional: He is oriented to person, place, and time. He appears well-developed and well-nourished.   HENT:   Head: Normocephalic.   Mouth/Throat: No oropharyngeal exudate.   Eyes: Pupils are equal, round, and reactive to light.   Neck: No thyromegaly present.   Cardiovascular: Normal rate, regular rhythm and normal heart sounds.  Exam reveals no gallop.    No murmur heard.  Pulmonary/Chest: No respiratory distress. He has no wheezes. He has no rales.   Abdominal: Soft. Bowel sounds are normal. He exhibits no distension and no mass. There is no rebound and no guarding.   Musculoskeletal: Normal range of motion. He exhibits no edema.   Lymphadenopathy:     He has no cervical  adenopathy.   Neurological: He is alert and oriented to person, place, and time.   Skin: Skin is warm and dry.   Psychiatric: He has a normal mood and affect. His behavior is normal.       Wt Readings from Last 3 Encounters:   03/06/17 72.5 kg (159 lb 13.3 oz)   02/27/17 73.4 kg (161 lb 13.1 oz)   02/20/17 73.8 kg (162 lb 11.2 oz)     Temp Readings from Last 3 Encounters:   03/06/17 98.1 °F (36.7 °C) (Oral)   02/27/17 98.8 °F (37.1 °C) (Oral)   02/20/17 98.5 °F (36.9 °C) (Oral)     BP Readings from Last 3 Encounters:   03/06/17 126/68   02/27/17 120/80   02/20/17 130/80     Pulse Readings from Last 3 Encounters:   03/06/17 96   02/27/17 73   02/20/17 80       Assessment:       1. Acute myeloid leukemia not having achieved remission    2. Thrombocytopenia        Plan:       Lab Results   Component Value Date    WBC 1.77 (LL) 03/06/2017    HGB 9.8 (L) 03/06/2017    HCT 29.1 (L) 03/06/2017     (H) 03/06/2017     (L) 03/06/2017       counts are  stable. See me in 7 days with a cbc  To  start of C4.  Asked to meet with

## 2017-03-08 ENCOUNTER — TELEPHONE (OUTPATIENT)
Dept: HEMATOLOGY/ONCOLOGY | Facility: CLINIC | Age: 62
End: 2017-03-08

## 2017-03-08 NOTE — TELEPHONE ENCOUNTER
----- Message from Aliza Moore sent at 3/8/2017  1:17 PM CST -----  Contact: Suzie Bush  She said if you need the benefit investigation result on Vidaza, call her at [phone] ext [phone].                                               sánchez

## 2017-03-13 ENCOUNTER — OFFICE VISIT (OUTPATIENT)
Dept: HEMATOLOGY/ONCOLOGY | Facility: CLINIC | Age: 62
End: 2017-03-13
Payer: MEDICARE

## 2017-03-13 ENCOUNTER — INFUSION (OUTPATIENT)
Dept: INFUSION THERAPY | Facility: HOSPITAL | Age: 62
End: 2017-03-13
Attending: INTERNAL MEDICINE
Payer: MEDICARE

## 2017-03-13 VITALS
TEMPERATURE: 99 F | HEART RATE: 68 BPM | HEIGHT: 68 IN | DIASTOLIC BLOOD PRESSURE: 69 MMHG | OXYGEN SATURATION: 95 % | BODY MASS INDEX: 24.56 KG/M2 | WEIGHT: 162.06 LBS | RESPIRATION RATE: 18 BRPM | SYSTOLIC BLOOD PRESSURE: 110 MMHG

## 2017-03-13 VITALS — WEIGHT: 162.06 LBS | BODY MASS INDEX: 24.56 KG/M2 | HEIGHT: 68 IN

## 2017-03-13 DIAGNOSIS — C92.02 ACUTE MYELOID LEUKEMIA IN RELAPSE: ICD-10-CM

## 2017-03-13 DIAGNOSIS — C92.00 ACUTE MYELOID LEUKEMIA NOT HAVING ACHIEVED REMISSION: Primary | ICD-10-CM

## 2017-03-13 DIAGNOSIS — D69.6 THROMBOCYTOPENIA: ICD-10-CM

## 2017-03-13 PROCEDURE — 63600175 PHARM REV CODE 636 W HCPCS: Mod: PO | Performed by: INTERNAL MEDICINE

## 2017-03-13 PROCEDURE — 1160F RVW MEDS BY RX/DR IN RCRD: CPT | Mod: S$GLB,,, | Performed by: INTERNAL MEDICINE

## 2017-03-13 PROCEDURE — 25000003 PHARM REV CODE 250: Mod: PO | Performed by: INTERNAL MEDICINE

## 2017-03-13 PROCEDURE — 96401 CHEMO ANTI-NEOPL SQ/IM: CPT | Mod: PO

## 2017-03-13 PROCEDURE — 99999 PR PBB SHADOW E&M-EST. PATIENT-LVL III: CPT | Mod: PBBFAC,,, | Performed by: INTERNAL MEDICINE

## 2017-03-13 PROCEDURE — 99214 OFFICE O/P EST MOD 30 MIN: CPT | Mod: 25,S$GLB,, | Performed by: INTERNAL MEDICINE

## 2017-03-13 RX ORDER — AZACITIDINE 100 MG/1
75 INJECTION, POWDER, LYOPHILIZED, FOR SOLUTION INTRAVENOUS; SUBCUTANEOUS
Status: CANCELLED | OUTPATIENT
Start: 2017-03-15

## 2017-03-13 RX ORDER — ONDANSETRON 4 MG/1
8 TABLET, FILM COATED ORAL ONCE
Status: COMPLETED | OUTPATIENT
Start: 2017-03-13 | End: 2017-03-13

## 2017-03-13 RX ORDER — ONDANSETRON 4 MG/1
8 TABLET, FILM COATED ORAL ONCE
Status: CANCELLED
Start: 2017-03-14 | End: 2017-03-14

## 2017-03-13 RX ORDER — ONDANSETRON 4 MG/1
8 TABLET, FILM COATED ORAL ONCE
Status: CANCELLED
Start: 2017-03-15 | End: 2017-03-15

## 2017-03-13 RX ORDER — AZACITIDINE 100 MG/1
75 INJECTION, POWDER, LYOPHILIZED, FOR SOLUTION INTRAVENOUS; SUBCUTANEOUS
Status: CANCELLED | OUTPATIENT
Start: 2017-03-16

## 2017-03-13 RX ORDER — ONDANSETRON 4 MG/1
8 TABLET, FILM COATED ORAL ONCE
Status: CANCELLED
Start: 2017-03-17 | End: 2017-03-17

## 2017-03-13 RX ORDER — AZACITIDINE 100 MG/1
75 INJECTION, POWDER, LYOPHILIZED, FOR SOLUTION INTRAVENOUS; SUBCUTANEOUS
Status: COMPLETED | OUTPATIENT
Start: 2017-03-13 | End: 2017-03-13

## 2017-03-13 RX ORDER — AZACITIDINE 100 MG/1
75 INJECTION, POWDER, LYOPHILIZED, FOR SOLUTION INTRAVENOUS; SUBCUTANEOUS
Status: CANCELLED | OUTPATIENT
Start: 2017-03-17

## 2017-03-13 RX ORDER — AZACITIDINE 100 MG/1
75 INJECTION, POWDER, LYOPHILIZED, FOR SOLUTION INTRAVENOUS; SUBCUTANEOUS
Status: CANCELLED | OUTPATIENT
Start: 2017-03-13

## 2017-03-13 RX ORDER — AZACITIDINE 100 MG/1
75 INJECTION, POWDER, LYOPHILIZED, FOR SOLUTION INTRAVENOUS; SUBCUTANEOUS
Status: CANCELLED | OUTPATIENT
Start: 2017-03-14

## 2017-03-13 RX ORDER — ONDANSETRON 4 MG/1
8 TABLET, FILM COATED ORAL ONCE
Status: CANCELLED
Start: 2017-03-16 | End: 2017-03-16

## 2017-03-13 RX ORDER — ONDANSETRON 4 MG/1
8 TABLET, FILM COATED ORAL ONCE
Status: CANCELLED
Start: 2017-03-13 | End: 2017-03-13

## 2017-03-13 RX ADMIN — AZACITIDINE 140 MG: 100 INJECTION, POWDER, LYOPHILIZED, FOR SOLUTION INTRAVENOUS; SUBCUTANEOUS at 11:03

## 2017-03-13 RX ADMIN — ONDANSETRON 8 MG: 4 TABLET, FILM COATED ORAL at 10:03

## 2017-03-13 NOTE — PLAN OF CARE
Problem: Patient Care Overview  Goal: Plan of Care Review  Outcome: Ongoing (interventions implemented as appropriate)  Pt states he is alright

## 2017-03-13 NOTE — PROGRESS NOTES
Subjective:       Patient ID: Carlos Jordan Jr. is a 62 y.o. male.    Chief Complaint: Leukemia; Chemotherapy; and Results (labs)    HPI This is a 62 year-old gentleman who comes for follow up of his AML.  He was found to have pancytopenia during a routine cbc done at the ER because of other complains.  A bone marrow was done on 11/4/2016. 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  He said he was not interested in discussing a stem cell transplant .  He said he would prefer to be treated with a low toxicity agent like Vidaza which would allow him to be treated as an outpatient.  He came in for C3  d28 or c4 d1  of VIDAZA  He says he feels well  Review of Systems   Constitutional: Positive for unexpected weight change. Negative for appetite change.   Eyes: Negative for visual disturbance.   Respiratory: Positive for cough. Negative for shortness of breath.    Cardiovascular: Negative for chest pain.   Gastrointestinal: Positive for diarrhea. Negative for abdominal pain.   Genitourinary: Negative for frequency.   Musculoskeletal: Negative for back pain.   Skin: Negative for rash.   Neurological: Negative for headaches.   Hematological: Negative for adenopathy.       Objective:      Physical Exam   Constitutional: He is oriented to person, place, and time. He appears well-developed and well-nourished.   HENT:   Head: Normocephalic.   Mouth/Throat: No oropharyngeal exudate.   Eyes: Pupils are equal, round, and reactive to light.   Neck: No thyromegaly present.   Cardiovascular: Normal rate, regular rhythm and normal heart sounds.  Exam reveals no gallop.    No murmur heard.  Pulmonary/Chest: No respiratory distress. He has no wheezes. He has no rales.   Abdominal: Soft. Bowel sounds are normal. He  exhibits no distension and no mass. There is no rebound and no guarding.   Musculoskeletal: Normal range of motion. He exhibits no edema.   Lymphadenopathy:     He has no cervical adenopathy.   Neurological: He is alert and oriented to person, place, and time.   Skin: Skin is warm and dry.   Psychiatric: He has a normal mood and affect. His behavior is normal.     .  Wt Readings from Last 3 Encounters:   03/13/17 73.5 kg (162 lb 0.6 oz)   03/13/17 73.5 kg (162 lb 0.6 oz)   03/06/17 72.5 kg (159 lb 13.3 oz)     Temp Readings from Last 3 Encounters:   03/13/17 98.7 °F (37.1 °C) (Oral)   03/06/17 98.1 °F (36.7 °C) (Oral)   02/27/17 98.8 °F (37.1 °C) (Oral)     BP Readings from Last 3 Encounters:   03/13/17 110/69   03/06/17 126/68   02/27/17 120/80     Pulse Readings from Last 3 Encounters:   03/13/17 68   03/06/17 96   02/27/17 73       Assessment:       1. Acute myeloid leukemia not having achieved remission    2. Thrombocytopenia        Plan:       Lab Results   Component Value Date    WBC 2.01 (L) 03/13/2017    HGB 10.9 (L) 03/13/2017    HCT 32.7 (L) 03/13/2017     (H) 03/13/2017    PLT 81 (L) 03/13/2017       He will start C4. Diogenesan is for him to have bone marrow afetr C6.  Asked to stop smoking. Symptomatic treatment for diarrhea. See me 10 days with a cbc

## 2017-03-13 NOTE — MR AVS SNAPSHOT
Patient Information     Patient Name Sex Carlos Morales Jr. Male 1955      Visit Information        Provider Department Dep Phone Center    3/13/2017 10:30 AM [facility] [phone] [facility]      Patient Instructions     None      Your Current Medications Are     ondansetron (ZOFRAN) 8 MG tablet    valacyclovir (VALTREX) 500 MG tablet      Facility-Administered Medications     azacitidine injection 140 mg    ondansetron tablet 8 mg      Appointments for Next Year     3/14/2017 10:00 AM INFUSION 060 MIN (60 min.) [facility] CHAIR 03 ONLH    Arrive at check-in approximately 15 minutes before your scheduled appointment time. Bring all outside medical records and imaging, along with a list of your current medications and insurance card.    (off [facility]) [facility] floor    3/15/2017 10:00 AM INFUSION 060 MIN (60 min.) [facility] CHAIR 02 ONLH    Arrive at check-in approximately 15 minutes before your scheduled appointment time. Bring all outside medical records and imaging, along with a list of your current medications and insurance card.    (off [facility]) [facility] floor    3/16/2017 10:00 AM INFUSION 060 MIN (60 min.) [facility] CHAIR 03 ONLH    Arrive at check-in approximately 15 minutes before your scheduled appointment time. Bring all outside medical records and imaging, along with a list of your current medications and insurance card.    (off [facility]) [facility] floor    3/17/2017 10:00 AM INFUSION 060 MIN (60 min.) [facility] CHAIR 01 ONLH    Arrive at check-in approximately 15 minutes before your scheduled appointment time. Bring all outside medical records and imaging, along with a list of your current medications and insurance card.    (off [facility]) [facility] floor    3/22/2017 10:30 AM NON FASTING LAB (5 min.) [facility]    Arrive at check-in approximately 15 minutes before your  "scheduled appointment time. Bring all outside medical records and imaging, along with a list of your current medications and insurance card.    (off [facility]) 2nd floor    3/22/2017 11:00 AM ESTABLISHED PATIENT (20 min.) [facility] Oncology Lavell Flor MD    Arrive at check-in approximately 15 minutes before your scheduled appointment time. Bring all outside medical records and imaging, along with a list of your current medications and insurance card.    (off [facility]) 3rd Floor         Default Flowsheet Data (last 24 hours)      Amb Complex Vitals Rony        03/13/17 1029 03/13/17 0955             Measurements    Weight 73.5 kg (162 lb 0.6 oz) 73.5 kg (162 lb 0.6 oz)       Height 5' 8" (1.727 m) 5' 8" (1.727 m)       BSA (Calculated - sq m) 1.88 sq meters 1.88 sq meters       BMI (Calculated) 24.7 24.7       BP  110/69       Temp  98.7 °F (37.1 °C)       Pulse  68       Resp  18       SpO2  95 %       Pain Assessment    Pain Score Zero Zero               Allergies     No Known Allergies      Medications You Received from 03/12/2017 1107 to 03/13/2017 1107        Date/Time Order Dose Route Action     03/13/2017 1101 azacitidine injection 140 mg 140 mg Subcutaneous Given     03/13/2017 1031 ondansetron tablet 8 mg 8 mg Oral Given      Current Discharge Medication List     Cannot display discharge medications since this is not an admission.      "

## 2017-03-14 ENCOUNTER — INFUSION (OUTPATIENT)
Dept: INFUSION THERAPY | Facility: HOSPITAL | Age: 62
End: 2017-03-14
Attending: INTERNAL MEDICINE
Payer: MEDICARE

## 2017-03-14 VITALS
TEMPERATURE: 98 F | HEART RATE: 66 BPM | RESPIRATION RATE: 18 BRPM | DIASTOLIC BLOOD PRESSURE: 67 MMHG | SYSTOLIC BLOOD PRESSURE: 108 MMHG

## 2017-03-14 DIAGNOSIS — C92.02 ACUTE MYELOID LEUKEMIA IN RELAPSE: ICD-10-CM

## 2017-03-14 DIAGNOSIS — C92.00 ACUTE MYELOID LEUKEMIA NOT HAVING ACHIEVED REMISSION: Primary | ICD-10-CM

## 2017-03-14 PROCEDURE — 63600175 PHARM REV CODE 636 W HCPCS: Performed by: INTERNAL MEDICINE

## 2017-03-14 PROCEDURE — 25000003 PHARM REV CODE 250: Performed by: INTERNAL MEDICINE

## 2017-03-14 PROCEDURE — 96401 CHEMO ANTI-NEOPL SQ/IM: CPT

## 2017-03-14 RX ORDER — AZACITIDINE 100 MG/1
75 INJECTION, POWDER, LYOPHILIZED, FOR SOLUTION INTRAVENOUS; SUBCUTANEOUS
Status: COMPLETED | OUTPATIENT
Start: 2017-03-14 | End: 2017-03-14

## 2017-03-14 RX ORDER — ONDANSETRON HYDROCHLORIDE 8 MG/1
8 TABLET, FILM COATED ORAL ONCE
Status: COMPLETED | OUTPATIENT
Start: 2017-03-14 | End: 2017-03-14

## 2017-03-14 RX ADMIN — AZACITIDINE 140 MG: 100 INJECTION, POWDER, LYOPHILIZED, FOR SOLUTION INTRAVENOUS; SUBCUTANEOUS at 10:03

## 2017-03-14 RX ADMIN — ONDANSETRON 8 MG: 8 TABLET, FILM COATED ORAL at 10:03

## 2017-03-14 NOTE — PLAN OF CARE
Problem: Patient Care Overview  Goal: Plan of Care Review  Outcome: Ongoing (interventions implemented as appropriate)  No complaints

## 2017-03-14 NOTE — MR AVS SNAPSHOT
Patient Information     Patient Name Sex Carlos Morales Jr. Male 1955      Visit Information        Provider Department Dept Phone Center    3/14/2017 10:00 AM [facility] [phone] [facility]      Patient Instructions      [facility]  [address]  [address] [address]  [phone] phone     [phone] fax  Hours of Operation: Monday- Friday 8:00am- 5:00pm  After hours phone  [phone]  Hematology / Oncology Physicians on call      Dr. Tristan Gardiner    Please call with any concerns regarding your appointment today.FALL PREVENTION   Falls often occur due to slipping, tripping or losing your balance. Here are ways to reduce your risk of falling again.   Was there anything that caused your fall that can be fixed, removed or replaced?   Make your home safe by keeping walkways clear of objects you may trip over.   Use non-slip pads under rugs.   Do not walk in poorly lit areas.   Do not stand on chairs or wobbly ladders.   Use caution when reaching overhead or looking upward. This position can cause a loss of balance.   Be sure your shoes fit properly, have non-slip bottoms and are in good condition.   Be cautious when going up and down stairs, curbs, and when walking on uneven sidewalks.   If your balance is poor, consider using a cane or walker.   If your fall was related to alcohol use, stop or limit alcohol intake.   If your fall was related to use of sleeping medicines, talk to your doctor about this. You may need to reduce your dosage at bedtime if you awaken during the night to go to the bathroom.   To reduce the need for nighttime bathroom trips:   Avoid drinking fluids for several hours before going to bed   Empty your bladder before going to bed   Men can keep a urinal at the bedside   © 5096-8163 [employer], [address] [place] [address], [place] [address]. All rights  reserved. This information is not intended as a substitute for professional medical care. Always follow your healthcare professional's instructions.  HOME CARE AFTER CHEMOTHERAPY   Meals   Many patients feel sick and lose their appetites during treatment. Eat small meals several times a day. Choose bland foods with little taste or smell if you have problems with nausea. Be sure to cook all food thoroughly. This kills bacteria and helps you avoid intestinal infection. Soft foods are easier to swallow and digest.   Activity   Exercise keeps you strong and keeps your heart and lungs active. Talk to your doctor about an appropriate exercise program for you.   Skin Care   To prevent a skin infection, bathe or shower once a day. Use a moisturizing soap and wash with warm water. Avoid very hot or cold water. Chemotherapy can make your skin dry . Apply moisturizing lotion to help relieve dry skin. Some drugs used in high doses can cause slight burns to appear (like sunburn). Ask for a special cream to help relieve the burn and protect your skin.   Prevent Mouth Sores   During chemotherapy, many people get mouth sores. Do the following to help prevent mouth sores or to ease discomfort.   Brush your teeth with a soft-bristle toothbrush after every meal.  Don't use dental floss if your platelet count is below 50,000. Your doctor or nurse will tell you if this is the case.  Use an oral swab or special soft toothbrush if your gums bleed during regular brushing.  Use mouthwash as directed. If you can't tolerate commercial mouthwash, use salt and baking soda to clean your mouth. Mix 1 teaspoon of salt and 1 teaspoon of baking soda into a glass of water. Swish and spit.  Call your doctor or return to this facility if you develop any of the following:   Sore throat   White patches in the mouth or throat   Fever of 100.4ºF (38ºC) or higher, or as directed by your healthcare provider  © 8696-8020 [employer], [address] [place] [address]  Road, [place] [address]. All rights reserved. This information is not intended as a substitute for professional medical care. Always follow your healthcare professional's   WAYS TO HELP PREVENT INFECTION         WASH YOUR HANDS OFTEN DURING THE DAY, ESPECIALLY BEFORE YOU EAT, AFTER USING THE BATHROOM, AND AFTER TOUCHING ANIMALS     STAY AWAY FROM PEOPLE WHO HAVE ILLNESSES YOU CAN CATCH; SUCH AS COLDS, FLU, CHICKEN POX     TRY TO AVOID CROWDS     STAY AWAY FROM CHILDREN WHO RECENTLY HAVE RECEIVED LIVE VIRUS VACCINES     MAINTAIN GOOD MOUTH CARE     DO NOT SQUEEZE OR SCRATCH PIMPLES     CLEAN CUTS & SCRAPES RIGHT AWAY AND DAILY UNTIL HEALED WITH WARM WATER, SOAP & AN ANTISEPTIC     AVOID CONTACT WITH LITTER BOXES, BIRD CAGES, & FISH TANKS     AVOID STANDING WATER, IE., BIRD BATHS, FLOWER POTS/VASES, OR HUMIDIFIERS     WEAR GLOVES WHEN GARDENING OR CLEANING UP AFTER OTHERS, ESPECIALLY BABIES & SMALL CHILDREN     DO NOT EAT RAW FISH, SEAFOOD, MEAT, OR EGGS       Your Current Medications Are     ondansetron (ZOFRAN) 8 MG tablet    valacyclovir (VALTREX) 500 MG tablet      Facility-Administered Medications     azacitidine injection 140 mg    azacitidine injection 140 mg    ondansetron tablet 8 mg      Appointments for Next Year     3/15/2017 10:00 AM INFUSION 060 MIN (60 min.) [facility] 02 [facility]    Arrive at check-in approximately 15 minutes before your scheduled appointment time. Bring all outside medical records and imaging, along with a list of your current medications and insurance card.    (off [facility]) 1st floor    3/16/2017 10:00 AM INFUSION 060 MIN (60 min.) [facility] CHAIR 03 [facility]    Arrive at check-in approximately 15 minutes before your scheduled appointment time. Bring all outside medical records and imaging, along with a list of your current medications and insurance card.    ([facility]) 1st floor    3/17/2017 10:00 AM INFUSION 060 MIN (60 min.)  [facility] CHAIR 01 [facility]    Arrive at check-in approximately 15 minutes before your scheduled appointment time. Bring all outside medical records and imaging, along with a list of your current medications and insurance card.    (off [facility]) 1st floor    3/22/2017 10:30 AM NON FASTING LAB (5 min.) [facility], [facility]    Arrive at check-in approximately 15 minutes before your scheduled appointment time. Bring all outside medical records and imaging, along with a list of your current medications and insurance card.    (off [address]) 2nd floor    3/22/2017 11:00 AM ESTABLISHED PATIENT (20 min.) [facility] - Hemotology Oncology Lavell Flor MD    Arrive at check-in approximately 15 minutes before your scheduled appointment time. Bring all outside medical records and imaging, along with a list of your current medications and insurance card.    (off [address]) 3rd Floor         Default Flowsheet Data (last 24 hours)      Amb Complex Vitals Rony        03/14/17 1001                Measurements    /67        Temp 97.7 °F (36.5 °C)        Pulse 66        Resp 18                Allergies     No Known Allergies      Medications You Received from 03/13/2017 1053 to 03/14/2017 1053        Date/Time Order Dose Route Action     03/14/2017 1048 azacitidine injection 140 mg 140 mg Subcutaneous Given     03/14/2017 1011 ondansetron tablet 8 mg 8 mg Oral Given      Current Discharge Medication List     Cannot display discharge medications since this is not an admission.

## 2017-03-14 NOTE — PATIENT INSTRUCTIONS
[facility]  [address]  [address] [address]  [phone] phone     [phone] fax  Hours of Operation: Monday- Friday 8:00am- 5:00pm  After hours phone  [phone]  Hematology / Oncology Physicians on call      Dr. Tristan Gardiner    Please call with any concerns regarding your appointment today.FALL PREVENTION   Falls often occur due to slipping, tripping or losing your balance. Here are ways to reduce your risk of falling again.   Was there anything that caused your fall that can be fixed, removed or replaced?   Make your home safe by keeping walkways clear of objects you may trip over.   Use non-slip pads under rugs.   Do not walk in poorly lit areas.   Do not stand on chairs or wobbly ladders.   Use caution when reaching overhead or looking upward. This position can cause a loss of balance.   Be sure your shoes fit properly, have non-slip bottoms and are in good condition.   Be cautious when going up and down stairs, curbs, and when walking on uneven sidewalks.   If your balance is poor, consider using a cane or walker.   If your fall was related to alcohol use, stop or limit alcohol intake.   If your fall was related to use of sleeping medicines, talk to your doctor about this. You may need to reduce your dosage at bedtime if you awaken during the night to go to the bathroom.   To reduce the need for nighttime bathroom trips:   Avoid drinking fluids for several hours before going to bed   Empty your bladder before going to bed   Men can keep a urinal at the bedside   © 1659-8495 [employer], [address] [place] [address], [place] [address]. All rights reserved. This information is not intended as a substitute for professional medical care. Always follow your healthcare professional's instructions.  HOME CARE AFTER CHEMOTHERAPY   Meals   Many patients feel sick and lose their appetites during treatment. Eat small meals several  times a day. Choose bland foods with little taste or smell if you have problems with nausea. Be sure to cook all food thoroughly. This kills bacteria and helps you avoid intestinal infection. Soft foods are easier to swallow and digest.   Activity   Exercise keeps you strong and keeps your heart and lungs active. Talk to your doctor about an appropriate exercise program for you.   Skin Care   To prevent a skin infection, bathe or shower once a day. Use a moisturizing soap and wash with warm water. Avoid very hot or cold water. Chemotherapy can make your skin dry . Apply moisturizing lotion to help relieve dry skin. Some drugs used in high doses can cause slight burns to appear (like sunburn). Ask for a special cream to help relieve the burn and protect your skin.   Prevent Mouth Sores   During chemotherapy, many people get mouth sores. Do the following to help prevent mouth sores or to ease discomfort.   Brush your teeth with a soft-bristle toothbrush after every meal.  Don't use dental floss if your platelet count is below 50,000. Your doctor or nurse will tell you if this is the case.  Use an oral swab or special soft toothbrush if your gums bleed during regular brushing.  Use mouthwash as directed. If you can't tolerate commercial mouthwash, use salt and baking soda to clean your mouth. Mix 1 teaspoon of salt and 1 teaspoon of baking soda into a glass of water. Swish and spit.  Call your doctor or return to this facility if you develop any of the following:   Sore throat   White patches in the mouth or throat   Fever of 100.4ºF (38ºC) or higher, or as directed by your healthcare provider  © 5793-4968 [employer], [address] [place] [address], [place] [address]. All rights reserved. This information is not intended as a substitute for professional medical care. Always follow your healthcare professional's   WAYS TO HELP PREVENT INFECTION         WASH YOUR HANDS OFTEN DURING THE DAY, ESPECIALLY BEFORE YOU EAT, AFTER  USING THE BATHROOM, AND AFTER TOUCHING ANIMALS     STAY AWAY FROM PEOPLE WHO HAVE ILLNESSES YOU CAN CATCH; SUCH AS COLDS, FLU, CHICKEN POX     TRY TO AVOID CROWDS     STAY AWAY FROM CHILDREN WHO RECENTLY HAVE RECEIVED LIVE VIRUS VACCINES     MAINTAIN GOOD MOUTH CARE     DO NOT SQUEEZE OR SCRATCH PIMPLES     CLEAN CUTS & SCRAPES RIGHT AWAY AND DAILY UNTIL HEALED WITH WARM WATER, SOAP & AN ANTISEPTIC     AVOID CONTACT WITH LITTER BOXES, BIRD CAGES, & FISH TANKS     AVOID STANDING WATER, IE., BIRD BATHS, FLOWER POTS/VASES, OR HUMIDIFIERS     WEAR GLOVES WHEN GARDENING OR CLEANING UP AFTER OTHERS, ESPECIALLY BABIES & SMALL CHILDREN     DO NOT EAT RAW FISH, SEAFOOD, MEAT, OR EGGS

## 2017-03-15 ENCOUNTER — INFUSION (OUTPATIENT)
Dept: INFUSION THERAPY | Facility: HOSPITAL | Age: 62
End: 2017-03-15
Attending: INTERNAL MEDICINE
Payer: MEDICARE

## 2017-03-15 VITALS
HEIGHT: 68 IN | RESPIRATION RATE: 18 BRPM | HEART RATE: 65 BPM | OXYGEN SATURATION: 98 % | TEMPERATURE: 98 F | DIASTOLIC BLOOD PRESSURE: 62 MMHG | SYSTOLIC BLOOD PRESSURE: 109 MMHG | BODY MASS INDEX: 24.56 KG/M2 | WEIGHT: 162.06 LBS

## 2017-03-15 DIAGNOSIS — C92.00 ACUTE MYELOID LEUKEMIA NOT HAVING ACHIEVED REMISSION: Primary | ICD-10-CM

## 2017-03-15 DIAGNOSIS — C92.02 ACUTE MYELOID LEUKEMIA IN RELAPSE: ICD-10-CM

## 2017-03-15 PROCEDURE — 63600175 PHARM REV CODE 636 W HCPCS: Performed by: INTERNAL MEDICINE

## 2017-03-15 PROCEDURE — 25000003 PHARM REV CODE 250: Performed by: INTERNAL MEDICINE

## 2017-03-15 PROCEDURE — 96401 CHEMO ANTI-NEOPL SQ/IM: CPT

## 2017-03-15 RX ORDER — AZACITIDINE 100 MG/1
75 INJECTION, POWDER, LYOPHILIZED, FOR SOLUTION INTRAVENOUS; SUBCUTANEOUS
Status: COMPLETED | OUTPATIENT
Start: 2017-03-15 | End: 2017-03-15

## 2017-03-15 RX ORDER — ONDANSETRON HYDROCHLORIDE 8 MG/1
8 TABLET, FILM COATED ORAL ONCE
Status: COMPLETED | OUTPATIENT
Start: 2017-03-15 | End: 2017-03-15

## 2017-03-15 RX ADMIN — ONDANSETRON 8 MG: 8 TABLET, FILM COATED ORAL at 10:03

## 2017-03-15 RX ADMIN — AZACITIDINE 140 MG: 100 INJECTION, POWDER, LYOPHILIZED, FOR SOLUTION INTRAVENOUS; SUBCUTANEOUS at 10:03

## 2017-03-15 NOTE — PLAN OF CARE
Problem: Patient Care Overview  Goal: Plan of Care Review  Outcome: Ongoing (interventions implemented as appropriate)  Pt states that he feels good today and denies any problems.

## 2017-03-15 NOTE — PATIENT INSTRUCTIONS
[facility]  [address]  [address] [address]  [phone] phone     [phone] fax  Hours of Operation: Monday- Friday 8:00am- 5:00pm  After hours phone  [phone]  Hematology / Oncology Physicians on call      Dr. Tristan Gardiner    Please call with any concerns regarding your appointment today.    FALL PREVENTION   Falls often occur due to slipping, tripping or losing your balance. Here are ways to reduce your risk of falling again.   Was there anything that caused your fall that can be fixed, removed or replaced?   Make your home safe by keeping walkways clear of objects you may trip over.   Use non-slip pads under rugs.   Do not walk in poorly lit areas.   Do not stand on chairs or wobbly ladders.   Use caution when reaching overhead or looking upward. This position can cause a loss of balance.   Be sure your shoes fit properly, have non-slip bottoms and are in good condition.   Be cautious when going up and down stairs, curbs, and when walking on uneven sidewalks.   If your balance is poor, consider using a cane or walker.   If your fall was related to alcohol use, stop or limit alcohol intake.   If your fall was related to use of sleeping medicines, talk to your doctor about this. You may need to reduce your dosage at bedtime if you awaken during the night to go to the bathroom.   To reduce the need for nighttime bathroom trips:   Avoid drinking fluids for several hours before going to bed   Empty your bladder before going to bed   Men can keep a urinal at the bedside   © 6658-8050 [employer], [address] [place] [address]. All rights reserved. This information is not intended as a substitute for professional medical care. Always follow your healthcare professional's instructions.    HOME CARE AFTER CHEMOTHERAPY   Meals   Many patients feel sick and lose their appetites during treatment. Eat small meals  several times a day. Choose bland foods with little taste or smell if you have problems with nausea. Be sure to cook all food thoroughly. This kills bacteria and helps you avoid intestinal infection. Soft foods are easier to swallow and digest.   Activity   Exercise keeps you strong and keeps your heart and lungs active. Talk to your doctor about an appropriate exercise program for you.   Skin Care   To prevent a skin infection, bathe or shower once a day. Use a moisturizing soap and wash with warm water. Avoid very hot or cold water. Chemotherapy can make your skin dry . Apply moisturizing lotion to help relieve dry skin. Some drugs used in high doses can cause slight burns to appear (like sunburn). Ask for a special cream to help relieve the burn and protect your skin.   Prevent Mouth Sores   During chemotherapy, many people get mouth sores. Do the following to help prevent mouth sores or to ease discomfort.   Brush your teeth with a soft-bristle toothbrush after every meal.  Don't use dental floss if your platelet count is below 50,000. Your doctor or nurse will tell you if this is the case.  Use an oral swab or special soft toothbrush if your gums bleed during regular brushing.  Use mouthwash as directed. If you can't tolerate commercial mouthwash, use salt and baking soda to clean your mouth. Mix 1 teaspoon of salt and 1 teaspoon of baking soda into a glass of water. Swish and spit.  Call your doctor or return to this facility if you develop any of the following:   Sore throat   White patches in the mouth or throat   Fever of 100.4ºF (38ºC) or higher, or as directed by your healthcare provider  © 9998-2054 [employer], [address] [place] [address], [place] [address]. All rights reserved. This information is not intended as a substitute for professional medical care. Always follow your healthcare professional's     YOU HAVE STARTED ON CHEMOTHERAPY. IF YOU EXPERIENCE ANY OF THE FOLLOWING PROBLEMS, CALL THE OFFICE  IMMEDIATELY.    *FEVER .0 OR GREATER    *CHILLS, ESPECIALLY SHAKING CHILLS, OR SWEATING    *A SEVERE COUGH OR SORE THROAT, OR SINUS PAIN/     PRESSURE    *REDNESS, SWELLING, OR TENDERNESS AROUND A WOUND,     SORE, PIMPLE, RECTAL AREA, OR IV SITE    *SORES OR ULCERS IN THE MOUTH    *BLISTERS ON THE LIPS OR SKIN    *FREQUENT URGENCY TO URINATE OR A BURNING FEELING   WHEN YOU URINATE    *BLOOD IN THE URINE OR STOOL    *ANY UNEXPLAINED BRUISING OR PROLONGED BLEEDING,     (NOSEBLEEDS OR BLEEDING GUMS)    *LOOSE BOWEL MOVEMENTS THAT DO NOT RESPOND TO     IMODIUM OR MORE THAN THREE TIMES A DAY    *VOMITING UNRESPONSIVE TO ANTINAUSEA MEDICINE    *ANY UNUSUAL PHYSICAL SYMPTOMS THAT BEGAN AFTER     CHEMOTHERAPY    DURING WEEKDAYS, CALL AND ASK TO SPEAK DIRECTLY TO A NURSE.  AT OTHER TIMES, CALL THE OFFICE PHONE NUMBER; THE ANSWERING SERVICE WILL CONTACT THE ON-CALL PHYSICIAN.  SOMEONE IS AVAILABLE 24 HOURS A DAY, SEVEN DAYS A WEEK.

## 2017-03-15 NOTE — MR AVS SNAPSHOT
Patient Information     Patient Name Sex Calros Morales Jr. Male 1955      Visit Information        Provider Department Dept Phone Center    3/15/2017 10:00 AM Sukumar Min I Chemo Infusion On Chemotherapy Infusion [phone] [facility]      Patient Instructions      [facility]  [address]  [address] [address]  [phone] phone     [phone] fax  Hours of Operation: Monday- Friday 8:00am- 5:00pm  After hours phone  [phone]  Hematology / Oncology Physicians on call      Dr. Tristan Gardiner    Please call with any concerns regarding your appointment today.    FALL PREVENTION   Falls often occur due to slipping, tripping or losing your balance. Here are ways to reduce your risk of falling again.   Was there anything that caused your fall that can be fixed, removed or replaced?   Make your home safe by keeping walkways clear of objects you may trip over.   Use non-slip pads under rugs.   Do not walk in poorly lit areas.   Do not stand on chairs or wobbly ladders.   Use caution when reaching overhead or looking upward. This position can cause a loss of balance.   Be sure your shoes fit properly, have non-slip bottoms and are in good condition.   Be cautious when going up and down stairs, curbs, and when walking on uneven sidewalks.   If your balance is poor, consider using a cane or walker.   If your fall was related to alcohol use, stop or limit alcohol intake.   If your fall was related to use of sleeping medicines, talk to your doctor about this. You may need to reduce your dosage at bedtime if you awaken during the night to go to the bathroom.   To reduce the need for nighttime bathroom trips:   Avoid drinking fluids for several hours before going to bed   Empty your bladder before going to bed   Men can keep a urinal at the bedside   © 4531-4748 [employer], [address] [place] [address], [place] [address]. All rights  reserved. This information is not intended as a substitute for professional medical care. Always follow your healthcare professional's instructions.    HOME CARE AFTER CHEMOTHERAPY   Meals   Many patients feel sick and lose their appetites during treatment. Eat small meals several times a day. Choose bland foods with little taste or smell if you have problems with nausea. Be sure to cook all food thoroughly. This kills bacteria and helps you avoid intestinal infection. Soft foods are easier to swallow and digest.   Activity   Exercise keeps you strong and keeps your heart and lungs active. Talk to your doctor about an appropriate exercise program for you.   Skin Care   To prevent a skin infection, bathe or shower once a day. Use a moisturizing soap and wash with warm water. Avoid very hot or cold water. Chemotherapy can make your skin dry . Apply moisturizing lotion to help relieve dry skin. Some drugs used in high doses can cause slight burns to appear (like sunburn). Ask for a special cream to help relieve the burn and protect your skin.   Prevent Mouth Sores   During chemotherapy, many people get mouth sores. Do the following to help prevent mouth sores or to ease discomfort.   Brush your teeth with a soft-bristle toothbrush after every meal.  Don't use dental floss if your platelet count is below 50,000. Your doctor or nurse will tell you if this is the case.  Use an oral swab or special soft toothbrush if your gums bleed during regular brushing.  Use mouthwash as directed. If you can't tolerate commercial mouthwash, use salt and baking soda to clean your mouth. Mix 1 teaspoon of salt and 1 teaspoon of baking soda into a glass of water. Swish and spit.  Call your doctor or return to this facility if you develop any of the following:   Sore throat   White patches in the mouth or throat   Fever of 100.4ºF (38ºC) or higher, or as directed by your healthcare provider  © 9004-6339 [employer], [address] [place] [address]  Road, [place] [address]. All rights reserved. This information is not intended as a substitute for professional medical care. Always follow your healthcare professional's     YOU HAVE STARTED ON CHEMOTHERAPY. IF YOU EXPERIENCE ANY OF THE FOLLOWING PROBLEMS, CALL THE OFFICE IMMEDIATELY.    *FEVER .0 OR GREATER    *CHILLS, ESPECIALLY SHAKING CHILLS, OR SWEATING    *A SEVERE COUGH OR SORE THROAT, OR SINUS PAIN/     PRESSURE    *REDNESS, SWELLING, OR TENDERNESS AROUND A WOUND,     SORE, PIMPLE, RECTAL AREA, OR IV SITE    *SORES OR ULCERS IN THE MOUTH    *BLISTERS ON THE LIPS OR SKIN    *FREQUENT URGENCY TO URINATE OR A BURNING FEELING   WHEN YOU URINATE    *BLOOD IN THE URINE OR STOOL    *ANY UNEXPLAINED BRUISING OR PROLONGED BLEEDING,     (NOSEBLEEDS OR BLEEDING GUMS)    *LOOSE BOWEL MOVEMENTS THAT DO NOT RESPOND TO     IMODIUM OR MORE THAN THREE TIMES A DAY    *VOMITING UNRESPONSIVE TO ANTINAUSEA MEDICINE    *ANY UNUSUAL PHYSICAL SYMPTOMS THAT BEGAN AFTER     CHEMOTHERAPY    DURING WEEKDAYS, CALL AND ASK TO SPEAK DIRECTLY TO A NURSE.  AT OTHER TIMES, CALL THE OFFICE PHONE NUMBER; THE ANSWERING SERVICE WILL CONTACT THE ON-CALL PHYSICIAN.  SOMEONE IS AVAILABLE 24 HOURS A DAY, SEVEN DAYS A WEEK.       Your Current Medications Are     ondansetron (ZOFRAN) 8 MG tablet    valacyclovir (VALTREX) 500 MG tablet      Facility-Administered Medications     azacitidine injection 140 mg    ondansetron tablet 8 mg      Appointments for Next Year     3/16/2017 10:00 AM INFUSION 060 MIN (60 min.) [facility] 03 ON[facility]    Arrive at check-in approximately 15 minutes before your scheduled appointment time. Bring all outside medical records and imaging, along with a list of your current medications and insurance card.    (off [facility]) 1st floor    3/17/2017 10:00 AM INFUSION 060 MIN (60 min.) [facility] CHAIR 01 ONLH    Arrive at check-in approximately 15 minutes before your scheduled  "appointment time. Bring all outside medical records and imaging, along with a list of your current medications and insurance card.    (off [facility]) 1st floor    3/22/2017 10:30 AM NON FASTING LAB (5 min.) [facility] LABORATORY, [facility]    Arrive at check-in approximately 15 minutes before your scheduled appointment time. Bring all outside medical records and imaging, along with a list of your current medications and insurance card.    (off [address]) 2nd floor    3/22/2017 11:00 AM ESTABLISHED PATIENT (20 min.) [facility] Hemotology Oncology Lavell Flor MD    Arrive at check-in approximately 15 minutes before your scheduled appointment time. Bring all outside medical records and imaging, along with a list of your current medications and insurance card.    (off [address]) 3rd Floor         Default Flowsheet Data (last 24 hours)      Amb Complex Vitals Rony        03/15/17 1001                Measurements    Weight 73.5 kg (162 lb 0.6 oz)        Height 5' 8" (1.727 m)        BSA (Calculated - sq m) 1.88 sq meters        BMI (Calculated) 24.7        /62        Temp 97.8 °F (36.6 °C)        Pulse 65        Resp 18        SpO2 98 %        Pain Assessment    Pain Score Zero                Allergies     No Known Allergies      Medications You Received from 03/14/2017 1106 to 03/15/2017 1106        Date/Time Order Dose Route Action     03/15/2017 1058 azacitidine injection 140 mg 140 mg Subcutaneous Given     03/15/2017 1016 ondansetron tablet 8 mg 8 mg Oral Given      Current Discharge Medication List     Cannot display discharge medications since this is not an admission.      "

## 2017-03-16 ENCOUNTER — INFUSION (OUTPATIENT)
Dept: INFUSION THERAPY | Facility: HOSPITAL | Age: 62
End: 2017-03-16
Attending: INTERNAL MEDICINE
Payer: MEDICARE

## 2017-03-16 VITALS
RESPIRATION RATE: 20 BRPM | WEIGHT: 162.06 LBS | DIASTOLIC BLOOD PRESSURE: 71 MMHG | TEMPERATURE: 98 F | BODY MASS INDEX: 24.56 KG/M2 | HEIGHT: 68 IN | HEART RATE: 69 BPM | SYSTOLIC BLOOD PRESSURE: 117 MMHG

## 2017-03-16 DIAGNOSIS — C92.02 ACUTE MYELOID LEUKEMIA IN RELAPSE: ICD-10-CM

## 2017-03-16 DIAGNOSIS — C92.00 ACUTE MYELOID LEUKEMIA NOT HAVING ACHIEVED REMISSION: Primary | ICD-10-CM

## 2017-03-16 PROCEDURE — 63600175 PHARM REV CODE 636 W HCPCS: Performed by: INTERNAL MEDICINE

## 2017-03-16 PROCEDURE — 25000003 PHARM REV CODE 250: Performed by: INTERNAL MEDICINE

## 2017-03-16 PROCEDURE — 96401 CHEMO ANTI-NEOPL SQ/IM: CPT

## 2017-03-16 RX ORDER — AZACITIDINE 100 MG/1
75 INJECTION, POWDER, LYOPHILIZED, FOR SOLUTION INTRAVENOUS; SUBCUTANEOUS
Status: COMPLETED | OUTPATIENT
Start: 2017-03-16 | End: 2017-03-16

## 2017-03-16 RX ORDER — ONDANSETRON HYDROCHLORIDE 8 MG/1
8 TABLET, FILM COATED ORAL ONCE
Status: COMPLETED | OUTPATIENT
Start: 2017-03-16 | End: 2017-03-16

## 2017-03-16 RX ADMIN — ONDANSETRON 8 MG: 8 TABLET, FILM COATED ORAL at 09:03

## 2017-03-16 RX ADMIN — AZACITIDINE 140 MG: 100 INJECTION, POWDER, LYOPHILIZED, FOR SOLUTION INTRAVENOUS; SUBCUTANEOUS at 10:03

## 2017-03-16 NOTE — PATIENT INSTRUCTIONS
[facility]  [address]  [address] [address]  [phone] phone     [phone] fax  Hours of Operation: Monday- Friday 8:00am- 5:00pm  After hours phone  [phone]  Hematology / Oncology Physicians on call      Dr. Tristan Gardiner    Please call with any concerns regarding your appointment today.

## 2017-03-16 NOTE — MR AVS SNAPSHOT
Patient Information     Patient Name Sex Carlos Morales Jr. Male 1955      Visit Information        Provider Department Dept Phone Center    3/16/2017 10:00 AM Patino Pako I Chemo Infusion Onl Chemotherapy Infusion [phone] [facility]      Patient Instructions      [facility]  [address]  [address] [address]  [phone] phone     [phone] fax  Hours of Operation: Monday- Friday 8:00am- 5:00pm  After hours phone  [phone]  Hematology / Oncology Physicians on call      Dr. Tristan Gardiner    Please call with any concerns regarding your appointment today.       Your Current Medications Are     ondansetron (ZOFRAN) 8 MG tablet    valacyclovir (VALTREX) 500 MG tablet      Facility-Administered Medications     azacitidine injection 140 mg    ondansetron tablet 8 mg      Appointments for Next Year     3/17/2017 10:00 AM INFUSION 060 MIN (60 min.) [facility] 01 [facility]    Arrive at check-in approximately 15 minutes before your scheduled appointment time. Bring all outside medical records and imaging, along with a list of your current medications and insurance card.    (off [facility]) 1st floor    3/22/2017 10:30 AM NON FASTING LAB (5 min.) [facility]    Arrive at check-in approximately 15 minutes before your scheduled appointment time. Bring all outside medical records and imaging, along with a list of your current medications and insurance card.    (off [facility]) 2nd floor    3/22/2017 11:00 AM ESTABLISHED PATIENT (20 min.) [facility] Hemotology Oncology Lavell Flor MD    Arrive at check-in approximately 15 minutes before your scheduled appointment time. Bring all outside medical records and imaging, along with a list of your current medications and insurance card.    (off [facility]) 3rd Floor         Default Flowsheet Data (last 24 hours)      Amb  "Complex Vitals Rony        03/16/17 0951                Measurements    Weight 73.5 kg (162 lb 0.6 oz)        Height 5' 8" (1.727 m)        BSA (Calculated - sq m) 1.88 sq meters        BMI (Calculated) 24.7        /71        Temp 97.9 °F (36.6 °C)        Pulse 69        Resp 20        Pain Assessment    Pain Score Two        Pain Loc ABDOMEN   injection sites                Allergies     No Known Allergies      Medications You Received from 03/15/2017 1110 to 03/16/2017 1110        Date/Time Order Dose Route Action     03/16/2017 1049 azacitidine injection 140 mg 140 mg Subcutaneous Given     03/16/2017 0959 ondansetron tablet 8 mg 8 mg Oral Given      Current Discharge Medication List     Cannot display discharge medications since this is not an admission.      "

## 2017-03-16 NOTE — PLAN OF CARE
Problem: Patient Care Overview  Goal: Plan of Care Review  Outcome: Ongoing (interventions implemented as appropriate)  States he is ok except his abdomen hurts from the injections.

## 2017-03-17 ENCOUNTER — INFUSION (OUTPATIENT)
Dept: INFUSION THERAPY | Facility: HOSPITAL | Age: 62
End: 2017-03-17
Attending: INTERNAL MEDICINE
Payer: MEDICARE

## 2017-03-17 VITALS — HEART RATE: 76 BPM | SYSTOLIC BLOOD PRESSURE: 127 MMHG | RESPIRATION RATE: 16 BRPM | DIASTOLIC BLOOD PRESSURE: 72 MMHG

## 2017-03-17 DIAGNOSIS — C92.00 ACUTE MYELOID LEUKEMIA NOT HAVING ACHIEVED REMISSION: Primary | ICD-10-CM

## 2017-03-17 DIAGNOSIS — C92.02 ACUTE MYELOID LEUKEMIA IN RELAPSE: ICD-10-CM

## 2017-03-17 PROCEDURE — 63600175 PHARM REV CODE 636 W HCPCS: Mod: JW | Performed by: INTERNAL MEDICINE

## 2017-03-17 PROCEDURE — 96401 CHEMO ANTI-NEOPL SQ/IM: CPT

## 2017-03-17 PROCEDURE — 25000003 PHARM REV CODE 250: Performed by: INTERNAL MEDICINE

## 2017-03-17 RX ORDER — ONDANSETRON HYDROCHLORIDE 8 MG/1
8 TABLET, FILM COATED ORAL ONCE
Status: COMPLETED | OUTPATIENT
Start: 2017-03-17 | End: 2017-03-17

## 2017-03-17 RX ORDER — AZACITIDINE 100 MG/1
75 INJECTION, POWDER, LYOPHILIZED, FOR SOLUTION INTRAVENOUS; SUBCUTANEOUS
Status: COMPLETED | OUTPATIENT
Start: 2017-03-17 | End: 2017-03-17

## 2017-03-17 RX ADMIN — ONDANSETRON 8 MG: 8 TABLET, FILM COATED ORAL at 10:03

## 2017-03-17 RX ADMIN — AZACITIDINE 140 MG: 100 INJECTION, POWDER, LYOPHILIZED, FOR SOLUTION INTRAVENOUS; SUBCUTANEOUS at 10:03

## 2017-03-17 NOTE — MR AVS SNAPSHOT
Patient Information     Patient Name Sex Carlos Morales Jr. Male 1955      Visit Information        Provider Department Dept Phone Center    3/17/2017 10:00 AM [facility] I Chemo Infusion On Chemotherapy Infusion [phone] [facility]      Patient Instructions      [facility]  [address]  [address] [address]  [phone] phone     [phone] fax  Hours of Operation: Monday- Friday 8:00am- 5:00pm  After hours phone  [phone]  Hematology / Oncology Physicians on call      Dr. Tristan Gardiner    Please call with any concerns regarding your appointment today.       Your Current Medications Are     ondansetron (ZOFRAN) 8 MG tablet    valacyclovir (VALTREX) 500 MG tablet      Facility-Administered Medications     azacitidine injection 140 mg    ondansetron tablet 8 mg      Appointments for Next Year     3/22/2017 10:30 AM NON FASTING LAB (5 min.) [facility]    Arrive at check-in approximately 15 minutes before your scheduled appointment time. Bring all outside medical records and imaging, along with a list of your current medications and insurance card.    (off [facility]) 2nd floor    3/22/2017 11:00 AM ESTABLISHED PATIENT (20 min.) [facility] - Hemotology Oncology Lavell Flor MD    Arrive at check-in approximately 15 minutes before your scheduled appointment time. Bring all outside medical records and imaging, along with a list of your current medications and insurance card.    (off [facility]) 3rd Floor         Default Flowsheet Data (last 24 hours)      Amb Complex Vitals Rony        17 1002                Measurements    /72        Pulse 76        Resp 16        Pain Assessment    Pain Score Three        Pain Loc ABDOMEN   injection sites; jono. to left lower quad                 Allergies     No Known Allergies      Medications You Received from 2017  1107 to 03/17/2017 1107        Date/Time Order Dose Route Action     03/17/2017 1054 azacitidine injection 140 mg 140 mg Subcutaneous Given     03/17/2017 1013 ondansetron tablet 8 mg 8 mg Oral Given      Current Discharge Medication List     Cannot display discharge medications since this is not an admission.

## 2017-03-17 NOTE — PATIENT INSTRUCTIONS
[facility]  [address]  [address] [address]  [phone] phone     [phone] fax  Hours of Operation: Monday- Friday 8:00am- 5:00pm  After hours phone  [phone]  Hematology / Oncology Physicians on call      Dr. Tristan Gardiner    Please call with any concerns regarding your appointment today.

## 2017-03-17 NOTE — PLAN OF CARE
Problem: Patient Care Overview  Goal: Plan of Care Review  Outcome: Ongoing (interventions implemented as appropriate)  States abdomen continues to be painful, 3 out of 10 on pain scale.

## 2017-03-22 ENCOUNTER — OFFICE VISIT (OUTPATIENT)
Dept: HEMATOLOGY/ONCOLOGY | Facility: CLINIC | Age: 62
End: 2017-03-22
Payer: MEDICARE

## 2017-03-22 ENCOUNTER — LAB VISIT (OUTPATIENT)
Dept: LAB | Facility: HOSPITAL | Age: 62
End: 2017-03-22
Attending: INTERNAL MEDICINE
Payer: MEDICARE

## 2017-03-22 VITALS
RESPIRATION RATE: 18 BRPM | SYSTOLIC BLOOD PRESSURE: 128 MMHG | OXYGEN SATURATION: 99 % | DIASTOLIC BLOOD PRESSURE: 66 MMHG | HEART RATE: 84 BPM | WEIGHT: 162.69 LBS | HEIGHT: 68 IN | TEMPERATURE: 98 F | BODY MASS INDEX: 24.66 KG/M2

## 2017-03-22 DIAGNOSIS — C92.00 ACUTE MYELOID LEUKEMIA NOT HAVING ACHIEVED REMISSION: Primary | ICD-10-CM

## 2017-03-22 DIAGNOSIS — C92.00 ACUTE MYELOID LEUKEMIA NOT HAVING ACHIEVED REMISSION: ICD-10-CM

## 2017-03-22 LAB
BASOPHILS # BLD AUTO: ABNORMAL K/UL
BASOPHILS NFR BLD: 0 %
DIFFERENTIAL METHOD: ABNORMAL
EOSINOPHIL # BLD AUTO: ABNORMAL K/UL
EOSINOPHIL NFR BLD: 8 %
ERYTHROCYTE [DISTWIDTH] IN BLOOD BY AUTOMATED COUNT: 13.1 %
HCT VFR BLD AUTO: 31.3 %
HGB BLD-MCNC: 10.6 G/DL
LYMPHOCYTES # BLD AUTO: ABNORMAL K/UL
LYMPHOCYTES NFR BLD: 70 %
MCH RBC QN AUTO: 38.4 PG
MCHC RBC AUTO-ENTMCNC: 33.9 %
MCV RBC AUTO: 113 FL
MONOCYTES # BLD AUTO: ABNORMAL K/UL
MONOCYTES NFR BLD: 14 %
NEUTROPHILS NFR BLD: 8 %
PLATELET # BLD AUTO: 34 K/UL
PLATELET BLD QL SMEAR: ABNORMAL
PMV BLD AUTO: 10.5 FL
RBC # BLD AUTO: 2.76 M/UL
WBC # BLD AUTO: 1.7 K/UL

## 2017-03-22 PROCEDURE — 99999 PR PBB SHADOW E&M-EST. PATIENT-LVL III: CPT | Mod: PBBFAC,,, | Performed by: INTERNAL MEDICINE

## 2017-03-22 PROCEDURE — 1160F RVW MEDS BY RX/DR IN RCRD: CPT | Mod: S$GLB,,, | Performed by: INTERNAL MEDICINE

## 2017-03-22 PROCEDURE — 85027 COMPLETE CBC AUTOMATED: CPT | Mod: PO

## 2017-03-22 PROCEDURE — 99214 OFFICE O/P EST MOD 30 MIN: CPT | Mod: S$GLB,,, | Performed by: INTERNAL MEDICINE

## 2017-03-22 PROCEDURE — 36415 COLL VENOUS BLD VENIPUNCTURE: CPT | Mod: PO

## 2017-03-22 PROCEDURE — 85007 BL SMEAR W/DIFF WBC COUNT: CPT | Mod: PO

## 2017-03-22 NOTE — PROGRESS NOTES
Subjective:       Patient ID: Carlos Jordan Jr. is a 62 y.o. male.    Chief Complaint: No chief complaint on file.    HPI This is a 62 year-old gentleman who comes for follow up of his AML.  He was found to have pancytopenia during a routine cbc done at the ER because of other complains.  A bone marrow was done on 11/4/2016. 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  He said he was not interested in discussing a stem cell transplant .  He said he would prefer to be treated with a low toxicity agent like Vidaza which would allow him to be treated as an outpatient.  He came in for C4  d10 of VIDAZA    ALLERGIES: None.      MEDICATIONS: None.      PREVIOUS SURGERIES: Amputation of the tip of the right middle finger.      SOCIAL HISTORY: , has one child. He lives in [place]. He smokes half a  pack a day and has been doing this for 35 years. Denies significant drinking.   He is retired.      FAMILY HISTORY: Brother had brain tumor. Father had diabetes. No heart   attacks.      PAST MEDICAL HISTORY:  1. AML  2. Tobacco use.    3-thrombocytopenia  Review of Systems   Constitutional: Negative.  Negative for fatigue.   Eyes: Negative.    Cardiovascular: Negative.  Negative for chest pain.   Gastrointestinal: Negative for abdominal pain and nausea.   Genitourinary: Negative.  Negative for hematuria.   Musculoskeletal: Negative.    Skin: Negative.    Neurological: Negative.    Psychiatric/Behavioral: Negative.        Objective:      Physical Exam   Constitutional: He is oriented to person, place, and time. He appears well-developed and well-nourished.   HENT:   Head: Normocephalic.   Mouth/Throat: No oropharyngeal exudate.   Eyes: Pupils are equal, round, and reactive to light.   Neck: No thyromegaly  present.   Cardiovascular: Normal rate, regular rhythm and normal heart sounds.  Exam reveals no gallop.    No murmur heard.  Pulmonary/Chest: No respiratory distress. He has no wheezes. He has no rales.   Abdominal: Soft. Bowel sounds are normal. He exhibits no distension and no mass. There is no rebound and no guarding.   Musculoskeletal: Normal range of motion. He exhibits no edema.   Lymphadenopathy:     He has no cervical adenopathy.   Neurological: He is alert and oriented to person, place, and time.   Skin: Skin is warm and dry.   Psychiatric: He has a normal mood and affect. His behavior is normal.       Wt Readings from Last 3 Encounters:   03/22/17 73.8 kg (162 lb 11.2 oz)   03/16/17 73.5 kg (162 lb 0.6 oz)   03/15/17 73.5 kg (162 lb 0.6 oz)     Temp Readings from Last 3 Encounters:   03/22/17 98.3 °F (36.8 °C) (Oral)   03/16/17 97.9 °F (36.6 °C)   03/15/17 97.8 °F (36.6 °C)     BP Readings from Last 3 Encounters:   03/22/17 128/66   03/17/17 127/72   03/16/17 117/71     Pulse Readings from Last 3 Encounters:   03/22/17 84   03/17/17 76   03/16/17 69       Assessment:       1. Acute myeloid leukemia not having achieved remission        Plan:       Lab Results   Component Value Date    WBC 1.70 (LL) 03/22/2017    HGB 10.6 (L) 03/22/2017    HCT 31.3 (L) 03/22/2017     (H) 03/22/2017    PLT 34 (LL) 03/22/2017     Counts stable for d10.  See me in 8 days with a cbc

## 2017-03-30 ENCOUNTER — OFFICE VISIT (OUTPATIENT)
Dept: HEMATOLOGY/ONCOLOGY | Facility: CLINIC | Age: 62
End: 2017-03-30
Payer: MEDICARE

## 2017-03-30 ENCOUNTER — TELEPHONE (OUTPATIENT)
Dept: HEMATOLOGY/ONCOLOGY | Facility: CLINIC | Age: 62
End: 2017-03-30

## 2017-03-30 VITALS
OXYGEN SATURATION: 99 % | BODY MASS INDEX: 24.35 KG/M2 | DIASTOLIC BLOOD PRESSURE: 80 MMHG | RESPIRATION RATE: 18 BRPM | WEIGHT: 160.69 LBS | SYSTOLIC BLOOD PRESSURE: 120 MMHG | HEIGHT: 68 IN | TEMPERATURE: 99 F | HEART RATE: 76 BPM

## 2017-03-30 DIAGNOSIS — C92.00 ACUTE MYELOID LEUKEMIA NOT HAVING ACHIEVED REMISSION: Primary | ICD-10-CM

## 2017-03-30 DIAGNOSIS — D69.6 THROMBOCYTOPENIA: ICD-10-CM

## 2017-03-30 PROCEDURE — 99999 PR PBB SHADOW E&M-EST. PATIENT-LVL III: CPT | Mod: PBBFAC,,, | Performed by: INTERNAL MEDICINE

## 2017-03-30 PROCEDURE — 1160F RVW MEDS BY RX/DR IN RCRD: CPT | Mod: S$GLB,,, | Performed by: INTERNAL MEDICINE

## 2017-03-30 PROCEDURE — 99214 OFFICE O/P EST MOD 30 MIN: CPT | Mod: S$GLB,,, | Performed by: INTERNAL MEDICINE

## 2017-03-30 NOTE — PROGRESS NOTES
Subjective:       Patient ID: Carlos Jordan Jr. is a 62 y.o. male.    Chief Complaint: Follow-up    HPI This is a 62 year-old gentleman who comes for follow up of his AML.  He was found to have pancytopenia during a routine cbc done at the ER because of other complains.  A bone marrow was done on 11/4/2016. 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  He said he was not interested in discussing a stem cell transplant .  He said he would prefer to be treated with a low toxicity agent like Vidaza which would allow him to be treated as an outpatient.  He came in for C4  d17of VIDAZA  ALLERGIES: None.      MEDICATIONS: None.      PREVIOUS SURGERIES: Amputation of the tip of the right middle finger.      SOCIAL HISTORY: , has one child. He lives in [place]. He smokes half a  pack a day and has been doing this for 35 years. Denies significant drinking.   He is retired.      FAMILY HISTORY: Brother had brain tumor. Father had diabetes. No heart   attacks.      PAST MEDICAL HISTORY:  1. AML  2. Tobacco use.    3-thrombocytopenia  Review of Systems   Constitutional: Positive for unexpected weight change. Negative for appetite change and fatigue.   Eyes: Negative.  Negative for visual disturbance.   Respiratory: Negative for cough and shortness of breath.    Cardiovascular: Negative.  Negative for chest pain.   Gastrointestinal: Negative for abdominal pain, diarrhea and nausea.   Genitourinary: Negative.  Negative for hematuria.   Musculoskeletal: Negative.    Skin: Negative.  Negative for rash.   Neurological: Negative.  Negative for headaches.   Hematological: Negative for adenopathy.   Psychiatric/Behavioral: Negative.  The patient is not nervous/anxious.        Wt Readings from Last 3 Encounters:    03/30/17 72.9 kg (160 lb 11.5 oz)   03/22/17 73.8 kg (162 lb 11.2 oz)   03/16/17 73.5 kg (162 lb 0.6 oz)     Temp Readings from Last 3 Encounters:   03/30/17 99 °F (37.2 °C) (Oral)   03/22/17 98.3 °F (36.8 °C) (Oral)   03/16/17 97.9 °F (36.6 °C)     BP Readings from Last 3 Encounters:   03/30/17 120/80   03/22/17 128/66   03/17/17 127/72     Pulse Readings from Last 3 Encounters:   03/30/17 76   03/22/17 84   03/17/17 76       Objective:      Physical Exam   Constitutional: He is oriented to person, place, and time. He appears well-developed and well-nourished.   HENT:   Head: Normocephalic.   Mouth/Throat: No oropharyngeal exudate.   Eyes: Pupils are equal, round, and reactive to light.   Neck: No thyromegaly present.   Cardiovascular: Normal rate and normal heart sounds.  Exam reveals no gallop.    No murmur heard.  Heart rhythm is regular with occasional premature beats. EKG in the recent past showed NSR with some PVCs   Pulmonary/Chest: No respiratory distress. He has no wheezes. He has no rales.   Abdominal: Soft. Bowel sounds are normal. He exhibits no distension and no mass. There is no rebound and no guarding.   Musculoskeletal: Normal range of motion. He exhibits no edema.   Lymphadenopathy:     He has no cervical adenopathy.   Neurological: He is alert and oriented to person, place, and time.   Skin: Skin is warm and dry.   Psychiatric: He has a normal mood and affect. His behavior is normal.       Wt Readings from Last 3 Encounters:   03/30/17 72.9 kg (160 lb 11.5 oz)   03/22/17 73.8 kg (162 lb 11.2 oz)   03/16/17 73.5 kg (162 lb 0.6 oz)     Temp Readings from Last 3 Encounters:   03/30/17 99 °F (37.2 °C) (Oral)   03/22/17 98.3 °F (36.8 °C) (Oral)   03/16/17 97.9 °F (36.6 °C)     BP Readings from Last 3 Encounters:   03/30/17 120/80   03/22/17 128/66   03/17/17 127/72     Pulse Readings from Last 3 Encounters:   03/30/17 76   03/22/17 84   03/17/17 76       Assessment:       1. Acute myeloid leukemia  not having achieved remission        Plan:       Lab Results   Component Value Date    WBC 1.36 (LL) 03/30/2017    HGB 10.9 (L) 03/30/2017    HCT 32.1 (L) 03/30/2017     (H) 03/30/2017    PLT 95 (L) 03/30/2017        see me on Monday April 10 with a cbc. He will not be able to start treatment that week because it being a short week due to Good Friday.  Plan is for him to start C5 on Monday April aPRIL 17

## 2017-04-10 ENCOUNTER — OFFICE VISIT (OUTPATIENT)
Dept: HEMATOLOGY/ONCOLOGY | Facility: CLINIC | Age: 62
End: 2017-04-10
Payer: MEDICARE

## 2017-04-10 ENCOUNTER — LAB VISIT (OUTPATIENT)
Dept: LAB | Facility: HOSPITAL | Age: 62
End: 2017-04-10
Attending: INTERNAL MEDICINE
Payer: MEDICARE

## 2017-04-10 VITALS
DIASTOLIC BLOOD PRESSURE: 82 MMHG | HEIGHT: 68 IN | HEART RATE: 75 BPM | OXYGEN SATURATION: 95 % | WEIGHT: 161.19 LBS | TEMPERATURE: 99 F | BODY MASS INDEX: 24.43 KG/M2 | RESPIRATION RATE: 18 BRPM | SYSTOLIC BLOOD PRESSURE: 128 MMHG

## 2017-04-10 DIAGNOSIS — C92.00 ACUTE MYELOID LEUKEMIA NOT HAVING ACHIEVED REMISSION: ICD-10-CM

## 2017-04-10 DIAGNOSIS — C92.00 ACUTE MYELOID LEUKEMIA NOT HAVING ACHIEVED REMISSION: Primary | ICD-10-CM

## 2017-04-10 LAB
ANISOCYTOSIS BLD QL SMEAR: ABNORMAL
BASOPHILS # BLD AUTO: 0.01 K/UL
BASOPHILS NFR BLD: 0.6 %
DIFFERENTIAL METHOD: ABNORMAL
EOSINOPHIL # BLD AUTO: 0 K/UL
EOSINOPHIL NFR BLD: 1.8 %
ERYTHROCYTE [DISTWIDTH] IN BLOOD BY AUTOMATED COUNT: 12.9 %
HCT VFR BLD AUTO: 33.2 %
HGB BLD-MCNC: 11.3 G/DL
LYMPHOCYTES # BLD AUTO: 1 K/UL
LYMPHOCYTES NFR BLD: 62.4 %
MCH RBC QN AUTO: 38.3 PG
MCHC RBC AUTO-ENTMCNC: 34 %
MCV RBC AUTO: 113 FL
MONOCYTES # BLD AUTO: 0.4 K/UL
MONOCYTES NFR BLD: 26.1 %
NEUTROPHILS # BLD AUTO: 0.2 K/UL
NEUTROPHILS NFR BLD: 9.1 %
PLATELET # BLD AUTO: 126 K/UL
PLATELET BLD QL SMEAR: ABNORMAL
PMV BLD AUTO: 9.1 FL
POLYCHROMASIA BLD QL SMEAR: ABNORMAL
RBC # BLD AUTO: 2.95 M/UL
WBC # BLD AUTO: 1.65 K/UL

## 2017-04-10 PROCEDURE — 99999 PR PBB SHADOW E&M-EST. PATIENT-LVL III: CPT | Mod: PBBFAC,,, | Performed by: INTERNAL MEDICINE

## 2017-04-10 PROCEDURE — 36415 COLL VENOUS BLD VENIPUNCTURE: CPT

## 2017-04-10 PROCEDURE — 99214 OFFICE O/P EST MOD 30 MIN: CPT | Mod: S$GLB,,, | Performed by: INTERNAL MEDICINE

## 2017-04-10 PROCEDURE — 85025 COMPLETE CBC W/AUTO DIFF WBC: CPT

## 2017-04-10 PROCEDURE — 1160F RVW MEDS BY RX/DR IN RCRD: CPT | Mod: S$GLB,,, | Performed by: INTERNAL MEDICINE

## 2017-04-10 PROCEDURE — 85027 COMPLETE CBC AUTOMATED: CPT

## 2017-04-10 NOTE — PROGRESS NOTES
Subjective:       Patient ID: Carlos Jordan Jr. is a 62 y.o. male.    Chief Complaint: Follow-up  This is a 62 year-old gentleman who comes for follow up of his AML.  He was found to have pancytopenia during a routine cbc done at the ER because of other complains.  A bone marrow was done on 11/4/2016. 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  He said he was not interested in discussing a stem cell transplant .  He said he would prefer to be treated with a low toxicity agent like Vidaza which would allow him to be treated as an outpatient.  He came in for C4  d28of VIDAZA  He has been having some pain in the shoulders.  ALLERGIES: None.      MEDICATIONS: None.      PREVIOUS SURGERIES: Amputation of the tip of the right middle finger.      SOCIAL HISTORY: , has one child. He lives in [place]. He smokes half a  pack a day and has been doing this for 35 years. Denies significant drinking.   He is retired.      FAMILY HISTORY: Brother had brain tumor. Father had diabetes. No heart   attacks.      PAST MEDICAL HISTORY:  1. AML  2. Tobacco use.    3-thrombocytopenia  HPI  Review of Systems   Constitutional: Negative.  Negative for fatigue.   Eyes: Negative.    Cardiovascular: Negative.  Negative for chest pain.   Gastrointestinal: Negative for abdominal pain and nausea.   Genitourinary: Negative.  Negative for hematuria.   Musculoskeletal: Negative.    Skin: Negative.    Neurological: Negative.    Psychiatric/Behavioral: Negative.        Objective:      Physical Exam   Constitutional: He is oriented to person, place, and time. He appears well-developed and well-nourished.   HENT:   Head: Normocephalic.   Mouth/Throat: No oropharyngeal exudate.   Eyes: Pupils are equal, round, and reactive to light.    Neck: No thyromegaly present.   Cardiovascular: Normal rate, regular rhythm and normal heart sounds.  Exam reveals no gallop.    No murmur heard.  Pulmonary/Chest: No respiratory distress. He has no wheezes. He has no rales.   Abdominal: Soft. Bowel sounds are normal. He exhibits no distension and no mass. There is no rebound and no guarding.   Musculoskeletal: Normal range of motion. He exhibits no edema.   Lymphadenopathy:     He has no cervical adenopathy.   Neurological: He is alert and oriented to person, place, and time.   Skin: Skin is warm and dry.   Psychiatric: He has a normal mood and affect. His behavior is normal.       Wt Readings from Last 3 Encounters:   04/10/17 73.1 kg (161 lb 2.5 oz)   03/30/17 72.9 kg (160 lb 11.5 oz)   03/22/17 73.8 kg (162 lb 11.2 oz)     Temp Readings from Last 3 Encounters:   04/10/17 98.6 °F (37 °C) (Oral)   03/30/17 99 °F (37.2 °C) (Oral)   03/22/17 98.3 °F (36.8 °C) (Oral)     BP Readings from Last 3 Encounters:   04/10/17 128/82   03/30/17 120/80   03/22/17 128/66     Pulse Readings from Last 3 Encounters:   04/10/17 75   03/30/17 76   03/22/17 84       Assessment:       1. Acute myeloid leukemia not having achieved remission        Plan:       Lab Results   Component Value Date    WBC 1.65 (LL) 04/10/2017    HGB 11.3 (L) 04/10/2017    HCT 33.2 (L) 04/10/2017     (H) 04/10/2017     (L) 04/10/2017       Plateelt count is low as expected.  He would have been able to get treatment today, but since Vidaza is a 5 day treatment  and this Friday is a holiday ( Good Friday), we wll hold treatment for a week.  See me in 7 days with a cbc.  Tylenol for shoulder pain

## 2017-04-17 ENCOUNTER — OFFICE VISIT (OUTPATIENT)
Dept: HEMATOLOGY/ONCOLOGY | Facility: CLINIC | Age: 62
End: 2017-04-17
Payer: MEDICARE

## 2017-04-17 ENCOUNTER — INFUSION (OUTPATIENT)
Dept: INFUSION THERAPY | Facility: HOSPITAL | Age: 62
End: 2017-04-17
Attending: INTERNAL MEDICINE
Payer: MEDICARE

## 2017-04-17 VITALS
WEIGHT: 162.25 LBS | OXYGEN SATURATION: 98 % | SYSTOLIC BLOOD PRESSURE: 122 MMHG | BODY MASS INDEX: 24.59 KG/M2 | HEART RATE: 82 BPM | HEIGHT: 68 IN | TEMPERATURE: 98 F | DIASTOLIC BLOOD PRESSURE: 79 MMHG

## 2017-04-17 VITALS — BODY MASS INDEX: 24.59 KG/M2 | HEIGHT: 68 IN | WEIGHT: 162.25 LBS

## 2017-04-17 DIAGNOSIS — T45.1X5A ANEMIA DUE TO CHEMOTHERAPY: ICD-10-CM

## 2017-04-17 DIAGNOSIS — D64.81 ANEMIA DUE TO CHEMOTHERAPY: ICD-10-CM

## 2017-04-17 DIAGNOSIS — C92.00 ACUTE MYELOID LEUKEMIA NOT HAVING ACHIEVED REMISSION: Primary | ICD-10-CM

## 2017-04-17 DIAGNOSIS — C92.02 ACUTE MYELOID LEUKEMIA IN RELAPSE: Primary | ICD-10-CM

## 2017-04-17 DIAGNOSIS — C92.02 ACUTE MYELOID LEUKEMIA IN RELAPSE: ICD-10-CM

## 2017-04-17 PROCEDURE — 99215 OFFICE O/P EST HI 40 MIN: CPT | Mod: 25,S$GLB,, | Performed by: INTERNAL MEDICINE

## 2017-04-17 PROCEDURE — 1160F RVW MEDS BY RX/DR IN RCRD: CPT | Mod: S$GLB,,, | Performed by: INTERNAL MEDICINE

## 2017-04-17 PROCEDURE — 25000003 PHARM REV CODE 250: Mod: PO | Performed by: INTERNAL MEDICINE

## 2017-04-17 PROCEDURE — 96401 CHEMO ANTI-NEOPL SQ/IM: CPT | Mod: PO

## 2017-04-17 PROCEDURE — 99999 PR PBB SHADOW E&M-EST. PATIENT-LVL III: CPT | Mod: PBBFAC,,, | Performed by: INTERNAL MEDICINE

## 2017-04-17 PROCEDURE — 63600175 PHARM REV CODE 636 W HCPCS: Mod: JW,PO | Performed by: INTERNAL MEDICINE

## 2017-04-17 RX ORDER — AZACITIDINE 100 MG/1
75 INJECTION, POWDER, LYOPHILIZED, FOR SOLUTION INTRAVENOUS; SUBCUTANEOUS
Status: COMPLETED | OUTPATIENT
Start: 2017-04-17 | End: 2017-04-17

## 2017-04-17 RX ORDER — ONDANSETRON 4 MG/1
8 TABLET, FILM COATED ORAL ONCE
Status: CANCELLED
Start: 2017-04-26 | End: 2017-04-26

## 2017-04-17 RX ORDER — ONDANSETRON 4 MG/1
8 TABLET, FILM COATED ORAL ONCE
Status: CANCELLED
Start: 2017-04-28 | End: 2017-04-28

## 2017-04-17 RX ORDER — AZACITIDINE 100 MG/1
75 INJECTION, POWDER, LYOPHILIZED, FOR SOLUTION INTRAVENOUS; SUBCUTANEOUS
Status: CANCELLED | OUTPATIENT
Start: 2017-04-28

## 2017-04-17 RX ORDER — ONDANSETRON 4 MG/1
8 TABLET, FILM COATED ORAL ONCE
Status: COMPLETED | OUTPATIENT
Start: 2017-04-17 | End: 2017-04-17

## 2017-04-17 RX ORDER — AZACITIDINE 100 MG/1
75 INJECTION, POWDER, LYOPHILIZED, FOR SOLUTION INTRAVENOUS; SUBCUTANEOUS
Status: CANCELLED | OUTPATIENT
Start: 2017-04-26

## 2017-04-17 RX ORDER — ONDANSETRON 4 MG/1
8 TABLET, FILM COATED ORAL ONCE
Status: CANCELLED
Start: 2017-04-25 | End: 2017-04-25

## 2017-04-17 RX ORDER — AZACITIDINE 100 MG/1
75 INJECTION, POWDER, LYOPHILIZED, FOR SOLUTION INTRAVENOUS; SUBCUTANEOUS
Status: CANCELLED | OUTPATIENT
Start: 2017-04-27

## 2017-04-17 RX ORDER — ONDANSETRON 4 MG/1
8 TABLET, FILM COATED ORAL ONCE
Status: CANCELLED
Start: 2017-04-24 | End: 2017-04-24

## 2017-04-17 RX ORDER — AZACITIDINE 100 MG/1
75 INJECTION, POWDER, LYOPHILIZED, FOR SOLUTION INTRAVENOUS; SUBCUTANEOUS
Status: CANCELLED | OUTPATIENT
Start: 2017-04-24

## 2017-04-17 RX ORDER — AZACITIDINE 100 MG/1
75 INJECTION, POWDER, LYOPHILIZED, FOR SOLUTION INTRAVENOUS; SUBCUTANEOUS
Status: CANCELLED | OUTPATIENT
Start: 2017-04-25

## 2017-04-17 RX ORDER — ONDANSETRON 4 MG/1
8 TABLET, FILM COATED ORAL ONCE
Status: CANCELLED
Start: 2017-04-27 | End: 2017-04-27

## 2017-04-17 RX ADMIN — ONDANSETRON 8 MG: 4 TABLET, FILM COATED ORAL at 11:04

## 2017-04-17 RX ADMIN — AZACITIDINE 140 MG: 100 INJECTION, POWDER, LYOPHILIZED, FOR SOLUTION INTRAVENOUS; SUBCUTANEOUS at 11:04

## 2017-04-17 NOTE — PLAN OF CARE
Problem: Chemotherapy Effects (Adult)  Intervention: Prevent Infection/Maximize Resistance  Infection precautions went over with pt.  Thermometer given.  He states full understanding to call if temp >100.4.

## 2017-04-17 NOTE — MR AVS SNAPSHOT
Patient Information     Patient Name Sex Carlos Morales Jr. Male 1955      Visit Information        Provider Department Dep Phone Center    2017 10:30 AM [facility] Chemo Infusion [facility] [phone] [facility]      Patient Instructions     None      Your Current Medications Are     ondansetron (ZOFRAN) 8 MG tablet    valacyclovir (VALTREX) 500 MG tablet      Facility-Administered Medications     azacitidine injection 140 mg    ondansetron tablet 8 mg      Appointments for Next Year     2017 10:30 AM INFUSION 015 MIN (15 min.) [facility] CHAIR 04 ON[facility]    Arrive at check-in approximately 15 minutes before your scheduled appointment time. Bring all outside medical records and imaging, along with a list of your current medications and insurance card.    (off [facility]) [facility] floor    2017 10:30 AM INFUSION 015 MIN (15 min.) [facility] CHAIR 04 ONLH    Arrive at check-in approximately 15 minutes before your scheduled appointment time. Bring all outside medical records and imaging, along with a list of your current medications and insurance card.    (off [facility]) [facility] floor    2017 10:30 AM INFUSION 015 MIN (15 min.) [facility] CHAIR 03 ONLH    Arrive at check-in approximately 15 minutes before your scheduled appointment time. Bring all outside medical records and imaging, along with a list of your current medications and insurance card.    (off [facility]) [facility] floor    2017 10:30 AM INFUSION 015 MIN (15 min.) [facility] CHAIR 03 ONLH    Arrive at check-in approximately 15 minutes before your scheduled appointment time. Bring all outside medical records and imaging, along with a list of your current medications and insurance card.    (off [facility]) [facility] floor    2017  9:30 AM NON FASTING LAB (10 min.) [facility] LABORATORY, [facility]    Arrive at check-in approximately 15 minutes before  "your scheduled appointment time. Bring all outside medical records and imaging, along with a list of your current medications and insurance card.    4/25/2017 10:00 AM ESTABLISHED PATIENT (20 min.) [facility] Hematology Oncology Lavell Flor MD    Arrive at check-in approximately 15 minutes before your scheduled appointment time. Bring all outside medical records and imaging, along with a list of your current medications and insurance card.    (off [facility]) 1st Floor Appointments with [facility] - 2nd Floor         Default Flowsheet Data (last 24 hours)      Amb Complex Vitals Rony        04/17/17 1111 04/17/17 1110 04/17/17 1030          Measurements    Weight 73.6 kg (162 lb 4.1 oz)  73.6 kg (162 lb 4.1 oz)      Height 5' 8" (1.727 m)  5' 8" (1.727 m)      BSA (Calculated - sq m) 1.88 sq meters  1.88 sq meters      BMI (Calculated) 24.7  24.7      BP   122/79      Temp   98 °F (36.7 °C)      Pulse   82      SpO2   98 %      Pain Assessment    Pain Score  Zero Zero              Allergies     No Known Allergies      Medications You Received from 04/16/2017 1143 to 04/17/2017 1143        Date/Time Order Dose Route Action     04/17/2017 1115 ondansetron tablet 8 mg 8 mg Oral Given      Current Discharge Medication List     Cannot display discharge medications since this is not an admission.      "

## 2017-04-17 NOTE — PROGRESS NOTES
Subjective:       Patient ID: Carlos Jordan Jr. is a 62 y.o. male.    Chief Complaint: No chief complaint on file.    HPI This is a 62 year-old gentleman who comes for follow up of his AML.  He was found to have pancytopenia during a routine cbc done at the ER because of other complains.  A bone marrow was done on 11/4/2016. 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  He said he was not interested in discussing a stem cell transplant .  He said he would prefer to be treated with a low toxicity agent like Vidaza which would allow him to be treated as an outpatient.  He came in for C5   d21 of f VIDAZA  He says she feels well.  ALLERGIES: None.      MEDICATIONS: None.      PREVIOUS SURGERIES: Amputation of the tip of the right middle finger.      SOCIAL HISTORY: , has one child. He lives in [place]. He smokes half a  pack a day and has been doing this for 35 years. Denies significant drinking.   He is retired.      FAMILY HISTORY: Brother had brain tumor. Father had diabetes. No heart   attacks.      PAST MEDICAL HISTORY:  1. AML  2. Tobacco use.    3-thrombocytopenia  Review of Systems   Constitutional: Negative.  Negative for fatigue.   Eyes: Negative.    Cardiovascular: Negative.  Negative for chest pain.   Gastrointestinal: Negative for abdominal pain and nausea.   Genitourinary: Negative.  Negative for hematuria.   Musculoskeletal: Negative.    Skin: Negative.    Neurological: Negative.    Psychiatric/Behavioral: Negative.        Objective:      Physical Exam   Constitutional: He is oriented to person, place, and time. He appears well-developed and well-nourished.   HENT:   Head: Normocephalic.   Mouth/Throat: No oropharyngeal exudate.   Eyes: Pupils are equal, round, and reactive to light.    Neck: No thyromegaly present.   Cardiovascular: Normal rate, regular rhythm and normal heart sounds.  Exam reveals no gallop.    No murmur heard.  Pulmonary/Chest: No respiratory distress. He has no wheezes. He has no rales.   Abdominal: Soft. Bowel sounds are normal. He exhibits no distension and no mass. There is no rebound and no guarding.   Musculoskeletal: Normal range of motion. He exhibits no edema.   Lymphadenopathy:     He has no cervical adenopathy.   Neurological: He is alert and oriented to person, place, and time.   Skin: Skin is warm and dry.   Psychiatric: He has a normal mood and affect. His behavior is normal.       Wt Readings from Last 3 Encounters:   04/17/17 73.6 kg (162 lb 4.1 oz)   04/10/17 73.1 kg (161 lb 2.5 oz)   03/30/17 72.9 kg (160 lb 11.5 oz)     Temp Readings from Last 3 Encounters:   04/17/17 98 °F (36.7 °C) (Oral)   04/10/17 98.6 °F (37 °C) (Oral)   03/30/17 99 °F (37.2 °C) (Oral)     BP Readings from Last 3 Encounters:   04/17/17 122/79   04/10/17 128/82   03/30/17 120/80     Pulse Readings from Last 3 Encounters:   04/17/17 82   04/10/17 75   03/30/17 76       Assessment:       1. Acute myeloid leukemia in relapse        Plan:       Lab Results   Component Value Date    WBC 1.76 (LL) 04/17/2017    HGB 12.1 (L) 04/17/2017    HCT 36.1 (L) 04/17/2017     (H) 04/17/2017    PLT 81 (L) 04/17/2017        will proceed with c5 of VIDAZA  Will monitor counts. See me in 8 days with a  Cbc  Patient aware of neutropenic precautions  Plan is for him to have a bone marrow after C6

## 2017-04-17 NOTE — PLAN OF CARE
Problem: Chemotherapy Effects (Adult)  Intervention: Monitor/Manage Fluid Balance/pH  Fluids encouraged.

## 2017-04-17 NOTE — NURSING
Vidaza Injections given without difficulties.  Bandage applied. Patient instructed to stay in the clinic for 15 minutes. Patient verbalized understanding and will notify nurse with any complaints.

## 2017-04-17 NOTE — PLAN OF CARE
Problem: Patient Care Overview  Goal: Plan of Care Review  Outcome: Ongoing (interventions implemented as appropriate)  Pt states he feels fine today.

## 2017-04-17 NOTE — PATIENT INSTRUCTIONS
[facility]  [address]  [address] [address]  [phone] phone     [phone] fax  Hours of Operation: Monday- Friday 8:00am- 5:00pm  After hours phone  [phone]  Hematology / Oncology Physicians on call      Dr. Tristan Gardiner    Please call with any concerns regarding your appointment today.    IF YOU EXPERIENCE ANY OF THE FOLLOWING PROBLEMS, CALL THE OFFICE IMMEDIATELY.    *FEVER .4 OR GREATER    *CHILLS, ESPECIALLY SHAKING CHILLS, OR SWEATING    *A SEVERE COUGH OR SORE THROAT, OR SINUS PAIN/     PRESSURE    *REDNESS, SWELLING, OR TENDERNESS AROUND A WOUND,     SORE, PIMPLE, RECTAL AREA, OR IV SITE    *SORES OR ULCERS IN THE MOUTH    *BLISTERS ON THE LIPS OR SKIN    *FREQUENT URGENCY TO URINATE OR A BURNING FEELING   WHEN YOU URINATE    *BLOOD IN THE URINE OR STOOL    *ANY UNEXPLAINED BRUISING OR PROLONGED BLEEDING,     (NOSEBLEEDS OR BLEEDING GUMS)    *LOOSE BOWEL MOVEMENTS THAT DO NOT RESPOND TO     IMODIUM OR MORE THAN THREE TIMES A DAY    *VOMITING UNRESPONSIVE TO ANTINAUSEA MEDICINE    *ANY UNUSUAL PHYSICAL SYMPTOMS THAT BEGAN AFTER     CHEMOTHERAPY    DURING WEEKDAYS, CALL AND ASK TO SPEAK DIRECTLY TO A NURSE.  AT OTHER TIMES, CALL THE OFFICE PHONE NUMBER; THE ANSWERING SERVICE WILL CONTACT THE ON-CALL PHYSICIAN.  SOMEONE IS AVAILABLE 24 HOURS A DAY, SEVEN DAYS A WEEK.

## 2017-04-18 ENCOUNTER — INFUSION (OUTPATIENT)
Dept: INFUSION THERAPY | Facility: HOSPITAL | Age: 62
End: 2017-04-18
Attending: INTERNAL MEDICINE
Payer: MEDICARE

## 2017-04-18 VITALS
HEIGHT: 68 IN | SYSTOLIC BLOOD PRESSURE: 121 MMHG | DIASTOLIC BLOOD PRESSURE: 75 MMHG | WEIGHT: 162.25 LBS | RESPIRATION RATE: 16 BRPM | BODY MASS INDEX: 24.59 KG/M2 | HEART RATE: 72 BPM | TEMPERATURE: 98 F

## 2017-04-18 DIAGNOSIS — C92.00 ACUTE MYELOID LEUKEMIA NOT HAVING ACHIEVED REMISSION: Primary | ICD-10-CM

## 2017-04-18 DIAGNOSIS — C92.02 ACUTE MYELOID LEUKEMIA IN RELAPSE: ICD-10-CM

## 2017-04-18 PROCEDURE — 25000003 PHARM REV CODE 250: Performed by: INTERNAL MEDICINE

## 2017-04-18 PROCEDURE — 96401 CHEMO ANTI-NEOPL SQ/IM: CPT

## 2017-04-18 PROCEDURE — 63600175 PHARM REV CODE 636 W HCPCS: Mod: JW | Performed by: INTERNAL MEDICINE

## 2017-04-18 RX ORDER — AZACITIDINE 100 MG/1
75 INJECTION, POWDER, LYOPHILIZED, FOR SOLUTION INTRAVENOUS; SUBCUTANEOUS
Status: COMPLETED | OUTPATIENT
Start: 2017-04-18 | End: 2017-04-18

## 2017-04-18 RX ORDER — ONDANSETRON HYDROCHLORIDE 8 MG/1
8 TABLET, FILM COATED ORAL ONCE
Status: COMPLETED | OUTPATIENT
Start: 2017-04-18 | End: 2017-04-18

## 2017-04-18 RX ADMIN — AZACITIDINE 140 MG: 100 INJECTION, POWDER, LYOPHILIZED, FOR SOLUTION INTRAVENOUS; SUBCUTANEOUS at 11:04

## 2017-04-18 RX ADMIN — ONDANSETRON 8 MG: 8 TABLET, FILM COATED ORAL at 10:04

## 2017-04-18 NOTE — NURSING
1110  4/18/17  Injection given without difficulties.Bandaid applied. Patient instructed to stay in the clinic for 15 minutes. Patient verbalized understanding and will notify nurse with any complaints.

## 2017-04-18 NOTE — PLAN OF CARE
Problem: Patient Care Overview  Goal: Plan of Care Review  Outcome: Ongoing (interventions implemented as appropriate)  Pt reports no c/o's today.
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
## 2017-04-19 ENCOUNTER — SOCIAL WORK (OUTPATIENT)
Dept: HEMATOLOGY/ONCOLOGY | Facility: CLINIC | Age: 62
End: 2017-04-19
Payer: MEDICARE

## 2017-04-19 ENCOUNTER — TELEPHONE (OUTPATIENT)
Dept: INFUSION THERAPY | Facility: HOSPITAL | Age: 62
End: 2017-04-19

## 2017-04-19 ENCOUNTER — INFUSION (OUTPATIENT)
Dept: INFUSION THERAPY | Facility: HOSPITAL | Age: 62
End: 2017-04-19
Attending: INTERNAL MEDICINE
Payer: MEDICARE

## 2017-04-19 VITALS
SYSTOLIC BLOOD PRESSURE: 148 MMHG | TEMPERATURE: 99 F | HEART RATE: 82 BPM | RESPIRATION RATE: 16 BRPM | DIASTOLIC BLOOD PRESSURE: 84 MMHG

## 2017-04-19 DIAGNOSIS — C92.00 ACUTE MYELOID LEUKEMIA NOT HAVING ACHIEVED REMISSION: Primary | ICD-10-CM

## 2017-04-19 DIAGNOSIS — C92.02 ACUTE MYELOID LEUKEMIA IN RELAPSE: ICD-10-CM

## 2017-04-19 PROCEDURE — 25000003 PHARM REV CODE 250: Performed by: INTERNAL MEDICINE

## 2017-04-19 PROCEDURE — 96401 CHEMO ANTI-NEOPL SQ/IM: CPT

## 2017-04-19 PROCEDURE — 63600175 PHARM REV CODE 636 W HCPCS: Performed by: INTERNAL MEDICINE

## 2017-04-19 RX ORDER — AZACITIDINE 100 MG/1
75 INJECTION, POWDER, LYOPHILIZED, FOR SOLUTION INTRAVENOUS; SUBCUTANEOUS
Status: CANCELLED | OUTPATIENT
Start: 2017-04-27

## 2017-04-19 RX ORDER — AZACITIDINE 100 MG/1
75 INJECTION, POWDER, LYOPHILIZED, FOR SOLUTION INTRAVENOUS; SUBCUTANEOUS
Status: CANCELLED | OUTPATIENT
Start: 2017-04-26

## 2017-04-19 RX ORDER — ONDANSETRON HYDROCHLORIDE 8 MG/1
8 TABLET, FILM COATED ORAL ONCE
Status: COMPLETED | OUTPATIENT
Start: 2017-04-19 | End: 2017-04-19

## 2017-04-19 RX ORDER — AZACITIDINE 100 MG/1
75 INJECTION, POWDER, LYOPHILIZED, FOR SOLUTION INTRAVENOUS; SUBCUTANEOUS
Status: COMPLETED | OUTPATIENT
Start: 2017-04-19 | End: 2017-04-19

## 2017-04-19 RX ADMIN — ONDANSETRON 8 MG: 8 TABLET, FILM COATED ORAL at 01:04

## 2017-04-19 RX ADMIN — AZACITIDINE 140 MG: 100 INJECTION, POWDER, LYOPHILIZED, FOR SOLUTION INTRAVENOUS; SUBCUTANEOUS at 01:04

## 2017-04-19 NOTE — PLAN OF CARE
Problem: Patient Care Overview  Goal: Plan of Care Review  Outcome: Ongoing (interventions implemented as appropriate)  Everything is the same . No complaints

## 2017-04-19 NOTE — PATIENT INSTRUCTIONS
[facility]  [address]  [address] [address]  [phone] phone     [phone] fax  Hours of Operation: Monday- Friday 8:00am- 5:00pm  After hours phone  [phone]  Hematology / Oncology Physicians on call      Dr. Tristan Gardiner    Please call with any concerns regarding your appointment today.HOME CARE AFTER CHEMOTHERAPY   Meals   Many patients feel sick and lose their appetites during treatment. Eat small meals several times a day. Choose bland foods with little taste or smell if you have problems with nausea. Be sure to cook all food thoroughly. This kills bacteria and helps you avoid intestinal infection. Soft foods are easier to swallow and digest.   Activity   Exercise keeps you strong and keeps your heart and lungs active. Talk to your doctor about an appropriate exercise program for you.   Skin Care   To prevent a skin infection, bathe or shower once a day. Use a moisturizing soap and wash with warm water. Avoid very hot or cold water. Chemotherapy can make your skin dry . Apply moisturizing lotion to help relieve dry skin. Some drugs used in high doses can cause slight burns to appear (like sunburn). Ask for a special cream to help relieve the burn and protect your skin.   Prevent Mouth Sores   During chemotherapy, many people get mouth sores. Do the following to help prevent mouth sores or to ease discomfort.   Brush your teeth with a soft-bristle toothbrush after every meal.  Don't use dental floss if your platelet count is below 50,000. Your doctor or nurse will tell you if this is the case.  Use an oral swab or special soft toothbrush if your gums bleed during regular brushing.  Use mouthwash as directed. If you can't tolerate commercial mouthwash, use salt and baking soda to clean your mouth. Mix 1 teaspoon of salt and 1 teaspoon of baking soda into a glass of water. Swish and spit.  Call your doctor or return  to this facility if you develop any of the following:   Sore throat   White patches in the mouth or throat   Fever of 100.4ºF (38ºC) or higher, or as directed by your healthcare provider  © 6321-0108 [employer], [address] [place] [address], [place] [address]. All rights reserved. This information is not intended as a substitute for professional medical care. Always follow your healthcare professional's   WAYS TO HELP PREVENT INFECTION         WASH YOUR HANDS OFTEN DURING THE DAY, ESPECIALLY BEFORE YOU EAT, AFTER USING THE BATHROOM, AND AFTER TOUCHING ANIMALS     STAY AWAY FROM PEOPLE WHO HAVE ILLNESSES YOU CAN CATCH; SUCH AS COLDS, FLU, CHICKEN POX     TRY TO AVOID CROWDS     STAY AWAY FROM CHILDREN WHO RECENTLY HAVE RECEIVED LIVE VIRUS VACCINES     MAINTAIN GOOD MOUTH CARE     DO NOT SQUEEZE OR SCRATCH PIMPLES     CLEAN CUTS & SCRAPES RIGHT AWAY AND DAILY UNTIL HEALED WITH WARM WATER, SOAP & AN ANTISEPTIC     AVOID CONTACT WITH LITTER BOXES, BIRD CAGES, & FISH TANKS     AVOID STANDING WATER, IE., BIRD BATHS, FLOWER POTS/VASES, OR HUMIDIFIERS     WEAR GLOVES WHEN GARDENING OR CLEANING UP AFTER OTHERS, ESPECIALLY BABIES & SMALL CHILDREN    DO NOT EAT RAW FISH, SEAFOOD, MEAT, OR EGGSFALL PREVENTION   Falls often occur due to slipping, tripping or losing your balance. Here are ways to reduce your risk of falling again.   Was there anything that caused your fall that can be fixed, removed or replaced?   Make your home safe by keeping walkways clear of objects you may trip over.   Use non-slip pads under rugs.   Do not walk in poorly lit areas.   Do not stand on chairs or wobbly ladders.   Use caution when reaching overhead or looking upward. This position can cause a loss of balance.   Be sure your shoes fit properly, have non-slip bottoms and are in good condition.   Be cautious when going up and down stairs, curbs, and when walking on uneven sidewalks.   If your balance is poor, consider using a cane or  walker.   If your fall was related to alcohol use, stop or limit alcohol intake.   If your fall was related to use of sleeping medicines, talk to your doctor about this. You may need to reduce your dosage at bedtime if you awaken during the night to go to the bathroom.   To reduce the need for nighttime bathroom trips:   Avoid drinking fluids for several hours before going to bed   Empty your bladder before going to bed   Men can keep a urinal at the bedside   © 8028-4301 [facility], [address] [place] [address], [place] [address]. All rights reserved. This information is not intended as a substitute for professional medical care. Always follow your healthcare professional's instructions.  

## 2017-04-19 NOTE — TELEPHONE ENCOUNTER
Call to patient to see if he was going to show up for his 1030am appt. Today.  He states he will not be coming due to the fact that he cannot afford the treatment.  He as already spoken with the  and states he still unfortunately cannot afford to come.     Will speak with dr montenegro and  and  on behalf of patient.

## 2017-04-19 NOTE — MR AVS SNAPSHOT
Patient Information     Patient Name Sex Carlos Morales Jr. Male 1955      Visit Information        Provider Department Dept Phone Center    2017 10:30 AM [facility] On Chemotherapy Infusion [phone] Sig[facility]      Patient Instructions      [facility]  [address]  [address] [address]  [phone] phone     [phone] fax  Hours of Operation: Monday- Friday 8:00am- 5:00pm  After hours phone  [phone]  Hematology / Oncology Physicians on call      Dr. Tristan Gardiner    Please call with any concerns regarding your appointment today.HOME CARE AFTER CHEMOTHERAPY   Meals   Many patients feel sick and lose their appetites during treatment. Eat small meals several times a day. Choose bland foods with little taste or smell if you have problems with nausea. Be sure to cook all food thoroughly. This kills bacteria and helps you avoid intestinal infection. Soft foods are easier to swallow and digest.   Activity   Exercise keeps you strong and keeps your heart and lungs active. Talk to your doctor about an appropriate exercise program for you.   Skin Care   To prevent a skin infection, bathe or shower once a day. Use a moisturizing soap and wash with warm water. Avoid very hot or cold water. Chemotherapy can make your skin dry . Apply moisturizing lotion to help relieve dry skin. Some drugs used in high doses can cause slight burns to appear (like sunburn). Ask for a special cream to help relieve the burn and protect your skin.   Prevent Mouth Sores   During chemotherapy, many people get mouth sores. Do the following to help prevent mouth sores or to ease discomfort.   Brush your teeth with a soft-bristle toothbrush after every meal.  Don't use dental floss if your platelet count is below 50,000. Your doctor or nurse will tell you if this is the case.  Use an oral swab or special soft toothbrush if your  gums bleed during regular brushing.  Use mouthwash as directed. If you can't tolerate commercial mouthwash, use salt and baking soda to clean your mouth. Mix 1 teaspoon of salt and 1 teaspoon of baking soda into a glass of water. Swish and spit.  Call your doctor or return to this facility if you develop any of the following:   Sore throat   White patches in the mouth or throat   Fever of 100.4ºF (38ºC) or higher, or as directed by your healthcare provider  © 5277-9877 [employer], [address] [place] [address]. All rights reserved. This information is not intended as a substitute for professional medical care. Always follow your healthcare professional's   WAYS TO HELP PREVENT INFECTION         WASH YOUR HANDS OFTEN DURING THE DAY, ESPECIALLY BEFORE YOU EAT, AFTER USING THE BATHROOM, AND AFTER TOUCHING ANIMALS     STAY AWAY FROM PEOPLE WHO HAVE ILLNESSES YOU CAN CATCH; SUCH AS COLDS, FLU, CHICKEN POX     TRY TO AVOID CROWDS     STAY AWAY FROM CHILDREN WHO RECENTLY HAVE RECEIVED LIVE VIRUS VACCINES     MAINTAIN GOOD MOUTH CARE     DO NOT SQUEEZE OR SCRATCH PIMPLES     CLEAN CUTS & SCRAPES RIGHT AWAY AND DAILY UNTIL HEALED WITH WARM WATER, SOAP & AN ANTISEPTIC     AVOID CONTACT WITH LITTER BOXES, BIRD CAGES, & FISH TANKS     AVOID STANDING WATER, IE., BIRD BATHS, FLOWER POTS/VASES, OR HUMIDIFIERS     WEAR GLOVES WHEN GARDENING OR CLEANING UP AFTER OTHERS, ESPECIALLY BABIES & SMALL CHILDREN    DO NOT EAT RAW FISH, SEAFOOD, MEAT, OR EGGSFALL PREVENTION   Falls often occur due to slipping, tripping or losing your balance. Here are ways to reduce your risk of falling again.   Was there anything that caused your fall that can be fixed, removed or replaced?   Make your home safe by keeping walkways clear of objects you may trip over.   Use non-slip pads under rugs.   Do not walk in poorly lit areas.   Do not stand on chairs or wobbly ladders.   Use caution when reaching overhead or looking  upward. This position can cause a loss of balance.   Be sure your shoes fit properly, have non-slip bottoms and are in good condition.   Be cautious when going up and down stairs, curbs, and when walking on uneven sidewalks.   If your balance is poor, consider using a cane or walker.   If your fall was related to alcohol use, stop or limit alcohol intake.   If your fall was related to use of sleeping medicines, talk to your doctor about this. You may need to reduce your dosage at bedtime if you awaken during the night to go to the bathroom.   To reduce the need for nighttime bathroom trips:   Avoid drinking fluids for several hours before going to bed   Empty your bladder before going to bed   Men can keep a urinal at the bedside   © 8493-1911 [facility], [address] [place] [address], [place] [address]. All rights reserved. This information is not intended as a substitute for professional medical care. Always follow your healthcare professional's instructions.          Your Current Medications Are     ondansetron (ZOFRAN) 8 MG tablet    valacyclovir (VALTREX) 500 MG tablet      Facility-Administered Medications     azacitidine injection 140 mg    ondansetron tablet 8 mg      Appointments for Next Year     4/20/2017 10:30 AM INFUSION 015 MIN (15 min.) [facility] CHAIR 03 ONLH    Arrive at check-in approximately 15 minutes before your scheduled appointment time. Bring all outside medical records and imaging, along with a list of your current medications and insurance card.    (off [facility]) 1st floor    4/21/2017 10:30 AM INFUSION 015 MIN (15 min.) [facility]AIR 03 [facility]    Arrive at check-in approximately 15 minutes before your scheduled appointment time. Bring all outside medical records and imaging, along with a list of your current medications and insurance card.    (off [facility]) 1st floor    4/25/2017  9:30 AM NON FASTING LAB (10 min.) [facility] LABORATORY,  [facility]    Arrive at check-in approximately 15 minutes before your scheduled appointment time. Bring all outside medical records and imaging, along with a list of your current medications and insurance card.    4/25/2017 10:00 AM ESTABLISHED PATIENT (20 min.) [facility] - Hematology Oncology Lavell Flor MD    Arrive at check-in approximately 15 minutes before your scheduled appointment time. Bring all outside medical records and imaging, along with a list of your current medications and insurance card.    (off [facility]) 1st Floor Appointments with [facility] - 2nd Floor         Default Flowsheet Data (last 24 hours)      Amb Complex Vitals Rony        04/19/17 1306 04/19/17 1257             Measurements    BP (!)  148/84        Temp 98.8 °F (37.1 °C)        Pulse 82        Resp 16        Pain Assessment    Pain Score  Zero               Allergies     No Known Allergies      Medications You Received from 04/18/2017 1341 to 04/19/2017 1341        Date/Time Order Dose Route Action     04/19/2017 1337 azacitidine injection 140 mg 140 mg Subcutaneous Given     04/19/2017 1307 ondansetron tablet 8 mg 8 mg Oral Given      Current Discharge Medication List     Cannot display discharge medications since this is not an admission.

## 2017-04-19 NOTE — NURSING
Injection given without difficulties.Bandaid applied. Patient instructed to stay in the clinic for 15 minutes. Patient verbalized understanding and will notify nurse with any complaints.    Pt spoke with  and  today regarding financial concerns.

## 2017-04-19 NOTE — PROGRESS NOTES
Met with pt to f/u and discuss financial assistance. He has already been set up with the  Foundation to help with some assistance. Unfortunately, there are no foundations offering grants for AML at the present time. The pt's  at [facility] has already enrolled him in the [employer] Patient Assistance Program. [employer] is working with the patient to get assistance through this avenue. Will continue to search for additional assistance so that pt can continue to get his treatments. Will continue to f/u.

## 2017-04-20 ENCOUNTER — INFUSION (OUTPATIENT)
Dept: INFUSION THERAPY | Facility: HOSPITAL | Age: 62
End: 2017-04-20
Attending: INTERNAL MEDICINE
Payer: MEDICARE

## 2017-04-20 VITALS
DIASTOLIC BLOOD PRESSURE: 72 MMHG | RESPIRATION RATE: 16 BRPM | SYSTOLIC BLOOD PRESSURE: 128 MMHG | HEART RATE: 79 BPM | TEMPERATURE: 98 F

## 2017-04-20 DIAGNOSIS — C92.00 ACUTE MYELOID LEUKEMIA NOT HAVING ACHIEVED REMISSION: Primary | ICD-10-CM

## 2017-04-20 DIAGNOSIS — C92.02 ACUTE MYELOID LEUKEMIA IN RELAPSE: ICD-10-CM

## 2017-04-20 PROCEDURE — 63600175 PHARM REV CODE 636 W HCPCS: Performed by: INTERNAL MEDICINE

## 2017-04-20 PROCEDURE — 96401 CHEMO ANTI-NEOPL SQ/IM: CPT

## 2017-04-20 PROCEDURE — 25000003 PHARM REV CODE 250: Performed by: INTERNAL MEDICINE

## 2017-04-20 RX ORDER — ONDANSETRON HYDROCHLORIDE 8 MG/1
8 TABLET, FILM COATED ORAL ONCE
Status: COMPLETED | OUTPATIENT
Start: 2017-04-20 | End: 2017-04-20

## 2017-04-20 RX ORDER — AZACITIDINE 100 MG/1
75 INJECTION, POWDER, LYOPHILIZED, FOR SOLUTION INTRAVENOUS; SUBCUTANEOUS
Status: COMPLETED | OUTPATIENT
Start: 2017-04-20 | End: 2017-04-20

## 2017-04-20 RX ADMIN — ONDANSETRON 8 MG: 8 TABLET, FILM COATED ORAL at 10:04

## 2017-04-20 RX ADMIN — AZACITIDINE 140 MG: 100 INJECTION, POWDER, LYOPHILIZED, FOR SOLUTION INTRAVENOUS; SUBCUTANEOUS at 11:04

## 2017-04-20 NOTE — MR AVS SNAPSHOT
Patient Information     Patient Name Sex Carlos Morales Jr. Male 1955      Visit Information        Provider Department Dept Phone Center    2017 10:30 AM [facility] On [facility]otherapy Infusion [phone] [facility]      Patient Instructions      [facility]  [address]  [address] [address]  [phone] phone     [phone] fax  Hours of Operation: Monday- Friday 8:00am- 5:00pm  After hours phone  [phone]  Hematology / Oncology Physicians on call      Dr. Tristan Gardiner    Please call with any concerns regarding your appointment today.FALL PREVENTION   Falls often occur due to slipping, tripping or losing your balance. Here are ways to reduce your risk of falling again.   Was there anything that caused your fall that can be fixed, removed or replaced?   Make your home safe by keeping walkways clear of objects you may trip over.   Use non-slip pads under rugs.   Do not walk in poorly lit areas.   Do not stand on chairs or wobbly ladders.   Use caution when reaching overhead or looking upward. This position can cause a loss of balance.   Be sure your shoes fit properly, have non-slip bottoms and are in good condition.   Be cautious when going up and down stairs, curbs, and when walking on uneven sidewalks.   If your balance is poor, consider using a cane or walker.   If your fall was related to alcohol use, stop or limit alcohol intake.   If your fall was related to use of sleeping medicines, talk to your doctor about this. You may need to reduce your dosage at bedtime if you awaken during the night to go to the bathroom.   To reduce the need for nighttime bathroom trips:   Avoid drinking fluids for several hours before going to bed   Empty your bladder before going to bed   Men can keep a urinal at the bedside   © 9323-8496 [employer], [address] [place] [address], [place] [address]. All rights  reserved. This information is not intended as a substitute for professional medical care. Always follow your healthcare professional's instructions.  HOME CARE AFTER CHEMOTHERAPY   Meals   Many patients feel sick and lose their appetites during treatment. Eat small meals several times a day. Choose bland foods with little taste or smell if you have problems with nausea. Be sure to cook all food thoroughly. This kills bacteria and helps you avoid intestinal infection. Soft foods are easier to swallow and digest.   Activity   Exercise keeps you strong and keeps your heart and lungs active. Talk to your doctor about an appropriate exercise program for you.   Skin Care   To prevent a skin infection, bathe or shower once a day. Use a moisturizing soap and wash with warm water. Avoid very hot or cold water. Chemotherapy can make your skin dry . Apply moisturizing lotion to help relieve dry skin. Some drugs used in high doses can cause slight burns to appear (like sunburn). Ask for a special cream to help relieve the burn and protect your skin.   Prevent Mouth Sores   During chemotherapy, many people get mouth sores. Do the following to help prevent mouth sores or to ease discomfort.   Brush your teeth with a soft-bristle toothbrush after every meal.  Don't use dental floss if your platelet count is below 50,000. Your doctor or nurse will tell you if this is the case.  Use an oral swab or special soft toothbrush if your gums bleed during regular brushing.  Use mouthwash as directed. If you can't tolerate commercial mouthwash, use salt and baking soda to clean your mouth. Mix 1 teaspoon of salt and 1 teaspoon of baking soda into a glass of water. Swish and spit.  Call your doctor or return to this facility if you develop any of the following:   Sore throat   White patches in the mouth or throat   Fever of 100.4ºF (38ºC) or higher, or as directed by your healthcare provider  © 9128-7901 [employer], [address] [place] [address]  Road, [place] [address]. All rights reserved. This information is not intended as a substitute for professional medical care. Always follow your healthcare professional's   WAYS TO HELP PREVENT INFECTION         WASH YOUR HANDS OFTEN DURING THE DAY, ESPECIALLY BEFORE YOU EAT, AFTER USING THE BATHROOM, AND AFTER TOUCHING ANIMALS     STAY AWAY FROM PEOPLE WHO HAVE ILLNESSES YOU CAN CATCH; SUCH AS COLDS, FLU, CHICKEN POX     TRY TO AVOID CROWDS     STAY AWAY FROM CHILDREN WHO RECENTLY HAVE RECEIVED LIVE VIRUS VACCINES     MAINTAIN GOOD MOUTH CARE     DO NOT SQUEEZE OR SCRATCH PIMPLES     CLEAN CUTS & SCRAPES RIGHT AWAY AND DAILY UNTIL HEALED WITH WARM WATER, SOAP & AN ANTISEPTIC     AVOID CONTACT WITH LITTER BOXES, BIRD CAGES, & FISH TANKS     AVOID STANDING WATER, IE., BIRD BATHS, FLOWER POTS/VASES, OR HUMIDIFIERS     WEAR GLOVES WHEN GARDENING OR CLEANING UP AFTER OTHERS, ESPECIALLY BABIES & SMALL CHILDREN     DO NOT EAT RAW FISH, SEAFOOD, MEAT, OR EGGS       Your Current Medications Are     ondansetron (ZOFRAN) 8 MG tablet    valacyclovir (VALTREX) 500 MG tablet      Facility-Administered Medications     azacitidine injection 140 mg    azacitidine injection 140 mg    ondansetron tablet 8 mg    ondansetron tablet 8 mg      Appointments for Next Year     4/21/2017 10:30 AM INFUSION 015 MIN (15 min.) [facility] 03 [facility]    Arrive at check-in approximately 15 minutes before your scheduled appointment time. Bring all outside medical records and imaging, along with a list of your current medications and insurance card.    (off [facility]) 1st floor    4/25/2017  9:30 AM NON FASTING LAB (10 min.) [facility], [facility]    Arrive at check-in approximately 15 minutes before your scheduled appointment time. Bring all outside medical records and imaging, along with a list of your current medications and insurance card.    4/25/2017 10:00 AM ESTABLISHED PATIENT (20  min.) [facility] - Hematology Oncology Lavell Flor MD    Arrive at check-in approximately 15 minutes before your scheduled appointment time. Bring all outside medical records and imaging, along with a list of your current medications and insurance card.    (off [facility]) 1st Floor Appointments with [facility] - 2nd Floor         Default Flowsheet Data (last 24 hours)      Amb Complex Vitals Rony        04/20/17 1025 04/20/17 1015 04/19/17 1306 04/19/17 1257       Measurements    /72  (!)  148/84      Temp 98 °F (36.7 °C)  98.8 °F (37.1 °C)      Pulse 79  82      Resp 16  16      Pain Assessment    Pain Score  Zero  Zero             Allergies     No Known Allergies      Medications You Received from 04/19/2017 1118 to 04/20/2017 1118        Date/Time Order Dose Route Action     04/20/2017 1113 azacitidine injection 140 mg 140 mg Subcutaneous Given     04/20/2017 1023 ondansetron tablet 8 mg 8 mg Oral Given      Current Discharge Medication List     Cannot display discharge medications since this is not an admission.

## 2017-04-20 NOTE — PLAN OF CARE
Problem: Patient Care Overview  Goal: Plan of Care Review  Outcome: Ongoing (interventions implemented as appropriate)  i feel the same no compliants

## 2017-04-20 NOTE — PATIENT INSTRUCTIONS
[facility]  [address]  [address] [address]  [phone] phone     [phone] fax  Hours of Operation: Monday- Friday 8:00am- 5:00pm  After hours phone  [phone]  Hematology / Oncology Physicians on call      Dr. Tristan Gardiner    Please call with any concerns regarding your appointment today.FALL PREVENTION   Falls often occur due to slipping, tripping or losing your balance. Here are ways to reduce your risk of falling again.   Was there anything that caused your fall that can be fixed, removed or replaced?   Make your home safe by keeping walkways clear of objects you may trip over.   Use non-slip pads under rugs.   Do not walk in poorly lit areas.   Do not stand on chairs or wobbly ladders.   Use caution when reaching overhead or looking upward. This position can cause a loss of balance.   Be sure your shoes fit properly, have non-slip bottoms and are in good condition.   Be cautious when going up and down stairs, curbs, and when walking on uneven sidewalks.   If your balance is poor, consider using a cane or walker.   If your fall was related to alcohol use, stop or limit alcohol intake.   If your fall was related to use of sleeping medicines, talk to your doctor about this. You may need to reduce your dosage at bedtime if you awaken during the night to go to the bathroom.   To reduce the need for nighttime bathroom trips:   Avoid drinking fluids for several hours before going to bed   Empty your bladder before going to bed   Men can keep a urinal at the bedside   © 3334-1645 [employer], [address] [place] [address], [place] [address]. All rights reserved. This information is not intended as a substitute for professional medical care. Always follow your healthcare professional's instructions.  HOME CARE AFTER CHEMOTHERAPY   Meals   Many patients feel sick and lose their appetites during treatment. Eat small meals several  times a day. Choose bland foods with little taste or smell if you have problems with nausea. Be sure to cook all food thoroughly. This kills bacteria and helps you avoid intestinal infection. Soft foods are easier to swallow and digest.   Activity   Exercise keeps you strong and keeps your heart and lungs active. Talk to your doctor about an appropriate exercise program for you.   Skin Care   To prevent a skin infection, bathe or shower once a day. Use a moisturizing soap and wash with warm water. Avoid very hot or cold water. Chemotherapy can make your skin dry . Apply moisturizing lotion to help relieve dry skin. Some drugs used in high doses can cause slight burns to appear (like sunburn). Ask for a special cream to help relieve the burn and protect your skin.   Prevent Mouth Sores   During chemotherapy, many people get mouth sores. Do the following to help prevent mouth sores or to ease discomfort.   Brush your teeth with a soft-bristle toothbrush after every meal.  Don't use dental floss if your platelet count is below 50,000. Your doctor or nurse will tell you if this is the case.  Use an oral swab or special soft toothbrush if your gums bleed during regular brushing.  Use mouthwash as directed. If you can't tolerate commercial mouthwash, use salt and baking soda to clean your mouth. Mix 1 teaspoon of salt and 1 teaspoon of baking soda into a glass of water. Swish and spit.  Call your doctor or return to this facility if you develop any of the following:   Sore throat   White patches in the mouth or throat   Fever of 100.4ºF (38ºC) or higher, or as directed by your healthcare provider  © 9558-1975 [employer], [address] [place] [address], [place] [address]. All rights reserved. This information is not intended as a substitute for professional medical care. Always follow your healthcare professional's   WAYS TO HELP PREVENT INFECTION         WASH YOUR HANDS OFTEN DURING THE DAY, ESPECIALLY BEFORE YOU EAT, AFTER  USING THE BATHROOM, AND AFTER TOUCHING ANIMALS     STAY AWAY FROM PEOPLE WHO HAVE ILLNESSES YOU CAN CATCH; SUCH AS COLDS, FLU, CHICKEN POX     TRY TO AVOID CROWDS     STAY AWAY FROM CHILDREN WHO RECENTLY HAVE RECEIVED LIVE VIRUS VACCINES     MAINTAIN GOOD MOUTH CARE     DO NOT SQUEEZE OR SCRATCH PIMPLES     CLEAN CUTS & SCRAPES RIGHT AWAY AND DAILY UNTIL HEALED WITH WARM WATER, SOAP & AN ANTISEPTIC     AVOID CONTACT WITH LITTER BOXES, BIRD CAGES, & FISH TANKS     AVOID STANDING WATER, IE., BIRD BATHS, FLOWER POTS/VASES, OR HUMIDIFIERS     WEAR GLOVES WHEN GARDENING OR CLEANING UP AFTER OTHERS, ESPECIALLY BABIES & SMALL CHILDREN     DO NOT EAT RAW FISH, SEAFOOD, MEAT, OR EGGS

## 2017-04-21 ENCOUNTER — SOCIAL WORK (OUTPATIENT)
Dept: HEMATOLOGY/ONCOLOGY | Facility: CLINIC | Age: 62
End: 2017-04-21
Payer: MEDICARE

## 2017-04-21 ENCOUNTER — INFUSION (OUTPATIENT)
Dept: INFUSION THERAPY | Facility: HOSPITAL | Age: 62
End: 2017-04-21
Attending: INTERNAL MEDICINE
Payer: MEDICARE

## 2017-04-21 VITALS
TEMPERATURE: 99 F | HEART RATE: 88 BPM | BODY MASS INDEX: 24.59 KG/M2 | DIASTOLIC BLOOD PRESSURE: 79 MMHG | HEIGHT: 68 IN | SYSTOLIC BLOOD PRESSURE: 133 MMHG | RESPIRATION RATE: 16 BRPM | WEIGHT: 162.25 LBS

## 2017-04-21 DIAGNOSIS — C92.00 ACUTE MYELOID LEUKEMIA NOT HAVING ACHIEVED REMISSION: ICD-10-CM

## 2017-04-21 DIAGNOSIS — C92.02 ACUTE MYELOID LEUKEMIA IN RELAPSE: Primary | ICD-10-CM

## 2017-04-21 PROCEDURE — 96401 CHEMO ANTI-NEOPL SQ/IM: CPT

## 2017-04-21 PROCEDURE — 25000003 PHARM REV CODE 250: Performed by: INTERNAL MEDICINE

## 2017-04-21 PROCEDURE — 63600175 PHARM REV CODE 636 W HCPCS: Performed by: INTERNAL MEDICINE

## 2017-04-21 RX ORDER — AZACITIDINE 100 MG/1
75 INJECTION, POWDER, LYOPHILIZED, FOR SOLUTION INTRAVENOUS; SUBCUTANEOUS
Status: COMPLETED | OUTPATIENT
Start: 2017-04-21 | End: 2017-04-21

## 2017-04-21 RX ORDER — ONDANSETRON HYDROCHLORIDE 8 MG/1
8 TABLET, FILM COATED ORAL ONCE
Status: COMPLETED | OUTPATIENT
Start: 2017-04-21 | End: 2017-04-21

## 2017-04-21 RX ADMIN — ONDANSETRON 8 MG: 8 TABLET, FILM COATED ORAL at 10:04

## 2017-04-21 RX ADMIN — AZACITIDINE 140 MG: 100 INJECTION, POWDER, LYOPHILIZED, FOR SOLUTION INTRAVENOUS; SUBCUTANEOUS at 11:04

## 2017-04-21 NOTE — PROGRESS NOTES
Met with pt to f/u. Unfortunately [employer] is unable to assist the patient because he has a Medicare plan. Additionally, there are no other open hugh to help the patient. He is already enrolled in the  Foundation's program. Discussed the last and only other options, including the [employer] (which we can use to help with travel expenses) as well as the [employer] Fund (for which the patient will need to furnish receipts). Applications completed for both funds. Will submit [employer] for processing when all appropriate signatures obtained. Will submit [employer] hugh request when pt brings receipts (hopefully next week). Encouraged pt to work out payment arrangements with financial team as he said he only has one treatment left. Will continue to follow and provide support/assistance as indicated by needs.

## 2017-04-21 NOTE — MR AVS SNAPSHOT
Patient Information     Patient Name Sex Carlos Morales Jr. Male 1955      Visit Information        Provider Department Dept Phone Center    2017 10:30 AM [facility] [phone] [place]      Patient Instructions      [facility]  [address]  [address] [address]  [phone] phone     [phone] fax  Hours of Operation: Monday- Friday 8:00am- 5:00pm  After hours phone  [phone]  Hematology / Oncology Physicians on call      Dr. Tristan Gardiner    Please call with any concerns regarding your appointment today.    FALL PREVENTION   Falls often occur due to slipping, tripping or losing your balance. Here are ways to reduce your risk of falling again.   Was there anything that caused your fall that can be fixed, removed or replaced?   Make your home safe by keeping walkways clear of objects you may trip over.   Use non-slip pads under rugs.   Do not walk in poorly lit areas.   Do not stand on chairs or wobbly ladders.   Use caution when reaching overhead or looking upward. This position can cause a loss of balance.   Be sure your shoes fit properly, have non-slip bottoms and are in good condition.   Be cautious when going up and down stairs, curbs, and when walking on uneven sidewalks.   If your balance is poor, consider using a cane or walker.   If your fall was related to alcohol use, stop or limit alcohol intake.   If your fall was related to use of sleeping medicines, talk to your doctor about this. You may need to reduce your dosage at bedtime if you awaken during the night to go to the bathroom.   To reduce the need for nighttime bathroom trips:   Avoid drinking fluids for several hours before going to bed   Empty your bladder before going to bed   Men can keep a urinal at the bedside   © 9287-7093 [employer], [address] [place] [address], [place] [address]. All rights  reserved. This information is not intended as a substitute for professional medical care. Always follow your healthcare professional's instructions.    HOME CARE AFTER CHEMOTHERAPY   Meals   Many patients feel sick and lose their appetites during treatment. Eat small meals several times a day. Choose bland foods with little taste or smell if you have problems with nausea. Be sure to cook all food thoroughly. This kills bacteria and helps you avoid intestinal infection. Soft foods are easier to swallow and digest.   Activity   Exercise keeps you strong and keeps your heart and lungs active. Talk to your doctor about an appropriate exercise program for you.   Skin Care   To prevent a skin infection, bathe or shower once a day. Use a moisturizing soap and wash with warm water. Avoid very hot or cold water. Chemotherapy can make your skin dry . Apply moisturizing lotion to help relieve dry skin. Some drugs used in high doses can cause slight burns to appear (like sunburn). Ask for a special cream to help relieve the burn and protect your skin.   Prevent Mouth Sores   During chemotherapy, many people get mouth sores. Do the following to help prevent mouth sores or to ease discomfort.   Brush your teeth with a soft-bristle toothbrush after every meal.  Don't use dental floss if your platelet count is below 50,000. Your doctor or nurse will tell you if this is the case.  Use an oral swab or special soft toothbrush if your gums bleed during regular brushing.  Use mouthwash as directed. If you can't tolerate commercial mouthwash, use salt and baking soda to clean your mouth. Mix 1 teaspoon of salt and 1 teaspoon of baking soda into a glass of water. Swish and spit.  Call your doctor or return to this facility if you develop any of the following:   Sore throat   White patches in the mouth or throat   Fever of 100.4ºF (38ºC) or higher, or as directed by your healthcare provider  © 1670-6148 [employer], [address] [place] [address]  Road, [place] [address]. All rights reserved. This information is not intended as a substitute for professional medical care. Always follow your healthcare professional's     YOU HAVE STARTED ON CHEMOTHERAPY. IF YOU EXPERIENCE ANY OF THE FOLLOWING PROBLEMS, CALL THE OFFICE IMMEDIATELY.    *FEVER .0 OR GREATER    *CHILLS, ESPECIALLY SHAKING CHILLS, OR SWEATING    *A SEVERE COUGH OR SORE THROAT, OR SINUS PAIN/     PRESSURE    *REDNESS, SWELLING, OR TENDERNESS AROUND A WOUND,     SORE, PIMPLE, RECTAL AREA, OR IV SITE    *SORES OR ULCERS IN THE MOUTH    *BLISTERS ON THE LIPS OR SKIN    *FREQUENT URGENCY TO URINATE OR A BURNING FEELING   WHEN YOU URINATE    *BLOOD IN THE URINE OR STOOL    *ANY UNEXPLAINED BRUISING OR PROLONGED BLEEDING,     (NOSEBLEEDS OR BLEEDING GUMS)    *LOOSE BOWEL MOVEMENTS THAT DO NOT RESPOND TO     IMODIUM OR MORE THAN THREE TIMES A DAY    *VOMITING UNRESPONSIVE TO ANTINAUSEA MEDICINE    *ANY UNUSUAL PHYSICAL SYMPTOMS THAT BEGAN AFTER     CHEMOTHERAPY    DURING WEEKDAYS, CALL AND ASK TO SPEAK DIRECTLY TO A NURSE.  AT OTHER TIMES, CALL THE OFFICE PHONE NUMBER; THE ANSWERING SERVICE WILL CONTACT THE ON-CALL PHYSICIAN.  SOMEONE IS AVAILABLE 24 HOURS A DAY, SEVEN DAYS A WEEK.       Your Current Medications Are     ondansetron (ZOFRAN) 8 MG tablet    valacyclovir (VALTREX) 500 MG tablet      Facility-Administered Medications     azacitidine injection 140 mg    azacitidine injection 140 mg    ondansetron tablet 8 mg      Appointments for Next Year     4/25/2017  9:30 AM NON FASTING LAB (10 min.) [facility], [facility]    Arrive at check-in approximately 15 minutes before your scheduled appointment time. Bring all outside medical records and imaging, along with a list of your current medications and insurance card.    4/25/2017 10:00 AM ESTABLISHED PATIENT (20 min.) [facility]ology On[facility] Lavell Flor MD    Arrive at check-in approximately 15 minutes before  "your scheduled appointment time. Bring all outside medical records and imaging, along with a list of your current medications and insurance card.    (off [facility]) 1st Floor [facility]         Default Flowsheet Data (last 24 hours)      Amb Complex Vitals Rony        04/21/17 1018                Measurements    Weight 73.6 kg (162 lb 4.1 oz)        Height 5' 8" (1.727 m)        BSA (Calculated - sq m) 1.88 sq meters        BMI (Calculated) 24.7        /79        Temp 98.6 °F (37 °C)        Pulse 88        Resp 16        Pain Assessment    Pain Score Zero                Allergies     No Known Allergies      Medications You Received from 04/20/2017 1057 to 04/21/2017 1057        Date/Time Order Dose Route Action     04/21/2017 1023 ondansetron tablet 8 mg 8 mg Oral Given      Current Discharge Medication List     Cannot display discharge medications since this is not an admission.      "

## 2017-04-21 NOTE — PLAN OF CARE
Problem: Patient Care Overview  Goal: Plan of Care Review  Outcome: Ongoing (interventions implemented as appropriate)  Patient states that he is feeling good.  States that he his happy these are his last shots this week.

## 2017-04-21 NOTE — PATIENT INSTRUCTIONS
[facility]  [address]  [address] [address]  [phone] phone     [phone] fax  Hours of Operation: Monday- Friday 8:00am- 5:00pm  After hours phone  [phone]  Hematology / Oncology Physicians on call      Dr. Tristan Gardiner    Please call with any concerns regarding your appointment today.    FALL PREVENTION   Falls often occur due to slipping, tripping or losing your balance. Here are ways to reduce your risk of falling again.   Was there anything that caused your fall that can be fixed, removed or replaced?   Make your home safe by keeping walkways clear of objects you may trip over.   Use non-slip pads under rugs.   Do not walk in poorly lit areas.   Do not stand on chairs or wobbly ladders.   Use caution when reaching overhead or looking upward. This position can cause a loss of balance.   Be sure your shoes fit properly, have non-slip bottoms and are in good condition.   Be cautious when going up and down stairs, curbs, and when walking on uneven sidewalks.   If your balance is poor, consider using a cane or walker.   If your fall was related to alcohol use, stop or limit alcohol intake.   If your fall was related to use of sleeping medicines, talk to your doctor about this. You may need to reduce your dosage at bedtime if you awaken during the night to go to the bathroom.   To reduce the need for nighttime bathroom trips:   Avoid drinking fluids for several hours before going to bed   Empty your bladder before going to bed   Men can keep a urinal at the bedside   © 2705-1091 [employer], [address] [place] [address]. All rights reserved. This information is not intended as a substitute for professional medical care. Always follow your healthcare professional's instructions.    HOME CARE AFTER CHEMOTHERAPY   Meals   Many patients feel sick and lose their appetites during treatment. Eat small meals  several times a day. Choose bland foods with little taste or smell if you have problems with nausea. Be sure to cook all food thoroughly. This kills bacteria and helps you avoid intestinal infection. Soft foods are easier to swallow and digest.   Activity   Exercise keeps you strong and keeps your heart and lungs active. Talk to your doctor about an appropriate exercise program for you.   Skin Care   To prevent a skin infection, bathe or shower once a day. Use a moisturizing soap and wash with warm water. Avoid very hot or cold water. Chemotherapy can make your skin dry . Apply moisturizing lotion to help relieve dry skin. Some drugs used in high doses can cause slight burns to appear (like sunburn). Ask for a special cream to help relieve the burn and protect your skin.   Prevent Mouth Sores   During chemotherapy, many people get mouth sores. Do the following to help prevent mouth sores or to ease discomfort.   Brush your teeth with a soft-bristle toothbrush after every meal.  Don't use dental floss if your platelet count is below 50,000. Your doctor or nurse will tell you if this is the case.  Use an oral swab or special soft toothbrush if your gums bleed during regular brushing.  Use mouthwash as directed. If you can't tolerate commercial mouthwash, use salt and baking soda to clean your mouth. Mix 1 teaspoon of salt and 1 teaspoon of baking soda into a glass of water. Swish and spit.  Call your doctor or return to this facility if you develop any of the following:   Sore throat   White patches in the mouth or throat   Fever of 100.4ºF (38ºC) or higher, or as directed by your healthcare provider  © 8978-4056 [employer], [address] [place] [address], [place] [address]. All rights reserved. This information is not intended as a substitute for professional medical care. Always follow your healthcare professional's     YOU HAVE STARTED ON CHEMOTHERAPY. IF YOU EXPERIENCE ANY OF THE FOLLOWING PROBLEMS, CALL THE OFFICE  IMMEDIATELY.    *FEVER .0 OR GREATER    *CHILLS, ESPECIALLY SHAKING CHILLS, OR SWEATING    *A SEVERE COUGH OR SORE THROAT, OR SINUS PAIN/     PRESSURE    *REDNESS, SWELLING, OR TENDERNESS AROUND A WOUND,     SORE, PIMPLE, RECTAL AREA, OR IV SITE    *SORES OR ULCERS IN THE MOUTH    *BLISTERS ON THE LIPS OR SKIN    *FREQUENT URGENCY TO URINATE OR A BURNING FEELING   WHEN YOU URINATE    *BLOOD IN THE URINE OR STOOL    *ANY UNEXPLAINED BRUISING OR PROLONGED BLEEDING,     (NOSEBLEEDS OR BLEEDING GUMS)    *LOOSE BOWEL MOVEMENTS THAT DO NOT RESPOND TO     IMODIUM OR MORE THAN THREE TIMES A DAY    *VOMITING UNRESPONSIVE TO ANTINAUSEA MEDICINE    *ANY UNUSUAL PHYSICAL SYMPTOMS THAT BEGAN AFTER     CHEMOTHERAPY    DURING WEEKDAYS, CALL AND ASK TO SPEAK DIRECTLY TO A NURSE.  AT OTHER TIMES, CALL THE OFFICE PHONE NUMBER; THE ANSWERING SERVICE WILL CONTACT THE ON-CALL PHYSICIAN.  SOMEONE IS AVAILABLE 24 HOURS A DAY, SEVEN DAYS A WEEK.

## 2017-04-25 ENCOUNTER — LAB VISIT (OUTPATIENT)
Dept: LAB | Facility: HOSPITAL | Age: 62
End: 2017-04-25
Attending: INTERNAL MEDICINE
Payer: MEDICARE

## 2017-04-25 ENCOUNTER — OFFICE VISIT (OUTPATIENT)
Dept: HEMATOLOGY/ONCOLOGY | Facility: CLINIC | Age: 62
End: 2017-04-25
Payer: MEDICARE

## 2017-04-25 ENCOUNTER — SOCIAL WORK (OUTPATIENT)
Dept: HEMATOLOGY/ONCOLOGY | Facility: CLINIC | Age: 62
End: 2017-04-25
Payer: MEDICARE

## 2017-04-25 VITALS
TEMPERATURE: 98 F | BODY MASS INDEX: 24.56 KG/M2 | OXYGEN SATURATION: 99 % | HEIGHT: 68 IN | WEIGHT: 162.06 LBS | RESPIRATION RATE: 20 BRPM | DIASTOLIC BLOOD PRESSURE: 80 MMHG | HEART RATE: 84 BPM | SYSTOLIC BLOOD PRESSURE: 146 MMHG

## 2017-04-25 DIAGNOSIS — C92.02 ACUTE MYELOID LEUKEMIA IN RELAPSE: ICD-10-CM

## 2017-04-25 DIAGNOSIS — C92.02 ACUTE MYELOID LEUKEMIA IN RELAPSE: Primary | ICD-10-CM

## 2017-04-25 LAB
BASOPHILS # BLD AUTO: ABNORMAL K/UL
BASOPHILS NFR BLD: 0 %
DIFFERENTIAL METHOD: ABNORMAL
EOSINOPHIL # BLD AUTO: ABNORMAL K/UL
EOSINOPHIL NFR BLD: 0 %
ERYTHROCYTE [DISTWIDTH] IN BLOOD BY AUTOMATED COUNT: 12.5 %
HCT VFR BLD AUTO: 34 %
HGB BLD-MCNC: 11.7 G/DL
LYMPHOCYTES # BLD AUTO: ABNORMAL K/UL
LYMPHOCYTES NFR BLD: 73 %
MCH RBC QN AUTO: 38.1 PG
MCHC RBC AUTO-ENTMCNC: 34.4 %
MCV RBC AUTO: 111 FL
MONOCYTES # BLD AUTO: ABNORMAL K/UL
MONOCYTES NFR BLD: 5 %
NEUTROPHILS NFR BLD: 19 %
NEUTS BAND NFR BLD MANUAL: 3 %
PLATELET # BLD AUTO: 61 K/UL
PLATELET BLD QL SMEAR: ABNORMAL
PMV BLD AUTO: 9.9 FL
RBC # BLD AUTO: 3.07 M/UL
WBC # BLD AUTO: 1.57 K/UL

## 2017-04-25 PROCEDURE — 85027 COMPLETE CBC AUTOMATED: CPT

## 2017-04-25 PROCEDURE — 36415 COLL VENOUS BLD VENIPUNCTURE: CPT

## 2017-04-25 PROCEDURE — 99999 PR PBB SHADOW E&M-EST. PATIENT-LVL III: CPT | Mod: PBBFAC,,, | Performed by: INTERNAL MEDICINE

## 2017-04-25 PROCEDURE — 99214 OFFICE O/P EST MOD 30 MIN: CPT | Mod: S$GLB,,, | Performed by: INTERNAL MEDICINE

## 2017-04-25 PROCEDURE — 1160F RVW MEDS BY RX/DR IN RCRD: CPT | Mod: S$GLB,,, | Performed by: INTERNAL MEDICINE

## 2017-04-25 NOTE — PROGRESS NOTES
Subjective:       Patient ID: Carlos Jordan Jr. is a 62 y.o. male.    Chief Complaint: Follow-up    HPI This is a 62 year-old gentleman who comes for follow up of his AML.  He was found to have pancytopenia during a routine cbc done at the ER because of other complains.  A bone marrow was done on 11/4/2016. 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  He said he was not interested in discussing a stem cell transplant .  He said he would prefer to be treated with a low toxicity agent like Vidaza which would allow him to be treated as an outpatient.  He came in for C6   d9  of f VIDAZA  He says she feels well.  ALLERGIES: None.      MEDICATIONS: None.      PREVIOUS SURGERIES: Amputation of the tip of the right middle finger.      SOCIAL HISTORY: , has one child. He lives in [place]. He smokes half a  pack a day and has been doing this for 35 years. Denies significant drinking.   He is retired.      FAMILY HISTORY: Brother had brain tumor. Father had diabetes. No heart   attacks.      PAST MEDICAL HISTORY:  1. AML  2. Tobacco use.    3-thrombocytopenia  Review of Systems   Constitutional: Negative for appetite change and unexpected weight change.   Eyes: Negative for visual disturbance.   Respiratory: Negative for cough and shortness of breath.    Cardiovascular: Positive for chest pain.   Gastrointestinal: Negative for abdominal pain and diarrhea.   Genitourinary: Negative for frequency.   Musculoskeletal: Negative for back pain.   Skin: Negative for rash.   Neurological: Negative for headaches.   Hematological: Negative for adenopathy.   Psychiatric/Behavioral: The patient is nervous/anxious.        Objective:      Physical Exam   Constitutional: He is oriented to person, place, and time. He  appears well-developed and well-nourished.   HENT:   Head: Normocephalic.   Mouth/Throat: No oropharyngeal exudate.   Eyes: Pupils are equal, round, and reactive to light.   Neck: No thyromegaly present.   Cardiovascular: Normal rate, regular rhythm and normal heart sounds.  Exam reveals no gallop.    No murmur heard.  Pulmonary/Chest: No respiratory distress. He has no wheezes. He has no rales.   Abdominal: Soft. Bowel sounds are normal. He exhibits no distension and no mass. There is no rebound and no guarding.   Musculoskeletal: Normal range of motion. He exhibits no edema.   Lymphadenopathy:     He has no cervical adenopathy.   Neurological: He is alert and oriented to person, place, and time.   Skin: Skin is warm and dry.   Psychiatric: He has a normal mood and affect. His behavior is normal.       Wt Readings from Last 3 Encounters:   04/25/17 73.5 kg (162 lb 0.6 oz)   04/21/17 73.6 kg (162 lb 4.1 oz)   04/18/17 73.6 kg (162 lb 4.1 oz)     Temp Readings from Last 3 Encounters:   04/25/17 98.2 °F (36.8 °C) (Oral)   04/21/17 98.6 °F (37 °C)   04/20/17 98 °F (36.7 °C)     BP Readings from Last 3 Encounters:   04/25/17 (!) 146/80   04/21/17 133/79   04/20/17 128/72     Pulse Readings from Last 3 Encounters:   04/25/17 84   04/21/17 88   04/20/17 79           1. Acute myeloid leukemia in relapse      2-Thrombocytopenia  Plan:       Lab Results   Component Value Date    WBC 1.57 (LL) 04/25/2017    HGB 11.7 (L) 04/25/2017    HCT 34.0 (L) 04/25/2017     (H) 04/25/2017    PLT 61 (L) 04/25/2017       Counts acceptable. thrombocytoepnia is due to the therapy and his diagnosis.See em may 15 with a cbc for C6.

## 2017-04-25 NOTE — LETTER
April 25, 2017    Carlos Jordan Jr.  [address] [address]             [facility]  [id number] [place] [id number]  Phone: [phone]  Fax: [phone]   TO WHOM IT MAY CONCERN        I hereby certify that Mr Carlos Jordan Jr ( 1955) is my patient.    Mr Jordan has a diagnosed of acute myelocytic leukemia (AML)  and is undergoing treatments unser my supervision    If you have any questions regarding this letter, please do not hesitate to contact me.      Lavell Flor MD

## 2017-04-25 NOTE — PROGRESS NOTES
Met with pt to f/u as previously planned. He brought in water bills and electricity bills. Copies made of the bills/receipts. Will submit for reimbursement through [facility] Patient [facility] Family assistance fund. Will continue to be vigilant about any other assistance that might become available. Encouraged pt to f/u with any other needs as they arise. Will continue to follow and assist/provide support to pt according to needs.

## 2017-05-15 ENCOUNTER — INFUSION (OUTPATIENT)
Dept: INFUSION THERAPY | Facility: HOSPITAL | Age: 62
End: 2017-05-15
Attending: INTERNAL MEDICINE
Payer: MEDICARE

## 2017-05-15 ENCOUNTER — OFFICE VISIT (OUTPATIENT)
Dept: HEMATOLOGY/ONCOLOGY | Facility: CLINIC | Age: 62
End: 2017-05-15
Payer: MEDICARE

## 2017-05-15 VITALS
DIASTOLIC BLOOD PRESSURE: 60 MMHG | BODY MASS INDEX: 24.43 KG/M2 | WEIGHT: 161.19 LBS | SYSTOLIC BLOOD PRESSURE: 104 MMHG | BODY MASS INDEX: 24.43 KG/M2 | HEART RATE: 85 BPM | HEIGHT: 68 IN | HEIGHT: 68 IN | TEMPERATURE: 99 F | OXYGEN SATURATION: 98 % | RESPIRATION RATE: 16 BRPM | WEIGHT: 161.19 LBS

## 2017-05-15 DIAGNOSIS — C92.00 ACUTE MYELOID LEUKEMIA NOT HAVING ACHIEVED REMISSION: Primary | ICD-10-CM

## 2017-05-15 DIAGNOSIS — C92.02 ACUTE MYELOID LEUKEMIA IN RELAPSE: ICD-10-CM

## 2017-05-15 PROCEDURE — 25000003 PHARM REV CODE 250: Performed by: INTERNAL MEDICINE

## 2017-05-15 PROCEDURE — 96401 CHEMO ANTI-NEOPL SQ/IM: CPT

## 2017-05-15 PROCEDURE — 99214 OFFICE O/P EST MOD 30 MIN: CPT | Mod: 25,S$GLB,, | Performed by: INTERNAL MEDICINE

## 2017-05-15 PROCEDURE — 1160F RVW MEDS BY RX/DR IN RCRD: CPT | Mod: S$GLB,,, | Performed by: INTERNAL MEDICINE

## 2017-05-15 PROCEDURE — 99999 PR PBB SHADOW E&M-EST. PATIENT-LVL III: CPT | Mod: PBBFAC,,, | Performed by: INTERNAL MEDICINE

## 2017-05-15 PROCEDURE — 63600175 PHARM REV CODE 636 W HCPCS: Performed by: INTERNAL MEDICINE

## 2017-05-15 RX ORDER — ONDANSETRON 4 MG/1
4 TABLET, FILM COATED ORAL
Status: CANCELLED
Start: 2017-05-15

## 2017-05-15 RX ORDER — AZACITIDINE 100 MG/1
75 INJECTION, POWDER, LYOPHILIZED, FOR SOLUTION INTRAVENOUS; SUBCUTANEOUS
Status: CANCELLED | OUTPATIENT
Start: 2017-05-17

## 2017-05-15 RX ORDER — ONDANSETRON 4 MG/1
4 TABLET, FILM COATED ORAL
Status: CANCELLED
Start: 2017-05-17

## 2017-05-15 RX ORDER — ONDANSETRON 4 MG/1
4 TABLET, FILM COATED ORAL
Status: CANCELLED
Start: 2017-05-19

## 2017-05-15 RX ORDER — ONDANSETRON 4 MG/1
8 TABLET, FILM COATED ORAL ONCE
Status: CANCELLED
Start: 2017-05-17 | End: 2017-05-17

## 2017-05-15 RX ORDER — ONDANSETRON 4 MG/1
4 TABLET, FILM COATED ORAL
Status: CANCELLED
Start: 2017-05-18

## 2017-05-15 RX ORDER — ONDANSETRON 4 MG/1
8 TABLET, FILM COATED ORAL ONCE
Status: CANCELLED
Start: 2017-05-16 | End: 2017-05-16

## 2017-05-15 RX ORDER — ONDANSETRON 4 MG/1
8 TABLET, FILM COATED ORAL ONCE
Status: CANCELLED
Start: 2017-05-15 | End: 2017-05-15

## 2017-05-15 RX ORDER — ONDANSETRON HYDROCHLORIDE 8 MG/1
8 TABLET, FILM COATED ORAL ONCE
Status: COMPLETED | OUTPATIENT
Start: 2017-05-15 | End: 2017-05-15

## 2017-05-15 RX ORDER — ONDANSETRON 4 MG/1
4 TABLET, FILM COATED ORAL
Status: CANCELLED
Start: 2017-05-16

## 2017-05-15 RX ORDER — AZACITIDINE 100 MG/1
75 INJECTION, POWDER, LYOPHILIZED, FOR SOLUTION INTRAVENOUS; SUBCUTANEOUS
Status: CANCELLED | OUTPATIENT
Start: 2017-05-15

## 2017-05-15 RX ORDER — AZACITIDINE 100 MG/1
75 INJECTION, POWDER, LYOPHILIZED, FOR SOLUTION INTRAVENOUS; SUBCUTANEOUS
Status: CANCELLED | OUTPATIENT
Start: 2017-05-18

## 2017-05-15 RX ORDER — AZACITIDINE 100 MG/1
75 INJECTION, POWDER, LYOPHILIZED, FOR SOLUTION INTRAVENOUS; SUBCUTANEOUS
Status: CANCELLED | OUTPATIENT
Start: 2017-05-19

## 2017-05-15 RX ORDER — ONDANSETRON 4 MG/1
8 TABLET, FILM COATED ORAL ONCE
Status: CANCELLED
Start: 2017-05-18 | End: 2017-05-18

## 2017-05-15 RX ORDER — AZACITIDINE 100 MG/1
75 INJECTION, POWDER, LYOPHILIZED, FOR SOLUTION INTRAVENOUS; SUBCUTANEOUS
Status: COMPLETED | OUTPATIENT
Start: 2017-05-15 | End: 2017-05-15

## 2017-05-15 RX ORDER — AZACITIDINE 100 MG/1
75 INJECTION, POWDER, LYOPHILIZED, FOR SOLUTION INTRAVENOUS; SUBCUTANEOUS
Status: CANCELLED | OUTPATIENT
Start: 2017-05-16

## 2017-05-15 RX ORDER — ONDANSETRON 4 MG/1
8 TABLET, FILM COATED ORAL ONCE
Status: CANCELLED
Start: 2017-05-19 | End: 2017-05-19

## 2017-05-15 RX ADMIN — ONDANSETRON 8 MG: 8 TABLET, FILM COATED ORAL at 10:05

## 2017-05-15 RX ADMIN — AZACITIDINE 140 MG: 100 INJECTION, POWDER, LYOPHILIZED, FOR SOLUTION INTRAVENOUS; SUBCUTANEOUS at 11:05

## 2017-05-15 NOTE — MR AVS SNAPSHOT
Patient Information     Patient Name Sex Carlos Morales Jr. Male 1955      Visit Information        Provider Department Dept Phone Center    5/15/2017 10:30 AM [facility] [phone] [facility]      Patient Instructions      [facility]  [address]  [address] [address]  [phone] phone     [phone] fax  Hours of Operation: Monday- Friday 8:00am- 5:00pm  After hours phone  [phone]  Hematology / Oncology Physicians on call      Dr. Tristan Gardiner    Please call with any concerns regarding your appointment today.    FALL PREVENTION   Falls often occur due to slipping, tripping or losing your balance. Here are ways to reduce your risk of falling again.   Was there anything that caused your fall that can be fixed, removed or replaced?   Make your home safe by keeping walkways clear of objects you may trip over.   Use non-slip pads under rugs.   Do not walk in poorly lit areas.   Do not stand on chairs or wobbly ladders.   Use caution when reaching overhead or looking upward. This position can cause a loss of balance.   Be sure your shoes fit properly, have non-slip bottoms and are in good condition.   Be cautious when going up and down stairs, curbs, and when walking on uneven sidewalks.   If your balance is poor, consider using a cane or walker.   If your fall was related to alcohol use, stop or limit alcohol intake.   If your fall was related to use of sleeping medicines, talk to your doctor about this. You may need to reduce your dosage at bedtime if you awaken during the night to go to the bathroom.   To reduce the need for nighttime bathroom trips:   Avoid drinking fluids for several hours before going to bed   Empty your bladder before going to bed   Men can keep a urinal at the bedside   © 0417-6764 [employer], [address] [place] [address], [place] [address]. All rights  reserved. This information is not intended as a substitute for professional medical care. Always follow your healthcare professional's instructions.    HOME CARE AFTER CHEMOTHERAPY   Meals   Many patients feel sick and lose their appetites during treatment. Eat small meals several times a day. Choose bland foods with little taste or smell if you have problems with nausea. Be sure to cook all food thoroughly. This kills bacteria and helps you avoid intestinal infection. Soft foods are easier to swallow and digest.   Activity   Exercise keeps you strong and keeps your heart and lungs active. Talk to your doctor about an appropriate exercise program for you.   Skin Care   To prevent a skin infection, bathe or shower once a day. Use a moisturizing soap and wash with warm water. Avoid very hot or cold water. Chemotherapy can make your skin dry . Apply moisturizing lotion to help relieve dry skin. Some drugs used in high doses can cause slight burns to appear (like sunburn). Ask for a special cream to help relieve the burn and protect your skin.   Prevent Mouth Sores   During chemotherapy, many people get mouth sores. Do the following to help prevent mouth sores or to ease discomfort.   Brush your teeth with a soft-bristle toothbrush after every meal.  Don't use dental floss if your platelet count is below 50,000. Your doctor or nurse will tell you if this is the case.  Use an oral swab or special soft toothbrush if your gums bleed during regular brushing.  Use mouthwash as directed. If you can't tolerate commercial mouthwash, use salt and baking soda to clean your mouth. Mix 1 teaspoon of salt and 1 teaspoon of baking soda into a glass of water. Swish and spit.  Call your doctor or return to this facility if you develop any of the following:   Sore throat   White patches in the mouth or throat   Fever of 100.4ºF (38ºC) or higher, or as directed by your healthcare provider  © 8925-7043 [employer], [address] [place] [address]  Road, [place] [address]. All rights reserved. This information is not intended as a substitute for professional medical care. Always follow your healthcare professional's     YOU HAVE STARTED ON CHEMOTHERAPY. IF YOU EXPERIENCE ANY OF THE FOLLOWING PROBLEMS, CALL THE OFFICE IMMEDIATELY.    *FEVER .0 OR GREATER    *CHILLS, ESPECIALLY SHAKING CHILLS, OR SWEATING    *A SEVERE COUGH OR SORE THROAT, OR SINUS PAIN/     PRESSURE    *REDNESS, SWELLING, OR TENDERNESS AROUND A WOUND,     SORE, PIMPLE, RECTAL AREA, OR IV SITE    *SORES OR ULCERS IN THE MOUTH    *BLISTERS ON THE LIPS OR SKIN    *FREQUENT URGENCY TO URINATE OR A BURNING FEELING   WHEN YOU URINATE    *BLOOD IN THE URINE OR STOOL    *ANY UNEXPLAINED BRUISING OR PROLONGED BLEEDING,     (NOSEBLEEDS OR BLEEDING GUMS)    *LOOSE BOWEL MOVEMENTS THAT DO NOT RESPOND TO     IMODIUM OR MORE THAN THREE TIMES A DAY    *VOMITING UNRESPONSIVE TO ANTINAUSEA MEDICINE    *ANY UNUSUAL PHYSICAL SYMPTOMS THAT BEGAN AFTER     CHEMOTHERAPY    DURING WEEKDAYS, CALL AND ASK TO SPEAK DIRECTLY TO A NURSE.  AT OTHER TIMES, CALL THE OFFICE PHONE NUMBER; THE ANSWERING SERVICE WILL CONTACT THE ON-CALL PHYSICIAN.  SOMEONE IS AVAILABLE 24 HOURS A DAY, SEVEN DAYS A WEEK.       Your Current Medications Are     ondansetron (ZOFRAN) 8 MG tablet    valacyclovir (VALTREX) 500 MG tablet      Facility-Administered Medications     azacitidine injection 140 mg    ondansetron tablet 8 mg      Appointments for Next Year     5/16/2017 10:30 AM INFUSION 030 MIN (30 min.) [facility] 03 [facility]    Arrive at check-in approximately 15 minutes before your scheduled appointment time. Bring all outside medical records and imaging, along with a list of your current medications and insurance card.    (off [facility]) 1st floor    5/17/2017 10:30 AM INFUSION 030 MIN (30 min.) [facility] CHAIR 05 ON[facility]    Arrive at check-in approximately 15 minutes before your scheduled  "appointment time. Bring all outside medical records and imaging, along with a list of your current medications and insurance card.    (off [facility]) 1st floor    5/18/2017 10:30 AM INFUSION 030 MIN (30 min.) [facility] 03 [facility]    Arrive at check-in approximately 15 minutes before your scheduled appointment time. Bring all outside medical records and imaging, along with a list of your current medications and insurance card.    (off [facility]) 1st floor    5/19/2017 10:30 AM INFUSION 030 MIN (30 min.) [facility] CHAIR 05 ON[facility]    Arrive at check-in approximately 15 minutes before your scheduled appointment time. Bring all outside medical records and imaging, along with a list of your current medications and insurance card.    (off [facility]) 1st floor    5/29/2017 10:30 AM NON FASTING LAB (10 min.) [facility], [facility]    Arrive at check-in approximately 15 minutes before your scheduled appointment time. Bring all outside medical records and imaging, along with a list of your current medications and insurance card.    5/29/2017 11:00 AM ESTABLISHED PATIENT (20 min.) [facility] - Hematology Oncology Lavell Flor MD    Arrive at check-in approximately 15 minutes before your scheduled appointment time. Bring all outside medical records and imaging, along with a list of your current medications and insurance card.    (off [facility]) 1st Floor Appointments with Luly Shashi - 2nd Floor         Default Flowsheet Data (last 24 hours)      Amb Complex Vitals Rony        05/15/17 0954 05/15/17 0919             Measurements    Weight 73.1 kg (161 lb 2.5 oz) 73.1 kg (161 lb 2.5 oz)       Height 5' 8" (1.727 m) 5' 8" (1.727 m)       BSA (Calculated - sq m) 1.87 sq meters 1.87 sq meters       BMI (Calculated) 24.6 24.6       BP  104/60       Temp  98.6 °F (37 °C)   drinking coffee       Pulse  85       Resp  16       SpO2  98 %       Pain Assessment    Pain Score " Zero Zero               Allergies     No Known Allergies      Medications You Received from 05/14/2017 1122 to 05/15/2017 1122        Date/Time Order Dose Route Action     05/15/2017 1013 ondansetron tablet 8 mg 8 mg Oral Given      Current Discharge Medication List     Cannot display discharge medications since this is not an admission.

## 2017-05-15 NOTE — PLAN OF CARE
Problem: Patient Care Overview  Goal: Plan of Care Review  Outcome: Ongoing (interventions implemented as appropriate)  Patient states that he is feeling good today.

## 2017-05-15 NOTE — PATIENT INSTRUCTIONS
[facility]  [address]  [address] [address]  [phone] phone     [phone] fax  Hours of Operation: Monday- Friday 8:00am- 5:00pm  After hours phone  [phone]  Hematology / Oncology Physicians on call      Dr. Tristan Gardiner    Please call with any concerns regarding your appointment today.    FALL PREVENTION   Falls often occur due to slipping, tripping or losing your balance. Here are ways to reduce your risk of falling again.   Was there anything that caused your fall that can be fixed, removed or replaced?   Make your home safe by keeping walkways clear of objects you may trip over.   Use non-slip pads under rugs.   Do not walk in poorly lit areas.   Do not stand on chairs or wobbly ladders.   Use caution when reaching overhead or looking upward. This position can cause a loss of balance.   Be sure your shoes fit properly, have non-slip bottoms and are in good condition.   Be cautious when going up and down stairs, curbs, and when walking on uneven sidewalks.   If your balance is poor, consider using a cane or walker.   If your fall was related to alcohol use, stop or limit alcohol intake.   If your fall was related to use of sleeping medicines, talk to your doctor about this. You may need to reduce your dosage at bedtime if you awaken during the night to go to the bathroom.   To reduce the need for nighttime bathroom trips:   Avoid drinking fluids for several hours before going to bed   Empty your bladder before going to bed   Men can keep a urinal at the bedside   © 5524-5027 [employer], [address] [place] [address]. All rights reserved. This information is not intended as a substitute for professional medical care. Always follow your healthcare professional's instructions.    HOME CARE AFTER CHEMOTHERAPY   Meals   Many patients feel sick and lose their appetites during treatment. Eat small meals  several times a day. Choose bland foods with little taste or smell if you have problems with nausea. Be sure to cook all food thoroughly. This kills bacteria and helps you avoid intestinal infection. Soft foods are easier to swallow and digest.   Activity   Exercise keeps you strong and keeps your heart and lungs active. Talk to your doctor about an appropriate exercise program for you.   Skin Care   To prevent a skin infection, bathe or shower once a day. Use a moisturizing soap and wash with warm water. Avoid very hot or cold water. Chemotherapy can make your skin dry . Apply moisturizing lotion to help relieve dry skin. Some drugs used in high doses can cause slight burns to appear (like sunburn). Ask for a special cream to help relieve the burn and protect your skin.   Prevent Mouth Sores   During chemotherapy, many people get mouth sores. Do the following to help prevent mouth sores or to ease discomfort.   Brush your teeth with a soft-bristle toothbrush after every meal.  Don't use dental floss if your platelet count is below 50,000. Your doctor or nurse will tell you if this is the case.  Use an oral swab or special soft toothbrush if your gums bleed during regular brushing.  Use mouthwash as directed. If you can't tolerate commercial mouthwash, use salt and baking soda to clean your mouth. Mix 1 teaspoon of salt and 1 teaspoon of baking soda into a glass of water. Swish and spit.  Call your doctor or return to this facility if you develop any of the following:   Sore throat   White patches in the mouth or throat   Fever of 100.4ºF (38ºC) or higher, or as directed by your healthcare provider  © 0724-7590 [employer], [address] [place] [address], [place] [address]. All rights reserved. This information is not intended as a substitute for professional medical care. Always follow your healthcare professional's     YOU HAVE STARTED ON CHEMOTHERAPY. IF YOU EXPERIENCE ANY OF THE FOLLOWING PROBLEMS, CALL THE OFFICE  IMMEDIATELY.    *FEVER .0 OR GREATER    *CHILLS, ESPECIALLY SHAKING CHILLS, OR SWEATING    *A SEVERE COUGH OR SORE THROAT, OR SINUS PAIN/     PRESSURE    *REDNESS, SWELLING, OR TENDERNESS AROUND A WOUND,     SORE, PIMPLE, RECTAL AREA, OR IV SITE    *SORES OR ULCERS IN THE MOUTH    *BLISTERS ON THE LIPS OR SKIN    *FREQUENT URGENCY TO URINATE OR A BURNING FEELING   WHEN YOU URINATE    *BLOOD IN THE URINE OR STOOL    *ANY UNEXPLAINED BRUISING OR PROLONGED BLEEDING,     (NOSEBLEEDS OR BLEEDING GUMS)    *LOOSE BOWEL MOVEMENTS THAT DO NOT RESPOND TO     IMODIUM OR MORE THAN THREE TIMES A DAY    *VOMITING UNRESPONSIVE TO ANTINAUSEA MEDICINE    *ANY UNUSUAL PHYSICAL SYMPTOMS THAT BEGAN AFTER     CHEMOTHERAPY    DURING WEEKDAYS, CALL AND ASK TO SPEAK DIRECTLY TO A NURSE.  AT OTHER TIMES, CALL THE OFFICE PHONE NUMBER; THE ANSWERING SERVICE WILL CONTACT THE ON-CALL PHYSICIAN.  SOMEONE IS AVAILABLE 24 HOURS A DAY, SEVEN DAYS A WEEK.

## 2017-05-15 NOTE — PROGRESS NOTES
Subjective:       Patient ID: Carlos Jordan Jr. is a 62 y.o. male.    Chief Complaint: Follow-up    HPI  This is a 62 year-old gentleman who comes for follow up of his AML.  He was found to have pancytopenia during a routine cbc done at the ER because of other complains.  A bone marrow was done on 11/4/2016. 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  He said he was not interested in discussing a stem cell transplant .  He said he would prefer to be treated with a low toxicity agent like Vidaza which would allow him to be treated as an outpatient.  He came in for C6   d1  of f VIDAZA  He says he feels well.  ALLERGIES: None.      MEDICATIONS: None.      PREVIOUS SURGERIES: Amputation of the tip of the right middle finger.      SOCIAL HISTORY: , has one child. He lives in [place]. He smokes half a  pack a day and has been doing this for 35 years. Denies significant drinking.   He is retired.      FAMILY HISTORY: Brother had brain tumor. Father had diabetes. No heart   attacks.      PAST MEDICAL HISTORY:  1. AML  2. Tobacco use.    3-thrombocytopenia  Review of Systems   Constitutional: Negative.  Negative for appetite change, fatigue and unexpected weight change.   Eyes: Negative.  Negative for visual disturbance.   Respiratory: Negative for cough and shortness of breath.    Cardiovascular: Negative.  Negative for chest pain.   Gastrointestinal: Negative for abdominal pain, diarrhea and nausea.   Genitourinary: Negative.  Negative for frequency and hematuria.   Musculoskeletal: Negative.  Negative for back pain.   Skin: Negative.  Negative for rash.   Neurological: Negative.  Negative for headaches.   Hematological: Negative for adenopathy.   Psychiatric/Behavioral: Negative.  The patient is  not nervous/anxious.        Objective:      Physical Exam   Constitutional: He is oriented to person, place, and time. He appears well-developed and well-nourished.   HENT:   Head: Normocephalic.   Mouth/Throat: No oropharyngeal exudate.   Poor dentition   Eyes: Pupils are equal, round, and reactive to light.   Neck: No thyromegaly present.   Cardiovascular: Normal rate, regular rhythm and normal heart sounds.  Exam reveals no gallop.    No murmur heard.  Pulmonary/Chest: No respiratory distress. He has no wheezes. He has no rales.   Abdominal: Soft. Bowel sounds are normal. He exhibits no distension and no mass. There is no rebound and no guarding.   Musculoskeletal: Normal range of motion. He exhibits no edema.   Lymphadenopathy:     He has no cervical adenopathy.   Neurological: He is alert and oriented to person, place, and time.   Skin: Skin is warm and dry.   Psychiatric: He has a normal mood and affect. His behavior is normal.       Wt Readings from Last 3 Encounters:   05/15/17 73.1 kg (161 lb 2.5 oz)   04/25/17 73.5 kg (162 lb 0.6 oz)   04/21/17 73.6 kg (162 lb 4.1 oz)     Temp Readings from Last 3 Encounters:   05/15/17 98.6 °F (37 °C) (Oral)   04/25/17 98.2 °F (36.8 °C) (Oral)   04/21/17 98.6 °F (37 °C)     BP Readings from Last 3 Encounters:   05/15/17 104/60   04/25/17 (!) 146/80   04/21/17 133/79     Pulse Readings from Last 3 Encounters:   05/15/17 85   04/25/17 84   04/21/17 88           1. Acute myeloid leukemia not having achieved remission        Plan:       Lab Results   Component Value Date    WBC 1.51 (LL) 05/15/2017    HGB 11.8 (L) 05/15/2017    HCT 34.2 (L) 05/15/2017     (H) 05/15/2017    PLT 92 (L) 05/15/2017       He will start c6 of Vidaza. He will see me in 2 weeks with a cbc  Bone marrow planned at the end of this cycle.

## 2017-05-16 ENCOUNTER — INFUSION (OUTPATIENT)
Dept: INFUSION THERAPY | Facility: HOSPITAL | Age: 62
End: 2017-05-16
Attending: INTERNAL MEDICINE
Payer: MEDICARE

## 2017-05-16 VITALS
HEIGHT: 68 IN | DIASTOLIC BLOOD PRESSURE: 66 MMHG | WEIGHT: 161.19 LBS | SYSTOLIC BLOOD PRESSURE: 107 MMHG | TEMPERATURE: 98 F | HEART RATE: 88 BPM | BODY MASS INDEX: 24.43 KG/M2

## 2017-05-16 DIAGNOSIS — C92.02 ACUTE MYELOID LEUKEMIA IN RELAPSE: ICD-10-CM

## 2017-05-16 DIAGNOSIS — C92.00 ACUTE MYELOID LEUKEMIA NOT HAVING ACHIEVED REMISSION: Primary | ICD-10-CM

## 2017-05-16 PROCEDURE — 96401 CHEMO ANTI-NEOPL SQ/IM: CPT

## 2017-05-16 PROCEDURE — 63600175 PHARM REV CODE 636 W HCPCS: Performed by: INTERNAL MEDICINE

## 2017-05-16 PROCEDURE — 25000003 PHARM REV CODE 250: Performed by: INTERNAL MEDICINE

## 2017-05-16 RX ORDER — ONDANSETRON HYDROCHLORIDE 8 MG/1
8 TABLET, FILM COATED ORAL ONCE
Status: COMPLETED | OUTPATIENT
Start: 2017-05-16 | End: 2017-05-16

## 2017-05-16 RX ORDER — AZACITIDINE 100 MG/1
75 INJECTION, POWDER, LYOPHILIZED, FOR SOLUTION INTRAVENOUS; SUBCUTANEOUS
Status: COMPLETED | OUTPATIENT
Start: 2017-05-16 | End: 2017-05-16

## 2017-05-16 RX ADMIN — ONDANSETRON 8 MG: 8 TABLET, FILM COATED ORAL at 10:05

## 2017-05-16 RX ADMIN — AZACITIDINE 140 MG: 100 INJECTION, POWDER, LYOPHILIZED, FOR SOLUTION INTRAVENOUS; SUBCUTANEOUS at 11:05

## 2017-05-17 ENCOUNTER — INFUSION (OUTPATIENT)
Dept: INFUSION THERAPY | Facility: HOSPITAL | Age: 62
End: 2017-05-17
Attending: INTERNAL MEDICINE
Payer: MEDICARE

## 2017-05-17 VITALS
TEMPERATURE: 98 F | BODY MASS INDEX: 24.43 KG/M2 | HEART RATE: 79 BPM | DIASTOLIC BLOOD PRESSURE: 72 MMHG | HEIGHT: 68 IN | SYSTOLIC BLOOD PRESSURE: 124 MMHG | WEIGHT: 161.19 LBS | RESPIRATION RATE: 18 BRPM

## 2017-05-17 DIAGNOSIS — C92.02 ACUTE MYELOID LEUKEMIA IN RELAPSE: ICD-10-CM

## 2017-05-17 DIAGNOSIS — C92.00 ACUTE MYELOID LEUKEMIA NOT HAVING ACHIEVED REMISSION: Primary | ICD-10-CM

## 2017-05-17 PROCEDURE — 96401 CHEMO ANTI-NEOPL SQ/IM: CPT

## 2017-05-17 PROCEDURE — 63600175 PHARM REV CODE 636 W HCPCS: Performed by: INTERNAL MEDICINE

## 2017-05-17 PROCEDURE — 25000003 PHARM REV CODE 250: Performed by: INTERNAL MEDICINE

## 2017-05-17 RX ORDER — AZACITIDINE 100 MG/1
75 INJECTION, POWDER, LYOPHILIZED, FOR SOLUTION INTRAVENOUS; SUBCUTANEOUS
Status: COMPLETED | OUTPATIENT
Start: 2017-05-17 | End: 2017-05-17

## 2017-05-17 RX ORDER — ONDANSETRON HYDROCHLORIDE 8 MG/1
8 TABLET, FILM COATED ORAL ONCE
Status: COMPLETED | OUTPATIENT
Start: 2017-05-17 | End: 2017-05-17

## 2017-05-17 RX ADMIN — ONDANSETRON 8 MG: 8 TABLET, FILM COATED ORAL at 10:05

## 2017-05-17 RX ADMIN — AZACITIDINE 140 MG: 100 INJECTION, POWDER, LYOPHILIZED, FOR SOLUTION INTRAVENOUS; SUBCUTANEOUS at 11:05

## 2017-05-17 NOTE — PLAN OF CARE
Problem: Patient Care Overview  Goal: Plan of Care Review  Outcome: Ongoing (interventions implemented as appropriate)  Pt states that he is feeling better today than he has been.

## 2017-05-17 NOTE — MR AVS SNAPSHOT
Patient Information     Patient Name Sex Carlos Morales Jr. Male 1955      Visit Information        Provider Department Dept Phone Center    2017 10:30 AM [facility] On [facility]otherapy Infusion [phone] [facility]      Patient Instructions      [facility]  [address]  [address] [address]  [phone] phone     [phone] fax  Hours of Operation: Monday- Friday 8:00am- 5:00pm  After hours phone  [phone]  Hematology / Oncology Physicians on call      Dr. Tristan Gardiner    Please call with any concerns regarding your appointment today.    FALL PREVENTION   Falls often occur due to slipping, tripping or losing your balance. Here are ways to reduce your risk of falling again.   Was there anything that caused your fall that can be fixed, removed or replaced?   Make your home safe by keeping walkways clear of objects you may trip over.   Use non-slip pads under rugs.   Do not walk in poorly lit areas.   Do not stand on chairs or wobbly ladders.   Use caution when reaching overhead or looking upward. This position can cause a loss of balance.   Be sure your shoes fit properly, have non-slip bottoms and are in good condition.   Be cautious when going up and down stairs, curbs, and when walking on uneven sidewalks.   If your balance is poor, consider using a cane or walker.   If your fall was related to alcohol use, stop or limit alcohol intake.   If your fall was related to use of sleeping medicines, talk to your doctor about this. You may need to reduce your dosage at bedtime if you awaken during the night to go to the bathroom.   To reduce the need for nighttime bathroom trips:   Avoid drinking fluids for several hours before going to bed   Empty your bladder before going to bed   Men can keep a urinal at the bedside   © 4072-7559 [employer], [address] [place] [address], [place] [address]. All rights  reserved. This information is not intended as a substitute for professional medical care. Always follow your healthcare professional's instructions.    HOME CARE AFTER CHEMOTHERAPY   Meals   Many patients feel sick and lose their appetites during treatment. Eat small meals several times a day. Choose bland foods with little taste or smell if you have problems with nausea. Be sure to cook all food thoroughly. This kills bacteria and helps you avoid intestinal infection. Soft foods are easier to swallow and digest.   Activity   Exercise keeps you strong and keeps your heart and lungs active. Talk to your doctor about an appropriate exercise program for you.   Skin Care   To prevent a skin infection, bathe or shower once a day. Use a moisturizing soap and wash with warm water. Avoid very hot or cold water. Chemotherapy can make your skin dry . Apply moisturizing lotion to help relieve dry skin. Some drugs used in high doses can cause slight burns to appear (like sunburn). Ask for a special cream to help relieve the burn and protect your skin.   Prevent Mouth Sores   During chemotherapy, many people get mouth sores. Do the following to help prevent mouth sores or to ease discomfort.   Brush your teeth with a soft-bristle toothbrush after every meal.  Don't use dental floss if your platelet count is below 50,000. Your doctor or nurse will tell you if this is the case.  Use an oral swab or special soft toothbrush if your gums bleed during regular brushing.  Use mouthwash as directed. If you can't tolerate commercial mouthwash, use salt and baking soda to clean your mouth. Mix 1 teaspoon of salt and 1 teaspoon of baking soda into a glass of water. Swish and spit.  Call your doctor or return to this facility if you develop any of the following:   Sore throat   White patches in the mouth or throat   Fever of 100.4ºF (38ºC) or higher, or as directed by your healthcare provider  © 7104-9935 [employer], [address] [place] [address]  Road, [place] [address]. All rights reserved. This information is not intended as a substitute for professional medical care. Always follow your healthcare professional's     YOU HAVE STARTED ON CHEMOTHERAPY. IF YOU EXPERIENCE ANY OF THE FOLLOWING PROBLEMS, CALL THE OFFICE IMMEDIATELY.    *FEVER .0 OR GREATER    *CHILLS, ESPECIALLY SHAKING CHILLS, OR SWEATING    *A SEVERE COUGH OR SORE THROAT, OR SINUS PAIN/     PRESSURE    *REDNESS, SWELLING, OR TENDERNESS AROUND A WOUND,     SORE, PIMPLE, RECTAL AREA, OR IV SITE    *SORES OR ULCERS IN THE MOUTH    *BLISTERS ON THE LIPS OR SKIN    *FREQUENT URGENCY TO URINATE OR A BURNING FEELING   WHEN YOU URINATE    *BLOOD IN THE URINE OR STOOL    *ANY UNEXPLAINED BRUISING OR PROLONGED BLEEDING,     (NOSEBLEEDS OR BLEEDING GUMS)    *LOOSE BOWEL MOVEMENTS THAT DO NOT RESPOND TO     IMODIUM OR MORE THAN THREE TIMES A DAY    *VOMITING UNRESPONSIVE TO ANTINAUSEA MEDICINE    *ANY UNUSUAL PHYSICAL SYMPTOMS THAT BEGAN AFTER     CHEMOTHERAPY    DURING WEEKDAYS, CALL AND ASK TO SPEAK DIRECTLY TO A NURSE.  AT OTHER TIMES, CALL THE OFFICE PHONE NUMBER; THE ANSWERING SERVICE WILL CONTACT THE ON-CALL PHYSICIAN.  SOMEONE IS AVAILABLE 24 HOURS A DAY, SEVEN DAYS A WEEK.       Your Current Medications Are     ondansetron (ZOFRAN) 8 MG tablet    valacyclovir (VALTREX) 500 MG tablet      Facility-Administered Medications     azacitidine injection 140 mg    azacitidine injection 140 mg    ondansetron tablet 8 mg      Appointments for Next Year     5/18/2017 10:30 AM INFUSION 030 MIN (30 min.) [facility] 03 [facility]    Arrive at check-in approximately 15 minutes before your scheduled appointment time. Bring all outside medical records and imaging, along with a list of your current medications and insurance card.    (off [facility]) 1st floor    5/19/2017 10:30 AM INFUSION 030 MIN (30 min.) [facility] CHAIR 05 ON[facility]    Arrive at check-in approximately 15  "minutes before your scheduled appointment time. Bring all outside medical records and imaging, along with a list of your current medications and insurance card.    (off [facility]) 1st floor    5/29/2017 10:30 AM NON FASTING LAB (10 min.) [facility], [facility]    Arrive at check-in approximately 15 minutes before your scheduled appointment time. Bring all outside medical records and imaging, along with a list of your current medications and insurance card.    5/29/2017 11:00 AM ESTABLISHED PATIENT (20 min.) [facility] - Hematology Oncology Lavell Flor MD    Arrive at check-in approximately 15 minutes before your scheduled appointment time. Bring all outside medical records and imaging, along with a list of your current medications and insurance card.    (off [facility]) 1st Floor [facility] - 2nd Floor         Default Flowsheet Data (last 24 hours)      Amb Complex Vitals Rony        05/17/17 1015                Measurements    Weight 73.1 kg (161 lb 2.5 oz)        Height 5' 8" (1.727 m)        BSA (Calculated - sq m) 1.87 sq meters        BMI (Calculated) 24.6        /72        Temp 97.8 °F (36.6 °C)        Pulse 79        Resp 18        Pain Assessment    Pain Score Zero                Allergies     No Known Allergies      Medications You Received from 05/16/2017 1108 to 05/17/2017 1108        Date/Time Order Dose Route Action     05/17/2017 1102 azacitidine injection 140 mg 140 mg Subcutaneous Given     05/17/2017 1024 ondansetron tablet 8 mg 8 mg Oral Given      Current Discharge Medication List     Cannot display discharge medications since this is not an admission.      "

## 2017-05-17 NOTE — PATIENT INSTRUCTIONS
[facility]  [address]  [address] [address]  [phone] phone     [phone] fax  Hours of Operation: Monday- Friday 8:00am- 5:00pm  After hours phone  [phone]  Hematology / Oncology Physicians on call      Dr. Tristan Gardiner    Please call with any concerns regarding your appointment today.    FALL PREVENTION   Falls often occur due to slipping, tripping or losing your balance. Here are ways to reduce your risk of falling again.   Was there anything that caused your fall that can be fixed, removed or replaced?   Make your home safe by keeping walkways clear of objects you may trip over.   Use non-slip pads under rugs.   Do not walk in poorly lit areas.   Do not stand on chairs or wobbly ladders.   Use caution when reaching overhead or looking upward. This position can cause a loss of balance.   Be sure your shoes fit properly, have non-slip bottoms and are in good condition.   Be cautious when going up and down stairs, curbs, and when walking on uneven sidewalks.   If your balance is poor, consider using a cane or walker.   If your fall was related to alcohol use, stop or limit alcohol intake.   If your fall was related to use of sleeping medicines, talk to your doctor about this. You may need to reduce your dosage at bedtime if you awaken during the night to go to the bathroom.   To reduce the need for nighttime bathroom trips:   Avoid drinking fluids for several hours before going to bed   Empty your bladder before going to bed   Men can keep a urinal at the bedside   © 4673-1966 [employer], [address] [place] [address]. All rights reserved. This information is not intended as a substitute for professional medical care. Always follow your healthcare professional's instructions.    HOME CARE AFTER CHEMOTHERAPY   Meals   Many patients feel sick and lose their appetites during treatment. Eat small meals  several times a day. Choose bland foods with little taste or smell if you have problems with nausea. Be sure to cook all food thoroughly. This kills bacteria and helps you avoid intestinal infection. Soft foods are easier to swallow and digest.   Activity   Exercise keeps you strong and keeps your heart and lungs active. Talk to your doctor about an appropriate exercise program for you.   Skin Care   To prevent a skin infection, bathe or shower once a day. Use a moisturizing soap and wash with warm water. Avoid very hot or cold water. Chemotherapy can make your skin dry . Apply moisturizing lotion to help relieve dry skin. Some drugs used in high doses can cause slight burns to appear (like sunburn). Ask for a special cream to help relieve the burn and protect your skin.   Prevent Mouth Sores   During chemotherapy, many people get mouth sores. Do the following to help prevent mouth sores or to ease discomfort.   Brush your teeth with a soft-bristle toothbrush after every meal.  Don't use dental floss if your platelet count is below 50,000. Your doctor or nurse will tell you if this is the case.  Use an oral swab or special soft toothbrush if your gums bleed during regular brushing.  Use mouthwash as directed. If you can't tolerate commercial mouthwash, use salt and baking soda to clean your mouth. Mix 1 teaspoon of salt and 1 teaspoon of baking soda into a glass of water. Swish and spit.  Call your doctor or return to this facility if you develop any of the following:   Sore throat   White patches in the mouth or throat   Fever of 100.4ºF (38ºC) or higher, or as directed by your healthcare provider  © 9215-8684 [employer], [address] [place] [address], [place] [address]. All rights reserved. This information is not intended as a substitute for professional medical care. Always follow your healthcare professional's     YOU HAVE STARTED ON CHEMOTHERAPY. IF YOU EXPERIENCE ANY OF THE FOLLOWING PROBLEMS, CALL THE OFFICE  IMMEDIATELY.    *FEVER .0 OR GREATER    *CHILLS, ESPECIALLY SHAKING CHILLS, OR SWEATING    *A SEVERE COUGH OR SORE THROAT, OR SINUS PAIN/     PRESSURE    *REDNESS, SWELLING, OR TENDERNESS AROUND A WOUND,     SORE, PIMPLE, RECTAL AREA, OR IV SITE    *SORES OR ULCERS IN THE MOUTH    *BLISTERS ON THE LIPS OR SKIN    *FREQUENT URGENCY TO URINATE OR A BURNING FEELING   WHEN YOU URINATE    *BLOOD IN THE URINE OR STOOL    *ANY UNEXPLAINED BRUISING OR PROLONGED BLEEDING,     (NOSEBLEEDS OR BLEEDING GUMS)    *LOOSE BOWEL MOVEMENTS THAT DO NOT RESPOND TO     IMODIUM OR MORE THAN THREE TIMES A DAY    *VOMITING UNRESPONSIVE TO ANTINAUSEA MEDICINE    *ANY UNUSUAL PHYSICAL SYMPTOMS THAT BEGAN AFTER     CHEMOTHERAPY    DURING WEEKDAYS, CALL AND ASK TO SPEAK DIRECTLY TO A NURSE.  AT OTHER TIMES, CALL THE OFFICE PHONE NUMBER; THE ANSWERING SERVICE WILL CONTACT THE ON-CALL PHYSICIAN.  SOMEONE IS AVAILABLE 24 HOURS A DAY, SEVEN DAYS A WEEK.

## 2017-05-18 ENCOUNTER — INFUSION (OUTPATIENT)
Dept: INFUSION THERAPY | Facility: HOSPITAL | Age: 62
End: 2017-05-18
Attending: INTERNAL MEDICINE
Payer: MEDICARE

## 2017-05-18 ENCOUNTER — OFFICE VISIT (OUTPATIENT)
Dept: HEMATOLOGY/ONCOLOGY | Facility: CLINIC | Age: 62
End: 2017-05-18
Payer: MEDICARE

## 2017-05-18 VITALS
DIASTOLIC BLOOD PRESSURE: 69 MMHG | BODY MASS INDEX: 24.43 KG/M2 | TEMPERATURE: 98 F | HEIGHT: 68 IN | HEART RATE: 76 BPM | WEIGHT: 161.19 LBS | DIASTOLIC BLOOD PRESSURE: 70 MMHG | SYSTOLIC BLOOD PRESSURE: 127 MMHG | HEIGHT: 68 IN | SYSTOLIC BLOOD PRESSURE: 138 MMHG | WEIGHT: 161.19 LBS | RESPIRATION RATE: 18 BRPM | TEMPERATURE: 98 F | BODY MASS INDEX: 24.43 KG/M2 | HEART RATE: 80 BPM | OXYGEN SATURATION: 96 %

## 2017-05-18 DIAGNOSIS — K08.89 PAIN, DENTAL: ICD-10-CM

## 2017-05-18 DIAGNOSIS — C92.00 ACUTE MYELOID LEUKEMIA NOT HAVING ACHIEVED REMISSION: Primary | ICD-10-CM

## 2017-05-18 DIAGNOSIS — C92.02 ACUTE MYELOID LEUKEMIA IN RELAPSE: ICD-10-CM

## 2017-05-18 DIAGNOSIS — D69.6 THROMBOCYTOPENIA: Primary | ICD-10-CM

## 2017-05-18 PROCEDURE — 63600175 PHARM REV CODE 636 W HCPCS: Performed by: INTERNAL MEDICINE

## 2017-05-18 PROCEDURE — 99999 PR PBB SHADOW E&M-EST. PATIENT-LVL III: CPT | Mod: PBBFAC,,, | Performed by: INTERNAL MEDICINE

## 2017-05-18 PROCEDURE — 1160F RVW MEDS BY RX/DR IN RCRD: CPT | Mod: S$GLB,,, | Performed by: INTERNAL MEDICINE

## 2017-05-18 PROCEDURE — 25000003 PHARM REV CODE 250: Performed by: INTERNAL MEDICINE

## 2017-05-18 PROCEDURE — 96401 CHEMO ANTI-NEOPL SQ/IM: CPT

## 2017-05-18 PROCEDURE — 99213 OFFICE O/P EST LOW 20 MIN: CPT | Mod: S$GLB,,, | Performed by: INTERNAL MEDICINE

## 2017-05-18 RX ORDER — AZACITIDINE 100 MG/1
75 INJECTION, POWDER, LYOPHILIZED, FOR SOLUTION INTRAVENOUS; SUBCUTANEOUS
Status: COMPLETED | OUTPATIENT
Start: 2017-05-18 | End: 2017-05-18

## 2017-05-18 RX ORDER — ONDANSETRON HYDROCHLORIDE 8 MG/1
8 TABLET, FILM COATED ORAL ONCE
Status: COMPLETED | OUTPATIENT
Start: 2017-05-18 | End: 2017-05-18

## 2017-05-18 RX ADMIN — AZACITIDINE 140 MG: 100 INJECTION, POWDER, LYOPHILIZED, FOR SOLUTION INTRAVENOUS; SUBCUTANEOUS at 11:05

## 2017-05-18 RX ADMIN — ONDANSETRON 8 MG: 8 TABLET, FILM COATED ORAL at 10:05

## 2017-05-18 NOTE — NURSING
Pt reports tooth pain to her 1 upper front tooth.  States that it is a 1/10 and that it is loose.  Upon assessment the tooth is discolored.  Dr. montenegro notified and orders taken to schedule him to see the provider.

## 2017-05-18 NOTE — PLAN OF CARE
Problem: Patient Care Overview  Goal: Plan of Care Review  Outcome: Ongoing (interventions implemented as appropriate)  Patient states that he feels good but that he is having tooth pain and it is loose.

## 2017-05-18 NOTE — PROGRESS NOTES
Subjective:       Patient ID: Carlos Jordan Jr. is a 62 y.o. male.    Chief Complaint: No chief complaint on file.    HPI This is a 62 year-old gentleman who comes for follow up of his AML.  He was found to have pancytopenia during a routine cbc done at the ER because of other complains.  A bone marrow was done on 11/4/2016. 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  He said he was not interested in discussing a stem cell transplant .  He said he would prefer to be treated with a low toxicity agent like Vidaza which would allow him to be treated as an outpatient.  He came in for C6   d2  of f VIDAZA  He has pain in the solitary incisive tooth he has in the upper dental arcade. It is very loose and seems it is about to come off ALLERGIES: None.      MEDICATIONS: None.      PREVIOUS SURGERIES: Amputation of the tip of the right middle finger.      SOCIAL HISTORY: , has one child. He lives in [place]. He smokes half a  pack a day and has been doing this for 35 years. Denies significant drinking.   He is retired.      FAMILY HISTORY: Brother had brain tumor. Father had diabetes. No heart   attacks.      PAST MEDICAL HISTORY:  1. AML  2. Tobacco use.    3-thrombocytopenia  Review of Systems   Constitutional: Negative.  Negative for fatigue.   HENT:        Pain in tooth   Eyes: Negative.    Cardiovascular: Negative.  Negative for chest pain.   Gastrointestinal: Negative for abdominal pain and nausea.   Genitourinary: Negative.  Negative for hematuria.   Musculoskeletal: Negative.    Skin: Negative.    Neurological: Negative.    Psychiatric/Behavioral: Negative.        Objective:      Physical Exam   Constitutional: He is oriented to person, place, and time. He appears well-developed and  well-nourished.   HENT:   Head: Normocephalic.   Mouth/Throat: No oropharyngeal exudate.   Solitary tooth upper incisive, very loose   Eyes: Pupils are equal, round, and reactive to light.   Neck: No thyromegaly present.   Cardiovascular: Normal rate, regular rhythm and normal heart sounds.  Exam reveals no gallop.    No murmur heard.  Pulmonary/Chest: No respiratory distress. He has no wheezes. He has no rales.   Abdominal: Soft. Bowel sounds are normal. He exhibits no distension and no mass. There is no rebound and no guarding.   Musculoskeletal: Normal range of motion. He exhibits no edema.   Lymphadenopathy:     He has no cervical adenopathy.   Neurological: He is alert and oriented to person, place, and time.   Skin: Skin is warm and dry.   Psychiatric: He has a normal mood and affect. His behavior is normal.       Wt Readings from Last 3 Encounters:   05/18/17 73.1 kg (161 lb 2.5 oz)   05/18/17 73.1 kg (161 lb 2.5 oz)   05/17/17 73.1 kg (161 lb 2.5 oz)     Temp Readings from Last 3 Encounters:   05/18/17 98.4 °F (36.9 °C)   05/18/17 97.7 °F (36.5 °C) (Oral)   05/17/17 97.8 °F (36.6 °C)     BP Readings from Last 3 Encounters:   05/18/17 127/69   05/18/17 138/70   05/17/17 124/72     Pulse Readings from Last 3 Encounters:   05/18/17 76   05/18/17 80   05/17/17 79       Assessment:.lstv       1. Thrombocytopenia    2. Pain, dental        Plan:       Lab Results   Component Value Date    WBC 1.51 (LL) 05/15/2017    HGB 11.8 (L) 05/15/2017    HCT 34.2 (L) 05/15/2017     (H) 05/15/2017    PLT 92 (L) 05/15/2017   '    Tooth is very loose. platelts are ok. Hand written note given to patietn to take to dentist indicating is Ok to remove that tooth

## 2017-05-18 NOTE — MR AVS SNAPSHOT
Patient Information     Patient Name Sex Carlos Morales Jr. Male 1955      Visit Information        Provider Department Dept Phone Center    2017 10:30 AM [place] [place] [facility] Chemo Infusion [facility] Chemotherapy Infusion [phone] [facility]      Patient Instructions      [facility]  [address]  [address] [address]  [phone] phone     [phone] fax  Hours of Operation: Monday- Friday 8:00am- 5:00pm    Hematology / Oncology Physicians on call      Dr. Tristan Gardiner    Please call with any concerns regarding your appointment today.           Your Current Medications Are     ondansetron (ZOFRAN) 8 MG tablet    valacyclovir (VALTREX) 500 MG tablet      Facility-Administered Medications     azacitidine injection 140 mg    ondansetron tablet 8 mg      Appointments for Next Year     2017 10:30 AM INFUSION 030 MIN (30 min.) [facility] CH[facility]IR 05 [facility]    Arrive at check-in approximately 15 minutes before your scheduled appointment time. Bring all outside medical records and imaging, along with a list of your current medications and insurance card.    (off [facility]) 1st floor    2017 10:30 AM NON FASTING LAB (10 min.) [facility] LAB[facility]AT[facility], [facility]    Arrive at check-in approximately 15 minutes before your scheduled appointment time. Bring all outside medical records and imaging, along with a list of your current medications and insurance card.    2017 11:00 AM ESTABLISHED PATIENT (20 min.) [facility] - Hematology Oncology Lavell Flor MD    Arrive at check-in approximately 15 minutes before your scheduled appointment time. Bring all outside medical records and imaging, along with a list of your current medications and insurance card.    (off [facility]) 1st Floor Appointments with [facility] - 2nd Floor         Default Flowsheet Data (last 24 hours)      Amb Complex Vitals  "Rony        05/18/17 1046 05/18/17 1016             Measurements    Weight 73.1 kg (161 lb 2.5 oz) 73.1 kg (161 lb 2.5 oz)       Height 5' 8" (1.727 m) 5' 8" (1.727 m)       BSA (Calculated - sq m) 1.87 sq meters 1.87 sq meters       BMI (Calculated) 24.6 24.6       /70 127/69       Temp 97.7 °F (36.5 °C) 98.4 °F (36.9 °C)       Pulse 80 76       Resp  18       SpO2 96 %        Pain Assessment    Pain Score One One       Pain Frequency 2  Continuous       Pain Loc TEETH --   front tooth               Allergies     No Known Allergies      Medications You Received from 05/17/2017 1112 to 05/18/2017 1112        Date/Time Order Dose Route Action     05/18/2017 1111 azacitidine injection 140 mg 140 mg Subcutaneous Given     05/18/2017 1021 ondansetron tablet 8 mg 8 mg Oral Given      Current Discharge Medication List     Cannot display discharge medications since this is not an admission.      "

## 2017-05-18 NOTE — PATIENT INSTRUCTIONS
[facility]  [address]  [address] [address]  [phone] phone     [phone] fax  Hours of Operation: Monday- Friday 8:00am- 5:00pm    Hematology / Oncology Physicians on call      Dr. Tristan Gardiner    Please call with any concerns regarding your appointment today.

## 2017-05-19 ENCOUNTER — INFUSION (OUTPATIENT)
Dept: INFUSION THERAPY | Facility: HOSPITAL | Age: 62
End: 2017-05-19
Attending: INTERNAL MEDICINE
Payer: MEDICARE

## 2017-05-19 VITALS
SYSTOLIC BLOOD PRESSURE: 127 MMHG | TEMPERATURE: 98 F | HEART RATE: 78 BPM | RESPIRATION RATE: 18 BRPM | DIASTOLIC BLOOD PRESSURE: 75 MMHG | BODY MASS INDEX: 24.5 KG/M2 | HEIGHT: 68 IN

## 2017-05-19 DIAGNOSIS — C92.02 ACUTE MYELOID LEUKEMIA IN RELAPSE: ICD-10-CM

## 2017-05-19 DIAGNOSIS — C92.00 ACUTE MYELOID LEUKEMIA NOT HAVING ACHIEVED REMISSION: Primary | ICD-10-CM

## 2017-05-19 PROCEDURE — 25000003 PHARM REV CODE 250: Performed by: INTERNAL MEDICINE

## 2017-05-19 PROCEDURE — 96401 CHEMO ANTI-NEOPL SQ/IM: CPT

## 2017-05-19 PROCEDURE — 63600175 PHARM REV CODE 636 W HCPCS: Mod: JW | Performed by: INTERNAL MEDICINE

## 2017-05-19 RX ORDER — ONDANSETRON HYDROCHLORIDE 8 MG/1
8 TABLET, FILM COATED ORAL ONCE
Status: COMPLETED | OUTPATIENT
Start: 2017-05-19 | End: 2017-05-19

## 2017-05-19 RX ORDER — AZACITIDINE 100 MG/1
75 INJECTION, POWDER, LYOPHILIZED, FOR SOLUTION INTRAVENOUS; SUBCUTANEOUS
Status: COMPLETED | OUTPATIENT
Start: 2017-05-19 | End: 2017-05-19

## 2017-05-19 RX ADMIN — AZACITIDINE 140 MG: 100 INJECTION, POWDER, LYOPHILIZED, FOR SOLUTION INTRAVENOUS; SUBCUTANEOUS at 11:05

## 2017-05-19 RX ADMIN — ONDANSETRON 8 MG: 8 TABLET, FILM COATED ORAL at 10:05

## 2017-05-29 ENCOUNTER — OFFICE VISIT (OUTPATIENT)
Dept: HEMATOLOGY/ONCOLOGY | Facility: CLINIC | Age: 62
End: 2017-05-29
Payer: MEDICARE

## 2017-05-29 ENCOUNTER — TELEPHONE (OUTPATIENT)
Dept: HEMATOLOGY/ONCOLOGY | Facility: CLINIC | Age: 62
End: 2017-05-29

## 2017-05-29 ENCOUNTER — LAB VISIT (OUTPATIENT)
Dept: LAB | Facility: HOSPITAL | Age: 62
End: 2017-05-29
Attending: INTERNAL MEDICINE
Payer: MEDICARE

## 2017-05-29 VITALS
TEMPERATURE: 97 F | DIASTOLIC BLOOD PRESSURE: 66 MMHG | OXYGEN SATURATION: 98 % | BODY MASS INDEX: 24.32 KG/M2 | WEIGHT: 160.5 LBS | SYSTOLIC BLOOD PRESSURE: 110 MMHG | HEART RATE: 92 BPM | HEIGHT: 68 IN

## 2017-05-29 DIAGNOSIS — C92.00 ACUTE MYELOID LEUKEMIA NOT HAVING ACHIEVED REMISSION: Primary | ICD-10-CM

## 2017-05-29 DIAGNOSIS — C92.00 ACUTE MYELOID LEUKEMIA NOT HAVING ACHIEVED REMISSION: ICD-10-CM

## 2017-05-29 LAB
ANISOCYTOSIS BLD QL SMEAR: SLIGHT
BASOPHILS # BLD AUTO: ABNORMAL K/UL
BASOPHILS NFR BLD: 0 %
DIFFERENTIAL METHOD: ABNORMAL
EOSINOPHIL # BLD AUTO: ABNORMAL K/UL
EOSINOPHIL NFR BLD: 3 %
ERYTHROCYTE [DISTWIDTH] IN BLOOD BY AUTOMATED COUNT: 13.7 %
HCT VFR BLD AUTO: 31.7 %
HGB BLD-MCNC: 11 G/DL
LYMPHOCYTES # BLD AUTO: ABNORMAL K/UL
LYMPHOCYTES NFR BLD: 87 %
MCH RBC QN AUTO: 38.3 PG
MCHC RBC AUTO-ENTMCNC: 34.7 %
MCV RBC AUTO: 111 FL
MONOCYTES # BLD AUTO: ABNORMAL K/UL
MONOCYTES NFR BLD: 8 %
NEUTROPHILS NFR BLD: 2 %
PLATELET # BLD AUTO: 43 K/UL
PLATELET BLD QL SMEAR: ABNORMAL
PMV BLD AUTO: 9.7 FL
RBC # BLD AUTO: 2.87 M/UL
WBC # BLD AUTO: 1.33 K/UL

## 2017-05-29 PROCEDURE — 99214 OFFICE O/P EST MOD 30 MIN: CPT | Mod: S$GLB,,, | Performed by: INTERNAL MEDICINE

## 2017-05-29 PROCEDURE — 99999 PR PBB SHADOW E&M-EST. PATIENT-LVL III: CPT | Mod: PBBFAC,,, | Performed by: INTERNAL MEDICINE

## 2017-05-29 PROCEDURE — 36415 COLL VENOUS BLD VENIPUNCTURE: CPT

## 2017-05-29 PROCEDURE — 85027 COMPLETE CBC AUTOMATED: CPT

## 2017-05-29 NOTE — PROGRESS NOTES
Subjective:       Patient ID: Carlos Jordan Jr. is a 62 y.o. male.    Chief Complaint: No chief complaint on file.    HPI This is a 62 year-old gentleman who comes for follow up of his AML.  He was found to have pancytopenia during a routine cbc done at the ER because of other complains.  A bone marrow was done on 11/4/2016. 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  He said he was not interested in discussing a stem cell transplant .  He said he would prefer to be treated with a low toxicity agent like Vidaza which would allow him to be treated as an outpatient.  He came in for C6   d15  of f VIDAZA    ALLERGIES: None.     MEDICATIONS: None.     PREVIOUS SURGERIES: Amputation of the tip of the right middle finger.     SOCIAL HISTORY: , has one child. He lives in [place]. He smokes half a  pack a day and has been doing this for 35 years. Denies significant drinking.   He is retired.     FAMILY HISTORY: Brother had brain tumor. Father had diabetes. No heart   attacks.     PAST MEDICAL HISTORY:  1. AML  2. Tobacco use.    3-thrombocytopenia  Review of Systems   Constitutional: Negative.  Negative for fatigue.   Eyes: Negative.    Cardiovascular: Negative.  Negative for chest pain.   Gastrointestinal: Negative for abdominal pain and nausea.   Genitourinary: Negative.  Negative for hematuria.   Musculoskeletal: Negative.    Skin: Negative.    Neurological: Negative.    Psychiatric/Behavioral: Negative.        Objective:      Physical Exam   Constitutional: He is oriented to person, place, and time. He appears well-developed and well-nourished.   HENT:   Head: Normocephalic.   Mouth/Throat: No oropharyngeal exudate.   Eyes: Pupils are equal, round, and reactive to light.   Neck: No thyromegaly  present.   Cardiovascular: Normal rate, regular rhythm and normal heart sounds.  Exam reveals no gallop.    No murmur heard.  Pulmonary/Chest: No respiratory distress. He has no wheezes. He has no rales.   Abdominal: Soft. Bowel sounds are normal. He exhibits no distension and no mass. There is no rebound and no guarding.   Musculoskeletal: Normal range of motion. He exhibits no edema.   Lymphadenopathy:     He has no cervical adenopathy.   Neurological: He is alert and oriented to person, place, and time.   Skin: Skin is warm and dry.   Psychiatric: He has a normal mood and affect. His behavior is normal.       Wt Readings from Last 3 Encounters:   05/29/17 72.8 kg (160 lb 7.9 oz)   05/18/17 73.1 kg (161 lb 2.5 oz)   05/18/17 73.1 kg (161 lb 2.5 oz)     Temp Readings from Last 3 Encounters:   05/29/17 96.8 °F (36 °C) (Oral)   05/19/17 98.1 °F (36.7 °C)   05/18/17 98.4 °F (36.9 °C)     BP Readings from Last 3 Encounters:   05/29/17 110/66   05/19/17 127/75   05/18/17 127/69     Pulse Readings from Last 3 Encounters:   05/29/17 92   05/19/17 78   05/18/17 76       Assessment:       1. Acute myeloid leukemia not having achieved remission        Plan:       Lab Results   Component Value Date    WBC 1.33 (LL) 05/29/2017    HGB 11.0 (L) 05/29/2017    HCT 31.7 (L) 05/29/2017     (H) 05/29/2017    PLT 43 (L) 05/29/2017       He remains doing well. Counts are adequate for a day 15 of treatment  He will be [facility] bone [facility].  See me the day before with a cbc to see if he will platelets before the procedure

## 2017-05-29 NOTE — TELEPHONE ENCOUNTER
Received a call From Peyton in pathology.  Only one pathologist in on Wednesday will have to rechedule patient for the week after next.  Patient advised. OR advised  Transfusion advised.  Will contact the patient tomorrow with a new date.

## 2017-06-12 ENCOUNTER — LAB VISIT (OUTPATIENT)
Dept: LAB | Facility: HOSPITAL | Age: 62
End: 2017-06-12
Attending: INTERNAL MEDICINE
Payer: MEDICARE

## 2017-06-12 ENCOUNTER — ANESTHESIA EVENT (OUTPATIENT)
Dept: SURGERY | Facility: HOSPITAL | Age: 62
End: 2017-06-12
Payer: MEDICARE

## 2017-06-12 ENCOUNTER — OFFICE VISIT (OUTPATIENT)
Dept: HEMATOLOGY/ONCOLOGY | Facility: CLINIC | Age: 62
End: 2017-06-12
Payer: MEDICARE

## 2017-06-12 VITALS
HEIGHT: 68 IN | TEMPERATURE: 98 F | HEART RATE: 85 BPM | DIASTOLIC BLOOD PRESSURE: 62 MMHG | WEIGHT: 161.38 LBS | OXYGEN SATURATION: 98 % | RESPIRATION RATE: 18 BRPM | SYSTOLIC BLOOD PRESSURE: 122 MMHG | BODY MASS INDEX: 24.46 KG/M2

## 2017-06-12 DIAGNOSIS — C92.00 ACUTE MYELOID LEUKEMIA NOT HAVING ACHIEVED REMISSION: ICD-10-CM

## 2017-06-12 DIAGNOSIS — C92.00 ACUTE MYELOID LEUKEMIA NOT HAVING ACHIEVED REMISSION: Primary | ICD-10-CM

## 2017-06-12 LAB
BASOPHILS # BLD AUTO: ABNORMAL K/UL
BASOPHILS NFR BLD: 0 %
DIFFERENTIAL METHOD: ABNORMAL
EOSINOPHIL # BLD AUTO: ABNORMAL K/UL
EOSINOPHIL NFR BLD: 1 %
ERYTHROCYTE [DISTWIDTH] IN BLOOD BY AUTOMATED COUNT: 14.5 %
HCT VFR BLD AUTO: 29.9 %
HGB BLD-MCNC: 10.2 G/DL
LYMPHOCYTES # BLD AUTO: ABNORMAL K/UL
LYMPHOCYTES NFR BLD: 92 %
MCH RBC QN AUTO: 37.1 PG
MCHC RBC AUTO-ENTMCNC: 34.1 %
MCV RBC AUTO: 109 FL
MONOCYTES # BLD AUTO: ABNORMAL K/UL
MONOCYTES NFR BLD: 2 %
NEUTROPHILS NFR BLD: 5 %
PLATELET # BLD AUTO: 66 K/UL
PLATELET BLD QL SMEAR: ABNORMAL
PMV BLD AUTO: 9.9 FL
RBC # BLD AUTO: 2.75 M/UL
WBC # BLD AUTO: 1.83 K/UL

## 2017-06-12 PROCEDURE — 99999 PR PBB SHADOW E&M-EST. PATIENT-LVL III: CPT | Mod: PBBFAC,,, | Performed by: INTERNAL MEDICINE

## 2017-06-12 PROCEDURE — 85027 COMPLETE CBC AUTOMATED: CPT | Mod: PO

## 2017-06-12 PROCEDURE — 99214 OFFICE O/P EST MOD 30 MIN: CPT | Mod: S$GLB,,, | Performed by: INTERNAL MEDICINE

## 2017-06-12 PROCEDURE — 85007 BL SMEAR W/DIFF WBC COUNT: CPT | Mod: PO

## 2017-06-12 PROCEDURE — 36415 COLL VENOUS BLD VENIPUNCTURE: CPT | Mod: PO

## 2017-06-12 NOTE — PRE ADMISSION SCREENING
Pre op instructions reviewed with patient per phone:    To confirm, Your surgeon has instructed you:  Surgery is scheduled 06/13/17 at 1330.      Please report to [facility] 1st floor main lobby by 1200  Pre admit office to call later today only if arrival time changes.  INSTRUCTIONS IMPORTANT!!!  ¨ Do not eat, drink, or smoke after 12 midnight, may drink water and apple juice until 3h prior to surgery. OK to brush teeth, no gum, candy or mints!    ¨ Take only these medicines with a small swallow of water-morning of surgery.  N/A        ____  Do not wear makeup, including mascara.  ____  No powder, lotions or creams to surgical area.  ____  Please remove all jewelry, including piercings and leave at home.  ____  No money or valuables needed. Please leave at home.  ____  Please bring identification and insurance information to hospital.  ____  If going home the same day, arrange for a ride home. You will not be able to   drive if Anesthesia was used.  ____  Children, under 12 years old, must remain in the waiting room with an adult.  They are not allowed in patient areas.  ____  Wear loose fitting clothing. Allow for dressings, bandages.  ____  Stop Aspirin, Ibuprofen, Motrin and Aleve at least 5-7 days before surgery, unless otherwise instructed by your doctor, or the nurse.   You MAY use Tylenol/acetaminophen until day of surgery.  ____  If you take diabetic medication, do not take am of surgery unless instructed by   Doctor.  ____ Stop taking any Fish Oil supplement or any Vitamins that contain Vitamin E at least 5 days prior to surgery.          Bathing Instructions-- The night before surgery and the morning prior to coming to the hospital:   -Do not shave the surgical area.   -Shower and wash your hair and body as usual with anti-bacterial  soap and shampoo.   -Rinse your hair and body completely.   -Use one packet of hibiclens to wash the surgical site (using your hand) gently for 5  minutes.  Do not scrub you skin too hard.   -Do not use hibiclens on your head, face, or genitals.   -Do not wash with anti-bacterial soap after you use the hibiclens.   -Rinse your body thoroughly.   -Dry with clean, soft towel.  Do not use lotion, cream, deodorant, or powders on   the surgical site.    Use antibacterial soap in place of hibiclens if your surgery is on the head, face or genitals.         Surgical Site Infection    Prevention of surgical site infections:     -Keep incisions clean and dry.   -Do not soak/submerge incisions in water until completely healed.   -Do not apply lotions, powders, creams, or deodorants to site.   -Always make sure hands are cleaned with antibacterial soap/ alcohol-based   prior to touching the surgical site.  (This includes doctors, nurses, staff, and yourself.)    Signs and symptoms:   -Redness and pain around the area where you had surgery   -Drainage of cloudy fluid from your surgical wound   -Fever over 100.4  I have read or had read and explained to me, and understand the above information.

## 2017-06-12 NOTE — PROGRESS NOTES
Subjective:       Patient ID: Carlos Jordan Jr. is a 62 y.o. male.    Chief Complaint: Leukemia    HPI This is a 62 year-old gentleman who comes for follow up of his AML.  He was found to have pancytopenia during a routine cbc done at the ER because of other complains.  A bone marrow was done on 11/4/2016. 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  He said he was not interested in discussing a stem cell transplant .  He said he would prefer to be treated with a low toxicity agent like Vidaza which would allow him to be treated as an outpatient.  He came in after 6 cycles of VIDAZA     ALLERGIES: None.     MEDICATIONS: None.     PREVIOUS SURGERIES: Amputation of the tip of the right middle finger.     SOCIAL HISTORY: , has one child. He lives in [place]. He smokes half a  pack a day and has been doing this for 35 years. Denies significant drinking.   He is retired.     FAMILY HISTORY: Brother had brain tumor. Father had diabetes. No heart   attacks.     PAST MEDICAL HISTORY:  1. AML  2. Tobacco use.    3-thrombocytopenia  Review of Systems   Constitutional: Negative.  Negative for fatigue.   Eyes: Negative.    Cardiovascular: Negative.  Negative for chest pain.   Gastrointestinal: Negative for abdominal pain and nausea.   Genitourinary: Negative.  Negative for hematuria.   Musculoskeletal: Negative.    Skin: Negative.    Neurological: Negative.    Psychiatric/Behavioral: Negative.        Objective:      Physical Exam   Constitutional: He is oriented to person, place, and time. He appears well-developed and well-nourished.   HENT:   Head: Normocephalic.   Mouth/Throat: No oropharyngeal exudate.   Eyes: Pupils are equal, round, and reactive to light.   Neck: No thyromegaly present.    Cardiovascular: Normal rate, regular rhythm and normal heart sounds.  Exam reveals no gallop.    No murmur heard.  Pulmonary/Chest: No respiratory distress. He has no wheezes. He has no rales.   Abdominal: Soft. Bowel sounds are normal. He exhibits no distension and no mass. There is no rebound and no guarding.   Musculoskeletal: Normal range of motion. He exhibits no edema.   Lymphadenopathy:     He has no cervical adenopathy.   Neurological: He is alert and oriented to person, place, and time.   Skin: Skin is warm and dry.   Psychiatric: He has a normal mood and affect. His behavior is normal.       Wt Readings from Last 3 Encounters:   06/12/17 73.2 kg (161 lb 6 oz)   05/29/17 72.8 kg (160 lb 7.9 oz)   05/18/17 73.1 kg (161 lb 2.5 oz)     Temp Readings from Last 3 Encounters:   06/12/17 98 °F (36.7 °C)   05/29/17 96.8 °F (36 °C) (Oral)   05/19/17 98.1 °F (36.7 °C)     BP Readings from Last 3 Encounters:   06/12/17 122/62   05/29/17 110/66   05/19/17 127/75     Pulse Readings from Last 3 Encounters:   06/12/17 85   05/29/17 92   05/19/17 78       Assessment:       1. Acute myeloid leukemia not having achieved remission        Plan:       Lab Results   Component Value Date    WBC 1.83 (LL) 06/12/2017    HGB 10.2 (L) 06/12/2017    HCT 29.9 (L) 06/12/2017     (H) 06/12/2017    PLT 66 (L) 06/12/2017       He will have a bone marrow udnersedation tomorrow at the [facility]. No need for transfusion   of plateelt.  See me in 7 days with a cbc

## 2017-06-13 ENCOUNTER — HOSPITAL ENCOUNTER (OUTPATIENT)
Facility: HOSPITAL | Age: 62
Discharge: HOME | End: 2017-06-13
Attending: PATHOLOGY | Admitting: INTERNAL MEDICINE
Payer: MEDICARE

## 2017-06-13 ENCOUNTER — ANESTHESIA (OUTPATIENT)
Dept: SURGERY | Facility: HOSPITAL | Age: 62
End: 2017-06-13
Payer: MEDICARE

## 2017-06-13 ENCOUNTER — SURGERY (OUTPATIENT)
Age: 62
End: 2017-06-13

## 2017-06-13 VITALS
DIASTOLIC BLOOD PRESSURE: 67 MMHG | OXYGEN SATURATION: 97 % | BODY MASS INDEX: 24.25 KG/M2 | TEMPERATURE: 98 F | WEIGHT: 160 LBS | SYSTOLIC BLOOD PRESSURE: 117 MMHG | RESPIRATION RATE: 18 BRPM | HEART RATE: 76 BPM | HEIGHT: 68 IN

## 2017-06-13 PROCEDURE — 37000008 HC ANESTHESIA 1ST 15 MINUTES: Performed by: PATHOLOGY

## 2017-06-13 PROCEDURE — 38221 DX BONE MARROW BIOPSIES: CPT | Performed by: PATHOLOGY

## 2017-06-13 PROCEDURE — 88342 IMHCHEM/IMCYTCHM 1ST ANTB: CPT | Mod: 26,59,, | Performed by: PATHOLOGY

## 2017-06-13 PROCEDURE — 88313 SPECIAL STAINS GROUP 2: CPT

## 2017-06-13 PROCEDURE — 88311 DECALCIFY TISSUE: CPT | Mod: 26,,, | Performed by: PATHOLOGY

## 2017-06-13 PROCEDURE — 88313 SPECIAL STAINS GROUP 2: CPT | Mod: 26,,, | Performed by: PATHOLOGY

## 2017-06-13 PROCEDURE — 88237 TISSUE CULTURE BONE MARROW: CPT

## 2017-06-13 PROCEDURE — 25000003 PHARM REV CODE 250: Performed by: ANESTHESIOLOGY

## 2017-06-13 PROCEDURE — 88184 FLOWCYTOMETRY/ TC 1 MARKER: CPT | Performed by: PATHOLOGY

## 2017-06-13 PROCEDURE — 25000003 PHARM REV CODE 250: Performed by: NURSE ANESTHETIST, CERTIFIED REGISTERED

## 2017-06-13 PROCEDURE — 88291 CYTO/MOLECULAR REPORT: CPT

## 2017-06-13 PROCEDURE — 88305 TISSUE EXAM BY PATHOLOGIST: CPT | Mod: 26,,, | Performed by: PATHOLOGY

## 2017-06-13 PROCEDURE — 88305 TISSUE EXAM BY PATHOLOGIST: CPT | Performed by: PATHOLOGY

## 2017-06-13 PROCEDURE — 88185 FLOWCYTOMETRY/TC ADD-ON: CPT | Mod: 59 | Performed by: PATHOLOGY

## 2017-06-13 PROCEDURE — 88189 FLOWCYTOMETRY/READ 16 & >: CPT | Mod: ,,, | Performed by: PATHOLOGY

## 2017-06-13 PROCEDURE — 38221 DX BONE MARROW BIOPSIES: CPT | Mod: LT,,, | Performed by: PATHOLOGY

## 2017-06-13 PROCEDURE — 88264 CHROMOSOME ANALYSIS 20-25: CPT

## 2017-06-13 PROCEDURE — 37000009 HC ANESTHESIA EA ADD 15 MINS: Performed by: PATHOLOGY

## 2017-06-13 PROCEDURE — 63600175 PHARM REV CODE 636 W HCPCS: Performed by: NURSE ANESTHETIST, CERTIFIED REGISTERED

## 2017-06-13 PROCEDURE — 85097 BONE MARROW INTERPRETATION: CPT | Mod: ,,, | Performed by: PATHOLOGY

## 2017-06-13 RX ORDER — SODIUM CHLORIDE 9 MG/ML
3 INJECTION, SOLUTION INTRAMUSCULAR; INTRAVENOUS; SUBCUTANEOUS EVERY 8 HOURS
Status: DISCONTINUED | OUTPATIENT
Start: 2017-06-13 | End: 2017-06-15 | Stop reason: HOSPADM

## 2017-06-13 RX ORDER — PROPOFOL 10 MG/ML
VIAL (ML) INTRAVENOUS
Status: DISCONTINUED | OUTPATIENT
Start: 2017-06-13 | End: 2017-06-13

## 2017-06-13 RX ORDER — SODIUM CHLORIDE, SODIUM LACTATE, POTASSIUM CHLORIDE, CALCIUM CHLORIDE 600; 310; 30; 20 MG/100ML; MG/100ML; MG/100ML; MG/100ML
INJECTION, SOLUTION INTRAVENOUS CONTINUOUS
Status: DISCONTINUED | OUTPATIENT
Start: 2017-06-13 | End: 2017-06-15 | Stop reason: HOSPADM

## 2017-06-13 RX ORDER — OXYCODONE HYDROCHLORIDE 5 MG/1
5 TABLET ORAL
Status: DISCONTINUED | OUTPATIENT
Start: 2017-06-13 | End: 2017-06-15 | Stop reason: HOSPADM

## 2017-06-13 RX ORDER — LIDOCAINE HYDROCHLORIDE 20 MG/ML
INJECTION, SOLUTION EPIDURAL; INFILTRATION; INTRACAUDAL; PERINEURAL
Status: DISCONTINUED | OUTPATIENT
Start: 2017-06-13 | End: 2017-06-13

## 2017-06-13 RX ORDER — LIDOCAINE HYDROCHLORIDE 10 MG/ML
1 INJECTION, SOLUTION EPIDURAL; INFILTRATION; INTRACAUDAL; PERINEURAL ONCE
Status: DISCONTINUED | OUTPATIENT
Start: 2017-06-13 | End: 2017-06-15 | Stop reason: HOSPADM

## 2017-06-13 RX ORDER — MORPHINE SULFATE 10 MG/ML
2 INJECTION INTRAMUSCULAR; INTRAVENOUS; SUBCUTANEOUS EVERY 5 MIN PRN
Status: DISCONTINUED | OUTPATIENT
Start: 2017-06-13 | End: 2017-06-15 | Stop reason: HOSPADM

## 2017-06-13 RX ORDER — SODIUM CHLORIDE 9 MG/ML
3 INJECTION, SOLUTION INTRAMUSCULAR; INTRAVENOUS; SUBCUTANEOUS
Status: DISCONTINUED | OUTPATIENT
Start: 2017-06-13 | End: 2017-06-15 | Stop reason: HOSPADM

## 2017-06-13 RX ORDER — MEPERIDINE HYDROCHLORIDE 50 MG/ML
12.5 INJECTION INTRAMUSCULAR; INTRAVENOUS; SUBCUTANEOUS ONCE AS NEEDED
Status: ACTIVE | OUTPATIENT
Start: 2017-06-13 | End: 2017-06-13

## 2017-06-13 RX ORDER — FENTANYL CITRATE 50 UG/ML
INJECTION, SOLUTION INTRAMUSCULAR; INTRAVENOUS
Status: DISCONTINUED | OUTPATIENT
Start: 2017-06-13 | End: 2017-06-13

## 2017-06-13 RX ADMIN — PROPOFOL 20 MG: 10 INJECTION, EMULSION INTRAVENOUS at 01:06

## 2017-06-13 RX ADMIN — FENTANYL CITRATE 25 MCG: 50 INJECTION, SOLUTION INTRAMUSCULAR; INTRAVENOUS at 01:06

## 2017-06-13 RX ADMIN — SODIUM CHLORIDE, SODIUM LACTATE, POTASSIUM CHLORIDE, AND CALCIUM CHLORIDE: 600; 310; 30; 20 INJECTION, SOLUTION INTRAVENOUS at 01:06

## 2017-06-13 RX ADMIN — LIDOCAINE HYDROCHLORIDE 20 MG: 20 INJECTION, SOLUTION EPIDURAL; INFILTRATION; INTRACAUDAL; PERINEURAL at 01:06

## 2017-06-13 NOTE — ANESTHESIA PREPROCEDURE EVALUATION
06/13/2017  Carlos Jordan Jr. is a 62 y.o., male.    Anesthesia Evaluation    I have reviewed the Patient Summary Reports.    I have reviewed the Nursing Notes.   I have reviewed the Medications.     Review of Systems  Anesthesia Hx:  No problems with previous Anesthesia  Denies Family Hx of Anesthesia complications.   Denies Personal Hx of Anesthesia complications.   Social:  Smoker    Hematology/Oncology:         Oncology: leukemia.   Cardiovascular:   Hypertension Dysrhythmias atrial fibrillation    Pulmonary:  Pulmonary Normal    Neurological:   CVA        Physical Exam  General:  Well nourished    Airway/Jaw/Neck:  Airway Findings: Mallampati: II     Dental:  DENTAL FINDINGS: Normal   Chest/Lungs:  Chest/Lungs Findings: Normal Respiratory Rate     Heart/Vascular:  Heart Findings: Normal            Anesthesia Plan  Type of Anesthesia, risks & benefits discussed:  Anesthesia Type:  MAC  Patient's Preference:   Intra-op Monitoring Plan:   Intra-op Monitoring Plan Comments:   Post Op Pain Control Plan:   Post Op Pain Control Plan Comments:   Induction:    Beta Blocker:  Patient is not currently on a Beta-Blocker (No further documentation required).       Informed Consent: Patient understands risks and agrees with Anesthesia plan.  Questions answered. Anesthesia consent signed with patient.  ASA Score: 3     Day of Surgery Review of History & Physical: I have interviewed and examined the patient. I have reviewed the patient's H&P dated:  There are no significant changes.          Ready For Surgery From Anesthesia Perspective.

## 2017-06-13 NOTE — DISCHARGE INSTRUCTIONS
Bone Marrow Aspiration and Biopsy    Bone marrow is the soft, spongy part inside bones. It makes most of the bodys blood cells. Aspiration and biopsy are procedures done to take a sample of bone marrow out of the body for examination. To perform either procedure, a needle is inserted into one of your bones, usually the back of the hip bone. Then a sample of bone marrow is removed.   If a sample of fluid and cells is taken, it is called bone marrow aspiration. If a solid sample of bone marrow tissue is removed, it is called bone marrow biopsy. In either case, the samples are sent to a lab and studied. The procedures can be done alone, but are most often done together. This sheet tells you more about what to expect.  Why the procedures are done  The procedures may be done for a number of reasons. They can help diagnose certain blood or bone marrow disorders or infections. They may help find certain cancers, such as leukemia. They can show if cancer in other areas of the body has spread to the bone marrow. They can be used during cancer treatment, such as chemotherapy, to monitor treatment progress. And they may be done before certain treatments, such as a stem cell transplant, which require a bone marrow sample. Your healthcare provider will explain why you need the procedure and answer any questions you have.  Preparing for the procedure  Prepare for the procedure as told. In addition:  · Tell your healthcare provider:  ¨ What medicines you take. This includes blood thinners, such as aspirin. This also includes over-the-counter medicines, herbs, and other supplements. You may need to stop taking some or all of them before the procedure.  ¨ If you are allergic to any medicines. Also mention if you have ever had a reaction to medicines used during other tests or procedures in the past.  ¨ If you have a history of bleeding problems.  ¨ If you are pregnant or may be pregnant.  · Follow any directions youre given  for not eating or drinking before the procedure.  The day of the procedure  The procedures can be done at a hospital, clinic, or healthcare providers office. They are performed by a healthcare provider or trained healthcare provider. Whether youre having one or both procedures, plan to be at the facility for 1 to 2 hours. Youll likely go home the same day.  Before the procedure begins  What to expect before the procedure:  · Youll change into a patient gown.  · An IV line may be put into a vein in your arm or hand. This line supplies fluids and medicines.  · Youll be given a sedative to help you relax, if needed. This medicine is given by pill, injection, or through the IV line.  During the procedure  What to expect during the procedure:  · Youll lie on your side or your stomach.  · The site to be used for the bone marrow samples is marked and cleaned. The most common site is the back of the hip bone. Less common sites include the front of the hip bone or breastbone.  · Numbing medicine (local anesthesia) is injected at the site.  · A small cut is made through the numbed skin.  · One or both procedures are then done. Be sure to lie still for each procedure. It is normal to feel some pressure or pain during each procedure.  ¨ For the bone marrow aspiration, a thin needle is put through the cut and into the bone. A syringe is then attached to the needle and used to remove a sample of bone marrow fluid and cells.  ¨ For the bone marrow biopsy, a different needle is put through the same cut and into the bone. A small amount of bone marrow tissue is then removed.  · The samples are sent to a lab to be evaluated.  · When the procedure is complete, pressure is applied to the site for 10 to 15 minutes to help stop bleeding. The site is then bandaged.  After the procedure  Most people can go home after a short period of observation. If you need it, youll be given medicine to manage pain. If you were given a sedative,  you may be taken to a recovery room to rest until the medicine wears off. An adult family member or friend must drive you home afterward.  Recovering at home  Once at home, follow any instructions youre given. Be sure to:  · Take all medicines as directed.  · Care for the procedure site as instructed.  · Check for signs of infection at the procedure site (see below).  · Avoid getting the procedure site wet. Do not bathe or shower until your healthcare providers say it is OK to do so. If you wish, you may wash with a sponge or washcloth.  · Avoid heavy lifting and other strenuous activities as directed.     Call the healthcare provider  Call your healthcare provider if you have any of the following:  · Fever of 100.4 ºF (38 ºC) or higher, or as directed by your healthcare provider  · Signs of infection at the procedure site, such as increased redness or swelling, warmth, worsening pain, bleeding, or foul-smelling drainage   Follow-up  Your healthcare provider will discuss the results with you when they are ready. This is usually within a week after the procedure.     Risks and possible complications of these procedures  These include:  · Severe bleeding or bruising at the procedure site  · Infection at the procedure site  · Bone fracture  · Bone infection   Date Last Reviewed: 10/8/2015  © 1764-8851 [employer]. [address] [place] [address]. All rights reserved. This information is not intended as a substitute for professional medical care. Always follow your healthcare professional's instructions.            Types of Anesthesia  Your anesthesiologist is a key member of your surgical team. He or she gives you anesthetics (medications to keep you comfortable and decrease your awareness of surgery) and monitors your condition to keep you safe during surgery. You will have 1 of 3 kinds of anesthesia during your surgery.    Monitored anesthesia care (MAC)  · Often used for surgery that is  short or not too invasive.  · Sedatives (medications to relax you) are given through an IV (intravenous) line.  · The area around the surgical site is usually numbed with a local anesthetic.  · You may choose to remain awake or sleep lightly.    Regional anesthesia (sometimes called spinal epidural or darius block)  · Often used for surgery on the arms, legs, and abdomen. It is also used during childbirth.  · A specific region of your body is numbed by injecting anesthetic near nerves, near your spine, or near the operative site.  · You may also be given sedatives through an IV line to relax you.  · With regional anesthesia, you may choose to remain awake or sleep lightly.    General anesthesia  · Often used for extensive surgery, such as on the heart, brain, or abdominal operation.  · Also used when the patient wants to be totally asleep.  · May be given as a gas that you breathe and as medications that are injected through an IV line.  · Because you are asleep, you feel no pain and remember nothing of the surgery.  The risks and complications of anesthesia depend on your overall health. If you are healthy, the risks are low. The risks are higher for patients with heart or lung problems. Your anesthesiologist or nurse anesthetist will discuss the risks with you.   Date Last Reviewed: 10/17/2014  © 8163-9914 [employer]. [address] [place] [address], [place] [address]. All rights reserved. This information is not intended as a substitute for professional medical care. Always follow your healthcare professional's instructions.

## 2017-06-13 NOTE — ANESTHESIA POSTPROCEDURE EVALUATION
"Anesthesia Post Evaluation    Patient: Carlos Jordan Jr.    Procedure(s) Performed: Procedure(s) (LRB):  BIOPSY-BONE MARROW (Left)    Final Anesthesia Type: MAC  Patient location during evaluation: PACU  Patient participation: Yes- Able to Participate  Level of consciousness: awake and alert  Post-procedure vital signs: reviewed and stable  Pain management: adequate  Airway patency: patent  PONV status at discharge: No PONV  Anesthetic complications: no      Cardiovascular status: blood pressure returned to baseline  Respiratory status: unassisted  Hydration status: euvolemic  Follow-up not needed.        Visit Vitals  /67   Pulse 76   Temp 36.8 °C (98.2 °F) (Temporal)   Resp 18   Ht 5' 8" (1.727 m)   Wt 72.6 kg (160 lb)   SpO2 97%   BMI 24.33 kg/m²       Pain/Luis Score: Pain Assessment Performed: Yes (6/13/2017  2:10 PM)  Presence of Pain: denies (6/13/2017  2:45 PM)  Luis Score: 10 (6/13/2017  3:02 PM)      "

## 2017-06-13 NOTE — PLAN OF CARE
Pt and pt friend given discharge instructions. Both stated understanding. Pt brought to main entrance via wheelchair with RN.

## 2017-06-13 NOTE — H&P (VIEW-ONLY)
Subjective:       Patient ID: Carlos Jordan Jr. is a 62 y.o. male.    Chief Complaint: Leukemia    HPI This is a 62 year-old gentleman who comes for follow up of his AML.  He was found to have pancytopenia during a routine cbc done at the ER because of other complains.  A bone marrow was done on 11/4/2016. 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  He said he was not interested in discussing a stem cell transplant .  He said he would prefer to be treated with a low toxicity agent like Vidaza which would allow him to be treated as an outpatient.  He came in after 6 cycles of VIDAZA     ALLERGIES: None.     MEDICATIONS: None.     PREVIOUS SURGERIES: Amputation of the tip of the right middle finger.     SOCIAL HISTORY: , has one child. He lives in [place]. He smokes half a  pack a day and has been doing this for 35 years. Denies significant drinking.   He is retired.     FAMILY HISTORY: Brother had brain tumor. Father had diabetes. No heart   attacks.     PAST MEDICAL HISTORY:  1. AML  2. Tobacco use.    3-thrombocytopenia  Review of Systems   Constitutional: Negative.  Negative for fatigue.   Eyes: Negative.    Cardiovascular: Negative.  Negative for chest pain.   Gastrointestinal: Negative for abdominal pain and nausea.   Genitourinary: Negative.  Negative for hematuria.   Musculoskeletal: Negative.    Skin: Negative.    Neurological: Negative.    Psychiatric/Behavioral: Negative.        Objective:      Physical Exam   Constitutional: He is oriented to person, place, and time. He appears well-developed and well-nourished.   HENT:   Head: Normocephalic.   Mouth/Throat: No oropharyngeal exudate.   Eyes: Pupils are equal, round, and reactive to light.   Neck: No thyromegaly present.    Cardiovascular: Normal rate, regular rhythm and normal heart sounds.  Exam reveals no gallop.    No murmur heard.  Pulmonary/Chest: No respiratory distress. He has no wheezes. He has no rales.   Abdominal: Soft. Bowel sounds are normal. He exhibits no distension and no mass. There is no rebound and no guarding.   Musculoskeletal: Normal range of motion. He exhibits no edema.   Lymphadenopathy:     He has no cervical adenopathy.   Neurological: He is alert and oriented to person, place, and time.   Skin: Skin is warm and dry.   Psychiatric: He has a normal mood and affect. His behavior is normal.       Wt Readings from Last 3 Encounters:   06/12/17 73.2 kg (161 lb 6 oz)   05/29/17 72.8 kg (160 lb 7.9 oz)   05/18/17 73.1 kg (161 lb 2.5 oz)     Temp Readings from Last 3 Encounters:   06/12/17 98 °F (36.7 °C)   05/29/17 96.8 °F (36 °C) (Oral)   05/19/17 98.1 °F (36.7 °C)     BP Readings from Last 3 Encounters:   06/12/17 122/62   05/29/17 110/66   05/19/17 127/75     Pulse Readings from Last 3 Encounters:   06/12/17 85   05/29/17 92   05/19/17 78       Assessment:       1. Acute myeloid leukemia not having achieved remission        Plan:       Lab Results   Component Value Date    WBC 1.83 (LL) 06/12/2017    HGB 10.2 (L) 06/12/2017    HCT 29.9 (L) 06/12/2017     (H) 06/12/2017    PLT 66 (L) 06/12/2017       He will have a bone marrow udnersedation tomorrow at the [facility]. No need for transfusion   of plateelt.  See me in 7 days with a cbc

## 2017-06-13 NOTE — BRIEF OP NOTE
Bone Marrow Biopsy  Procedure Note      Date of Service: 6/13/2017      Indication/Diagnosis: leukemia    Consent Source: self    Consent Type: elective procedure    Time Out Completed: yes    Aseptic Technique: Betadine    Anesthesia: systemic    Anesthetic Drugs Used: 1% lidocaine    Instrumentation: bone marrow kit    Procedure Site: left posterior iliac crest    Patient Position: prone    Volume Removed (in cc): 8-10    Aspirate Obtained: yes: EDTA - sent to [facility] for analysis    Fluid Characteristics: not examined    Sterile Dressing: yes    Complications: none    Narrative:  Bone marrow aspiration/biopsy performed left posterior iliac crest without complications.  Patient to lie supine x 1 hr.  Follow up with Dr. Flor.  May resume normal diet/activity.

## 2017-06-13 NOTE — TRANSFER OF CARE
"Anesthesia Transfer of Care Note    Patient: Carlos Jordan Jr.    Procedure(s) Performed: Procedure(s) (LRB):  BIOPSY-BONE MARROW (Left)    Patient location: PACU    Anesthesia Type: MAC    Transport from OR: Transported from OR on room air with adequate spontaneous ventilation    Post pain: adequate analgesia    Post assessment: no apparent anesthetic complications    Post vital signs: stable    Level of consciousness: awake    Nausea/Vomiting: no nausea/vomiting    Complications: none    Transfer of care protocol was followed      Last vitals:   Visit Vitals  BP (!) 141/78 (BP Location: Right arm, Patient Position: Lying, BP Method: Automatic)   Pulse 96   Temp 36.7 °C (98.1 °F) (Oral)   Resp 18   Ht 5' 8" (1.727 m)   Wt 72.6 kg (160 lb)   SpO2 98%   BMI 24.33 kg/m²     "

## 2017-06-14 LAB
BODY SITE - BONE MARROW: NORMAL
BONE MARROW IRON STAIN COMMENT: NORMAL
BONE MARROW WRIGHT STAIN COMMENT: NORMAL
CLINICAL DIAGNOSIS - BONE MARROW: NORMAL
FLOW CYTOMETRY ANTIBODIES ANALYZED - BONE MARROW: NORMAL
FLOW CYTOMETRY COMMENT - BONE MARROW: NORMAL
FLOW CYTOMETRY INTERPRETATION - BONE MARROW: NORMAL

## 2017-06-19 ENCOUNTER — OFFICE VISIT (OUTPATIENT)
Dept: HEMATOLOGY/ONCOLOGY | Facility: CLINIC | Age: 62
End: 2017-06-19
Payer: MEDICARE

## 2017-06-19 VITALS
SYSTOLIC BLOOD PRESSURE: 114 MMHG | TEMPERATURE: 98 F | HEART RATE: 84 BPM | WEIGHT: 161.38 LBS | DIASTOLIC BLOOD PRESSURE: 69 MMHG | BODY MASS INDEX: 24.46 KG/M2 | OXYGEN SATURATION: 98 % | HEIGHT: 68 IN

## 2017-06-19 DIAGNOSIS — C92.00 ACUTE MYELOID LEUKEMIA NOT HAVING ACHIEVED REMISSION: Primary | ICD-10-CM

## 2017-06-19 PROCEDURE — 99215 OFFICE O/P EST HI 40 MIN: CPT | Mod: S$GLB,,, | Performed by: INTERNAL MEDICINE

## 2017-06-19 PROCEDURE — 99999 PR PBB SHADOW E&M-EST. PATIENT-LVL III: CPT | Mod: PBBFAC,,, | Performed by: INTERNAL MEDICINE

## 2017-06-19 RX ORDER — HEPARIN 100 UNIT/ML
500 SYRINGE INTRAVENOUS
Status: CANCELLED | OUTPATIENT
Start: 2017-07-12

## 2017-06-19 RX ORDER — SODIUM CHLORIDE 0.9 % (FLUSH) 0.9 %
10 SYRINGE (ML) INJECTION
Status: CANCELLED | OUTPATIENT
Start: 2017-07-14

## 2017-06-19 RX ORDER — HEPARIN 100 UNIT/ML
500 SYRINGE INTRAVENOUS
Status: CANCELLED | OUTPATIENT
Start: 2017-07-11

## 2017-06-19 RX ORDER — SODIUM CHLORIDE 0.9 % (FLUSH) 0.9 %
10 SYRINGE (ML) INJECTION
Status: CANCELLED | OUTPATIENT
Start: 2017-07-12

## 2017-06-19 RX ORDER — SODIUM CHLORIDE 0.9 % (FLUSH) 0.9 %
10 SYRINGE (ML) INJECTION
Status: CANCELLED | OUTPATIENT
Start: 2017-07-13

## 2017-06-19 RX ORDER — SODIUM CHLORIDE 0.9 % (FLUSH) 0.9 %
10 SYRINGE (ML) INJECTION
Status: CANCELLED | OUTPATIENT
Start: 2017-07-11

## 2017-06-19 RX ORDER — HEPARIN 100 UNIT/ML
500 SYRINGE INTRAVENOUS
Status: CANCELLED | OUTPATIENT
Start: 2017-07-14

## 2017-06-19 RX ORDER — HEPARIN 100 UNIT/ML
500 SYRINGE INTRAVENOUS
Status: CANCELLED | OUTPATIENT
Start: 2017-07-13

## 2017-06-19 RX ORDER — HEPARIN 100 UNIT/ML
500 SYRINGE INTRAVENOUS
Status: CANCELLED | OUTPATIENT
Start: 2017-07-10

## 2017-06-19 RX ORDER — SODIUM CHLORIDE 0.9 % (FLUSH) 0.9 %
10 SYRINGE (ML) INJECTION
Status: CANCELLED | OUTPATIENT
Start: 2017-07-10

## 2017-06-19 NOTE — PROGRESS NOTES
Subjective:       Patient ID: Carlos Jordan Jr. is a 62 y.o. male.    Chief Complaint: No chief complaint on file.    HPI This is a 62 year-old gentleman who comes for follow up of his AML.  He was found to have pancytopenia during a routine cbc done at the ER because of other complains.  A bone marrow was done on 11/4/2016. 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.  He said he was not interested in discussing a stem cell transplant .  He said he would prefer to be treated with a low toxicity agent like Vidaza which would allow him to be treated as an outpatient.  He had a bone marrow after  6 cycles of VIDAZA  The bone marrow shows persistent AML with 90% celullarity and 41% blasts     ALLERGIES: None.     MEDICATIONS: None.     PREVIOUS SURGERIES: Amputation of the tip of the right middle finger.     SOCIAL HISTORY: , has one child. He lives in [place]. He smokes half a  pack a day and has been doing this for 35 years. Denies significant drinking.   He is retired.     FAMILY HISTORY: Brother had brain tumor. Father had diabetes. No heart   attacks.     PAST MEDICAL HISTORY:  1. AML  2. Tobacco use.    3-thrombocytopenia  Review of Systems   Constitutional: Negative.  Negative for fatigue.   Eyes: Negative.    Cardiovascular: Negative.  Negative for chest pain.   Gastrointestinal: Negative for abdominal pain and nausea.   Genitourinary: Negative.  Negative for hematuria.   Musculoskeletal: Negative.    Skin: Negative.    Neurological: Negative.    Psychiatric/Behavioral: Negative.        Objective:      Physical Exam   Constitutional: He is oriented to person, place, and time. He appears well-developed and well-nourished.   HENT:   Head: Normocephalic.   Mouth/Throat: No oropharyngeal  exudate.   Eyes: Pupils are equal, round, and reactive to light.   Neck: No thyromegaly present.   Cardiovascular: Normal rate, regular rhythm and normal heart sounds.  Exam reveals no gallop.    No murmur heard.  Pulmonary/Chest: No respiratory distress. He has no wheezes. He has no rales.   Abdominal: Soft. Bowel sounds are normal. He exhibits no distension and no mass. There is no rebound and no guarding.   Musculoskeletal: Normal range of motion. He exhibits no edema.   Lymphadenopathy:     He has no cervical adenopathy.   Neurological: He is alert and oriented to person, place, and time.   Skin: Skin is warm and dry.   Psychiatric: He has a normal mood and affect. His behavior is normal.       Wt Readings from Last 3 Encounters:   06/19/17 73.2 kg (161 lb 6 oz)   06/13/17 72.6 kg (160 lb)   06/12/17 73.2 kg (161 lb 6 oz)     Temp Readings from Last 3 Encounters:   06/19/17 97.5 °F (36.4 °C) (Oral)   06/13/17 98.2 °F (36.8 °C) (Temporal)   06/12/17 98 °F (36.7 °C)     BP Readings from Last 3 Encounters:   06/19/17 114/69   06/13/17 117/67   06/12/17 122/62     Pulse Readings from Last 3 Encounters:   06/19/17 84   06/13/17 76   06/12/17 85       Assessment:       1. Acute myeloid leukemia not having achieved remission        Plan:       Lab Results   Component Value Date    WBC 1.78 (LL) 06/19/2017    HGB 10.0 (L) 06/19/2017    HCT 30.1 (L) 06/19/2017     (H) 06/19/2017    PLT 58 (L) 06/19/2017     Bone marrow report discussed with fabián. He was given a copy of the report. We discussed his options which include, symptomatic treatment only, or go to another drug.  He wants to pursue treatment.   We discusesd use of DACOGEn. We discussed potential side effects associated with it, which are basically the same as the ones discussed with VIDAZA.  He will be asked to meet with Luly Danielle NP for in depth education on the drug and will see general surgery for a mediport.  See me in    10 days with a  cbc  Complex visit requiring addressing multitple medical issues

## 2017-06-21 LAB
CHROM BANDING METHOD: NORMAL
CHROMOSOME ANALYSIS BM ADDITIONAL INFORMATION: NORMAL
CHROMOSOME ANALYSIS BM RELEASED BY: NORMAL
CHROMOSOME ANALYSIS BM RESULT SUMMARY: NORMAL
CLINICAL CYTOGENETICIST REVIEW: NORMAL
KARYOTYP MAR: NORMAL
REASON FOR REFERRAL (NARRATIVE): NORMAL
REF LAB TEST METHOD: NORMAL
SPECIMEN SOURCE: NORMAL
SPECIMEN: NORMAL

## 2017-06-22 ENCOUNTER — OFFICE VISIT (OUTPATIENT)
Dept: HEMATOLOGY/ONCOLOGY | Facility: CLINIC | Age: 62
End: 2017-06-22
Payer: MEDICARE

## 2017-06-22 DIAGNOSIS — Z55.9 EDUCATIONAL CIRCUMSTANCE: ICD-10-CM

## 2017-06-22 DIAGNOSIS — Z71.89 ENCOUNTER FOR MEDICATION COUNSELING: ICD-10-CM

## 2017-06-22 DIAGNOSIS — Z79.899 HIGH RISK MEDICATION USE: ICD-10-CM

## 2017-06-22 DIAGNOSIS — C92.00 ACUTE MYELOID LEUKEMIA NOT HAVING ACHIEVED REMISSION: Primary | ICD-10-CM

## 2017-06-22 PROCEDURE — 99215 OFFICE O/P EST HI 40 MIN: CPT | Mod: S$GLB,,, | Performed by: NURSE PRACTITIONER

## 2017-06-22 PROCEDURE — 99999 PR PBB SHADOW E&M-EST. PATIENT-LVL II: CPT | Mod: PBBFAC,,, | Performed by: NURSE PRACTITIONER

## 2017-06-22 SDOH — SOCIAL DETERMINANTS OF HEALTH (SDOH): PROBLEMS RELATED TO EDUCATION AND LITERACY, UNSPECIFIED: Z55.9

## 2017-06-22 NOTE — LETTER
June 22, 2017      Lavell Flor MD  [address]  [facility] [address]           [facility]ematology Oncology  [address] [place] [address]  Phone: [phone]  Fax: [phone]          Patient: Carlos Jordan Jr.   MR Number: [id number]   YOB: 1955   Date of Visit: 6/22/2017       Dear Dr. Lavell Flor:    Thank you for referring Carlos Jordan to me for evaluation. Attached you will find relevant portions of my assessment and plan of care.    If you have questions, please do not hesitate to call me. I look forward to following Carlos Jordan along with you.    Sincerely,    SHIRA Brandosure  CC:  No Recipients    If you would like to receive this communication electronically, please contact [email or web address] or [phone] to request more information on [employer] Link access.    For providers and/or their staff who would like to refer a patient to [facility], please contact us through our one-stop-shop provider referral line, [facility] [place], at [phone].    If you feel you have received this communication in error or would no longer like to receive these types of communications, please e-mail [email or web address]

## 2017-06-22 NOTE — PROGRESS NOTES
63 y/o male presents for chemotherapy teaching. Pt given the Navigation Notebook. Explained how to use notebook. Discussed with pt rationale for chemotherapy, how works, the process of treatment, potential side effects and symptoms to report.     Reviewed the specific medications and gave him a handout describing the side effects of each medication.     Discussed potential side effects such as:  Nausea and vomiting  Myelosuppression -   Fatigue  Anorexia  Alopecia  Stomatitis  Diarrhea - how to manage with immodium  Cystitis  Gastritis  Fever > 100.0  Antiemetics instructions  Skin care  Constipation  Rash  Hyperpigmentation  Rash  Photosensitivity- pt likes to cut grass on his riding - stressed protection from the sun with clothing vs sunscreen  Sunscreen  Small freq meals  Increased protein  Increased calories  Vitamin support   Taste alterations  Neuropathys  Hydration  Renal toxicity  Community resources  Thrombocytopenia precautions  Hand and foot syndrome- symptoms and self care tips  Increased blood sugar symptoms to report discussed  Pt verbalized understanding of the information given.    Verbalizes understanding of contact information during and after clinic hours.     Pt listened and took some notes. Allowed to ask questions.     Community and social resources brought to his attention.      Time spent with pt and wife was 60 minutes. Please see further documentation in the pt education flow sheet.

## 2017-06-26 ENCOUNTER — OFFICE VISIT (OUTPATIENT)
Dept: SURGERY | Facility: CLINIC | Age: 62
End: 2017-06-26
Payer: MEDICARE

## 2017-06-26 ENCOUNTER — LAB VISIT (OUTPATIENT)
Dept: LAB | Facility: HOSPITAL | Age: 62
End: 2017-06-26
Attending: INTERNAL MEDICINE
Payer: MEDICARE

## 2017-06-26 ENCOUNTER — CLINICAL SUPPORT (OUTPATIENT)
Dept: CARDIOLOGY | Facility: CLINIC | Age: 62
End: 2017-06-26
Payer: MEDICARE

## 2017-06-26 VITALS
DIASTOLIC BLOOD PRESSURE: 79 MMHG | BODY MASS INDEX: 24.34 KG/M2 | HEART RATE: 84 BPM | SYSTOLIC BLOOD PRESSURE: 121 MMHG | TEMPERATURE: 98 F | WEIGHT: 160.06 LBS

## 2017-06-26 DIAGNOSIS — I48.91 ATRIAL FIBRILLATION, CONTROLLED: ICD-10-CM

## 2017-06-26 DIAGNOSIS — I48.91 ATRIAL FIBRILLATION, CONTROLLED: Primary | ICD-10-CM

## 2017-06-26 DIAGNOSIS — C92.00 ACUTE MYELOID LEUKEMIA NOT HAVING ACHIEVED REMISSION: ICD-10-CM

## 2017-06-26 LAB
ANISOCYTOSIS BLD QL SMEAR: SLIGHT
ANISOCYTOSIS BLD QL SMEAR: SLIGHT
BASOPHILS # BLD AUTO: 0.02 K/UL
BASOPHILS # BLD AUTO: 0.02 K/UL
BASOPHILS NFR BLD: 0.8 %
BASOPHILS NFR BLD: 0.8 %
DACRYOCYTES BLD QL SMEAR: ABNORMAL
DACRYOCYTES BLD QL SMEAR: ABNORMAL
DIFFERENTIAL METHOD: ABNORMAL
DIFFERENTIAL METHOD: ABNORMAL
EOSINOPHIL # BLD AUTO: 0 K/UL
EOSINOPHIL # BLD AUTO: 0 K/UL
EOSINOPHIL NFR BLD: 0.8 %
EOSINOPHIL NFR BLD: 0.8 %
ERYTHROCYTE [DISTWIDTH] IN BLOOD BY AUTOMATED COUNT: 15.3 %
ERYTHROCYTE [DISTWIDTH] IN BLOOD BY AUTOMATED COUNT: 15.3 %
HCT VFR BLD AUTO: 29.7 %
HCT VFR BLD AUTO: 29.7 %
HGB BLD-MCNC: 10.2 G/DL
HGB BLD-MCNC: 10.2 G/DL
LYMPHOCYTES # BLD AUTO: 1.9 K/UL
LYMPHOCYTES # BLD AUTO: 1.9 K/UL
LYMPHOCYTES NFR BLD: 79.3 %
LYMPHOCYTES NFR BLD: 79.3 %
MCH RBC QN AUTO: 38.2 PG
MCH RBC QN AUTO: 38.2 PG
MCHC RBC AUTO-ENTMCNC: 34.3 %
MCHC RBC AUTO-ENTMCNC: 34.3 %
MCV RBC AUTO: 111 FL
MCV RBC AUTO: 111 FL
MONOCYTES # BLD AUTO: 0.3 K/UL
MONOCYTES # BLD AUTO: 0.3 K/UL
MONOCYTES NFR BLD: 13.6 %
MONOCYTES NFR BLD: 13.6 %
NEUTROPHILS # BLD AUTO: 0.1 K/UL
NEUTROPHILS # BLD AUTO: 0.1 K/UL
NEUTROPHILS NFR BLD: 5.5 %
NEUTROPHILS NFR BLD: 5.5 %
OVALOCYTES BLD QL SMEAR: ABNORMAL
OVALOCYTES BLD QL SMEAR: ABNORMAL
PLATELET # BLD AUTO: 56 K/UL
PLATELET # BLD AUTO: 56 K/UL
PLATELET BLD QL SMEAR: ABNORMAL
PLATELET BLD QL SMEAR: ABNORMAL
PMV BLD AUTO: 10.7 FL
PMV BLD AUTO: 10.7 FL
POIKILOCYTOSIS BLD QL SMEAR: SLIGHT
POIKILOCYTOSIS BLD QL SMEAR: SLIGHT
RBC # BLD AUTO: 2.67 M/UL
RBC # BLD AUTO: 2.67 M/UL
WBC # BLD AUTO: 2.42 K/UL
WBC # BLD AUTO: 2.42 K/UL

## 2017-06-26 PROCEDURE — 99999 PR PBB SHADOW E&M-EST. PATIENT-LVL V: CPT | Mod: PBBFAC,,, | Performed by: SURGERY

## 2017-06-26 PROCEDURE — 36415 COLL VENOUS BLD VENIPUNCTURE: CPT

## 2017-06-26 PROCEDURE — 85025 COMPLETE CBC W/AUTO DIFF WBC: CPT

## 2017-06-26 PROCEDURE — 99214 OFFICE O/P EST MOD 30 MIN: CPT | Mod: S$GLB,,, | Performed by: SURGERY

## 2017-06-26 PROCEDURE — 93000 ELECTROCARDIOGRAM COMPLETE: CPT | Mod: S$GLB,,, | Performed by: NUCLEAR MEDICINE

## 2017-06-26 RX ORDER — SODIUM CHLORIDE 9 MG/ML
INJECTION, SOLUTION INTRAVENOUS CONTINUOUS
Status: CANCELLED | OUTPATIENT
Start: 2017-06-26

## 2017-06-26 RX ORDER — ONDANSETRON 4 MG/1
8 TABLET, ORALLY DISINTEGRATING ORAL EVERY 8 HOURS PRN
Status: CANCELLED | OUTPATIENT
Start: 2017-06-26

## 2017-06-26 NOTE — LETTER
June 26, 2017      Lavell Flor MD  [address]  [facility] [address]           [facility]  [address] [place] [address]  Phone: [phone]  Fax: [phone]          Patient: Carlos Jordan Jr.   MR Number: [id number]   YOB: 1955   Date of Visit: 6/26/2017       Dear Dr. Lavell Flor:    Thank you for referring Carlos Jordan to me for evaluation. Attached you will find relevant portions of my assessment and plan of care.    If you have questions, please do not hesitate to call me. I look forward to following Carols Jordan along with you.    Sincerely,    Jose Guadalupe Montanez MD    Enclosure  CC:  No Recipients    If you would like to receive this communication electronically, please contact [email or web address] or [phone] to request more information on [employer] Link access.    For providers and/or their staff who would like to refer a patient to [facility], please contact us through our one-stop-shop provider referral line, [facility], at [phone].    If you feel you have received this communication in error or would no longer like to receive these types of communications, please e-mail [email or web address]

## 2017-06-26 NOTE — PATIENT INSTRUCTIONS
"MediPort placement is scheduled for 11:00 on Friday, June 30.    The hospital will call you with a time for arrival.    No solid food after midnight on Thursday but you may have clear liquids up to 3 hours before your surgery.    It is okay to have black coffee without cream, creamer or sugar in it.  You must drink this at least 3 hours before your surgery.    If your platelet counts are low on the day of the procedure we may need to give you platelet      [facility]  Instructions for Patients and Families    You are invited to share your experience with me and my staff.  If you receive a survey in the mail, please return it at your convenience, or complete a brief survey on [employer].  We value your opinion!        Did you know that [facility] can be used to make appointments?  Just select "Schedule Appointment" under the "Visits" menu.    Notify you if any of your preop laboratories show abnormalities.  I    Before surgery:  The pre-op nurse from the hospital will call you before the day of your surgery to confirm your arrival time.  The time of your surgery may change due to emergencies or other unforeseen events.  Do not eat or drink anything after midnight the night before your procedure, except for clear liquids up to three hours before your surgery time, and sips of water with medication.  If you are not diabetic, it is recommended that you drink a glass of clear fruit juice (apple, grape, cranberry, not orange) three hours before your surgery time, but nothing after that.  If you are diabetic, you may have water or sugar-free clear liquids such as Crystal Light up to three hours before your surgery time.    Day of Surgery:  · You will go to a pre-op area where an IV will be started and you will speak to the anesthesiologist and surgeon.  · Your family will be updated throughout the operation.  After surgery, your family member may be taken to a private room for consultation " with the surgeon.  This is for the privacy of your medical information and does not necessarily mean there is anything wrong.    If your incisions have:  · Glue:  You may take a bath or shower immediately and wash your skin as you normally do.  The glue will eventually crumble or peel off. Do not let your incisions soak under water.  · Strips: Leave them on, but it is OK if they fall off on their own. It is OK to get them wet 48 hours after surgery.  · Bandage: You may remove it 2 days after your surgery, and then you may leave the incision open and take a shower or bath, unless otherwise instructed. If your bandage has clear plastic, you may shower with it on and then remove it 2 days after surgery.    Activities  · Walking is recommended after surgery; bed rest is not recommended unless specifically ordered.  · If you have had abdominal surgery, do not lift over 20 pounds for 4 weeks after surgery.  · If you have had hernia surgery, do not lift over 20 pounds for 6 weeks after surgery.  · You may drive when you are off your post-operative pain medication.  · Do not smoke after surgery, it decreases your ability to heal and increases the risk of infection and pneumonia.    Diet:  Drink lots of fluids after surgery.  You might not have much of an appetite at first, you may eat regular food when you feel ready, unless you are given special diet instructions.    Post-operative symptoms and medications  · It is safe to take over-the-counter medications for constipation, heartburn, sleep, or itching if needed.  Prescription pain medication may contain acetaminophen (Tylenol), so you should not take additional acetaminophen (Tylenol) at the same time as your pain medication.  · You may experience nausea, low fever/chills, and clear drainage from your incision, sometimes up to a month after surgery.  Notify our office if you have fever over 101 degrees, worsening redness around your incision, thick cloudy drainage, or  "inability to drink any liquids.  · You will experience some level of pain after surgery.  Your pain medication should help with the pain, but may not be able to eliminate it entirely.  Pain will decrease with time, and most pain will be gone by 4 to 6 weeks after surgery.  · We are not able to call in prescriptions for pain medication after hours or on weekends.  If your pain medication is ineffective or you will run out soon and need a refill, please call our office at [phone].  We are not able to replace pain medication that has been lost or stolen.    After surgery, you will either be discharged home or admitted to the hospital.  If you are admitted to the hospital, one of the surgeons or a physician assistant will see you once a day.  Due to scheduled surgery, we may see you in the afternoon or at night; however, your nurse is able to page us at any time.  If you feel there is a situation that is not being addressed properly, please dial [phone] from the phone in your room.    Follow-up appointment  · You will see your surgeon or a physician assistant in clinic for a follow-up appointment at either our [facility] (off [facility]) or [facility] (off [facility]) locations, usually between one and four weeks after your surgery.  · The hospital nurses can make your follow up appointment, or you can make it online at [email or web address] or call [phone].  · If you have a smartphone with the [employer] yoselin, please let us know if you would like to do a phone visit instead of a post-op office visit.    If you are signed up for [facility], install the "[employer]" yoselin to access your test results, send messages to your doctors, and schedule appointments from your smartphone!    "

## 2017-06-26 NOTE — PROGRESS NOTES
Patient ID: Carlos Jordan Jr. is a 62 y.o. male.      Acute myelocytic leukemia    Chief Complaint: Consult (Mediport placement)      HPI:  Patient requires a MediPort for treatment of acute myelocytic leukemia.  He was diagnosed through bone marrow biopsy he has been relatively thrombocytopenic with platelet counts around the 50 range.        Review of Systems   Constitutional: Positive for fatigue.   HENT: Negative.    Eyes: Negative.    Respiratory: Positive for shortness of breath.    Cardiovascular: Negative.    Gastrointestinal: Negative.    Endocrine: Negative.    Genitourinary: Negative.    Musculoskeletal: Negative.    Skin: Negative.    Allergic/Immunologic: Negative.    Neurological: Negative.    Hematological: Negative.    Psychiatric/Behavioral: Negative.        Current Outpatient Prescriptions   Medication Sig Dispense Refill    ondansetron (ZOFRAN) 8 MG tablet Take 1 tablet (8 mg total) by mouth every 12 (twelve) hours as needed for Nausea. 30 tablet 2    valacyclovir (VALTREX) 500 MG tablet Take 1 tablet (500 mg total) by mouth once daily. (Patient taking differently: Take 500 mg by mouth daily as needed. ) 30 tablet 3     No current facility-administered medications for this visit.        Review of patient's allergies indicates:  No Known Allergies    Past Medical History:   Diagnosis Date    AML (acute myeloblastic leukemia) 11/04/2016    non -M3     Encounter for blood transfusion     Hypertension     Stroke     R side weakness       Past Surgical History:   Procedure Laterality Date    partial amputation middle finger Right        Family History   Problem Relation Age of Onset    Diabetes Father        Social History     Social History    Marital status:      Spouse name: N/A    Number of children: N/A    Years of education: N/A     Occupational History    Not on file.     Social History Main Topics    Smoking status: Current Every Day Smoker     Packs/day: 0.50     Years:  42.00     Types: Cigarettes     Last attempt to quit: 11/3/2016    Smokeless tobacco: Never Used      Comment: no smoking after midnight prior to surgery    Alcohol use No    Drug use: No    Sexual activity: Not on file     Other Topics Concern    Not on file     Social History Narrative    Lives alone       Vitals:    06/26/17 1150   BP: 121/79   Pulse: 84   Temp: 98.1 °F (36.7 °C)       Physical Exam   Constitutional: He is oriented to person, place, and time. No distress.   Slightly frail   HENT:   Head: Normocephalic and atraumatic.   Nicotine stained mustache and beard   Neck: Normal range of motion. Neck supple.   Cardiovascular: Regular rhythm.    Irregularly irregular rhythm   Pulmonary/Chest: Effort normal.   Abdominal: Soft. Bowel sounds are normal. He exhibits distension. There is tenderness.   Musculoskeletal:   Missing the tip of the right middle finger   Neurological: He is alert and oriented to person, place, and time.   Skin: Skin is warm and dry.   Psychiatric: He has a normal mood and affect. His behavior is normal. Thought content normal.     Laboratories, x-rays were reviewed, EKG as needed    Assessment & Plan:      MediPort placement.    CBC just prior to surgery to check platelet count.  If the patient is thrombocytopenic, less than 50,000 unit platelets prior to procedure.    The risks of the procedure was discussed with him.  The risk of blood transfusion was discussed        The need for MediPort placement was discussed. The risks,benefits, and potential complications were discussed.  This includes infection, bleeding, Pneumothorax, hemothorax, injury to the great vessels, loss of the Guidewire or catheter.  I also discussed embolism of air or fat, pericardial tamponade, narrowing or stenosis of the vessels and infection of the port site.    Complete list of complications were reviewed and are listed on the consent form.  Informed consent was obtained.  Surgery  was scheduled.   Preoperative instructions were given

## 2017-06-28 RX ORDER — NAPROXEN SODIUM 220 MG/1
81 TABLET, FILM COATED ORAL DAILY
COMMUNITY

## 2017-06-28 NOTE — PRE ADMISSION SCREENING
Pre op instructions reviewed with patient per phone:    To confirm, Your surgeon has instructed you:  Surgery is scheduled 6/30/2017 at 1115.      Please report to [facility] 1st floor main lobby by 0933.      INSTRUCTIONS IMPORTANT!!!  ¨ May have clear liquids up until three hours before your surgery. OK to brush teeth, no gum, candy or mints!    ¨ Take only these medicines with a small swallow of water-morning of surgery.  None    ____  Do not wear makeup, including mascara.  ____  No powder, lotions or creams to surgical area.  ____  Please remove all jewelry, including piercings and leave at home.  ____  No money or valuables needed. Please leave at home.  ____  Please bring identification and insurance information to hospital.  ____  If going home the same day, arrange for a ride home. You will not be able to   drive if Anesthesia was used.  ____  Children, under 12 years old, must remain in the waiting room with an adult.  They are not allowed in patient areas.  ____  Wear loose fitting clothing. Allow for dressings, bandages.  ____  Stop Aspirin, Ibuprofen, Motrin and Aleve at least 5-7 days before surgery, unless otherwise instructed by your doctor, or the nurse.   You MAY use Tylenol/acetaminophen until day of surgery.  ____  If you take diabetic medication, do not take am of surgery unless instructed by   Doctor.  ____ Stop taking any Fish Oil supplement or any Vitamins that contain Vitamin E at least 5 days prior to surgery.          Bathing Instructions-- The night before surgery and the morning prior to coming to the hospital:   -Do not shave the surgical area.   -Shower and wash your hair and body as usual with anti-bacterial  soap and shampoo.   -Rinse your hair and body completely.   -Use one packet of hibiclens to wash the surgical site (using your hand) gently for 5 minutes.  Do not scrub you skin too hard.   -Do not use hibiclens on your head, face, or genitals.   -Do not  wash with anti-bacterial soap after you use the hibiclens.   -Rinse your body thoroughly.   -Dry with clean, soft towel.  Do not use lotion, cream, deodorant, or powders on   the surgical site.    Use antibacterial soap in place of hibiclens if your surgery is on the head, face or genitals.         Surgical Site Infection    Prevention of surgical site infections:     -Keep incisions clean and dry.   -Do not soak/submerge incisions in water until completely healed.   -Do not apply lotions, powders, creams, or deodorants to site.   -Always make sure hands are cleaned with antibacterial soap/ alcohol-based   prior to touching the surgical site.  (This includes doctors, nurses, staff, and yourself.)    Signs and symptoms:   -Redness and pain around the area where you had surgery   -Drainage of cloudy fluid from your surgical wound   -Fever over 100.4  I have read or had read and explained to me, and understand the above information.

## 2017-06-29 ENCOUNTER — ANESTHESIA EVENT (OUTPATIENT)
Dept: SURGERY | Facility: HOSPITAL | Age: 62
End: 2017-06-29
Payer: MEDICARE

## 2017-06-29 ENCOUNTER — OFFICE VISIT (OUTPATIENT)
Dept: HEMATOLOGY/ONCOLOGY | Facility: CLINIC | Age: 62
End: 2017-06-29
Payer: MEDICARE

## 2017-06-29 DIAGNOSIS — C92.02 AML (ACUTE MYELOID LEUKEMIA) IN RELAPSE: Primary | ICD-10-CM

## 2017-06-29 DIAGNOSIS — D69.6 THROMBOCYTOPENIA: Primary | ICD-10-CM

## 2017-06-29 PROCEDURE — 99999 PR PBB SHADOW E&M-EST. PATIENT-LVL I: CPT | Mod: PBBFAC,,, | Performed by: INTERNAL MEDICINE

## 2017-06-29 PROCEDURE — 99214 OFFICE O/P EST MOD 30 MIN: CPT | Mod: 25,S$GLB,, | Performed by: INTERNAL MEDICINE

## 2017-06-29 RX ORDER — HYDROCODONE BITARTRATE AND ACETAMINOPHEN 500; 5 MG/1; MG/1
TABLET ORAL ONCE
Status: CANCELLED | OUTPATIENT
Start: 2017-06-29 | End: 2017-06-29

## 2017-06-29 RX ORDER — DIPHENHYDRAMINE HYDROCHLORIDE 50 MG/ML
25 INJECTION INTRAMUSCULAR; INTRAVENOUS
Status: CANCELLED | OUTPATIENT
Start: 2017-06-29

## 2017-06-29 NOTE — PROGRESS NOTES
Subjective:       Patient ID: Carlos Jordan Jr. is a 62 y.o. male.    Chief Complaint: Follow-up    HPI  Review of Systems   Constitutional: Negative.  Negative for appetite change, fatigue and unexpected weight change.   Eyes: Negative.  Negative for visual disturbance.   Respiratory: Negative for cough and shortness of breath.    Cardiovascular: Negative.  Negative for chest pain.   Gastrointestinal: Negative for abdominal pain, diarrhea and nausea.   Genitourinary: Negative.  Negative for frequency and hematuria.   Musculoskeletal: Negative.  Negative for back pain.   Skin: Negative.  Negative for rash.   Neurological: Negative.  Negative for headaches.   Hematological: Negative for adenopathy.   Psychiatric/Behavioral: Negative.  The patient is not nervous/anxious.      This is a 62 year-old gentleman who comes for follow up of his AML.  He was found to have pancytopenia during a routine cbc done at the ER because of other complains.  A bone marrow was done on 11/4/2016. 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.  He said he was not interested in discussing a stem cell transplant .  He said he would prefer to be treated with a low toxicity agent like Vidaza which would allow him to be treated as an outpatient.  He had a bone marrow after  6 cycles of VIDAZA  The bone marrow shows persistent AML with 90% celullarity and 41% blasts  He will start DACOGEN as 3rd line on July 10. He is due for a medi-port tomorrow and comes to recheck his counts     ALLERGIES: None.     MEDICATIONS: None.     PREVIOUS SURGERIES: Amputation of the tip of the right middle finger.     SOCIAL HISTORY: , has one child. He lives in [place]. He smokes half a  pack a day and has been doing this for 35 years. Denies  significant drinking.   He is retired.     FAMILY HISTORY: Brother had brain tumor. Father had diabetes. No heart   attacks.     PAST MEDICAL HISTORY:  1. AML  2. Tobacco use.    3-thrombocytopenia  Objective:      Physical Exam   Constitutional: He is oriented to person, place, and time. He appears well-developed and well-nourished.   HENT:   Head: Normocephalic.   Mouth/Throat: No oropharyngeal exudate.   Eyes: Pupils are equal, round, and reactive to light.   Neck: No thyromegaly present.   Cardiovascular: Normal rate, regular rhythm and normal heart sounds.  Exam reveals no gallop.    No murmur heard.  Pulmonary/Chest: No respiratory distress. He has no wheezes. He has no rales.   Abdominal: Soft. Bowel sounds are normal. He exhibits no distension and no mass. There is no rebound and no guarding.   Musculoskeletal: Normal range of motion. He exhibits no edema.   Lymphadenopathy:     He has no cervical adenopathy.   Neurological: He is alert and oriented to person, place, and time.   Skin: Skin is warm and dry.   Psychiatric: He has a normal mood and affect. His behavior is normal.       Wt Readings from Last 3 Encounters:   06/26/17 72.6 kg (160 lb 0.9 oz)   06/19/17 73.2 kg (161 lb 6 oz)   06/13/17 72.6 kg (160 lb)     Temp Readings from Last 3 Encounters:   06/26/17 98.1 °F (36.7 °C) (Oral)   06/19/17 97.5 °F (36.4 °C) (Oral)   06/13/17 98.2 °F (36.8 °C) (Temporal)     BP Readings from Last 3 Encounters:   06/26/17 121/79   06/19/17 114/69   06/13/17 117/67     Pulse Readings from Last 3 Encounters:   06/26/17 84   06/19/17 84   06/13/17 76       Assessment:       1. AML (acute myeloid leukemia) in relapse      2-thrombcoytopenia  Plan:       Lab Results   Component Value Date    WBC 2.64 (L) 06/29/2017    HGB 9.6 (L) 06/29/2017    HCT 28.6 (L) 06/29/2017     (H) 06/29/2017    PLT 42 (L) 06/29/2017       He will have platelets transfused prior to the Mediport tomorrow.  See me Monday July 10th with a  cbc to start DACOGEN     Complex case requiring discussing case with surgeon .

## 2017-06-30 ENCOUNTER — HOSPITAL ENCOUNTER (OUTPATIENT)
Facility: HOSPITAL | Age: 62
Discharge: HOME | End: 2017-06-30
Attending: SURGERY | Admitting: SURGERY
Payer: MEDICARE

## 2017-06-30 ENCOUNTER — SURGERY (OUTPATIENT)
Age: 62
End: 2017-06-30

## 2017-06-30 ENCOUNTER — INFUSION (OUTPATIENT)
Dept: INFUSION THERAPY | Facility: HOSPITAL | Age: 62
End: 2017-06-30
Attending: INTERNAL MEDICINE
Payer: MEDICARE

## 2017-06-30 ENCOUNTER — ANESTHESIA (OUTPATIENT)
Dept: SURGERY | Facility: HOSPITAL | Age: 62
End: 2017-06-30
Payer: MEDICARE

## 2017-06-30 VITALS
DIASTOLIC BLOOD PRESSURE: 69 MMHG | SYSTOLIC BLOOD PRESSURE: 122 MMHG | RESPIRATION RATE: 20 BRPM | HEART RATE: 84 BPM | TEMPERATURE: 98 F

## 2017-06-30 VITALS
SYSTOLIC BLOOD PRESSURE: 104 MMHG | OXYGEN SATURATION: 96 % | WEIGHT: 160 LBS | RESPIRATION RATE: 20 BRPM | TEMPERATURE: 98 F | HEART RATE: 85 BPM | BODY MASS INDEX: 24.25 KG/M2 | DIASTOLIC BLOOD PRESSURE: 58 MMHG | HEIGHT: 68 IN

## 2017-06-30 DIAGNOSIS — C92.00 ACUTE MYELOID LEUKEMIA NOT HAVING ACHIEVED REMISSION: Primary | ICD-10-CM

## 2017-06-30 DIAGNOSIS — D69.6 THROMBOCYTOPENIA: ICD-10-CM

## 2017-06-30 DIAGNOSIS — I48.91 ATRIAL FIBRILLATION, CONTROLLED: ICD-10-CM

## 2017-06-30 PROBLEM — Z95.828 PORT CATHETER IN PLACE: Status: ACTIVE | Noted: 2017-06-30

## 2017-06-30 LAB
ABO + RH BLD: NORMAL
BLD PROD TYP BPU: NORMAL
BLOOD UNIT EXPIRATION DATE: NORMAL
BLOOD UNIT TYPE CODE: 600
BLOOD UNIT TYPE: NORMAL
CODING SYSTEM: NORMAL
DISPENSE STATUS: NORMAL
NUM UNITS TRANS WBC-POOR PLATPHERESIS: NORMAL

## 2017-06-30 PROCEDURE — 36415 COLL VENOUS BLD VENIPUNCTURE: CPT

## 2017-06-30 PROCEDURE — 96376 TX/PRO/DX INJ SAME DRUG ADON: CPT

## 2017-06-30 PROCEDURE — 36430 TRANSFUSION BLD/BLD COMPNT: CPT

## 2017-06-30 PROCEDURE — 37000008 HC ANESTHESIA 1ST 15 MINUTES: Performed by: SURGERY

## 2017-06-30 PROCEDURE — 96374 THER/PROPH/DIAG INJ IV PUSH: CPT

## 2017-06-30 PROCEDURE — P9037 PLATE PHERES LEUKOREDU IRRAD: HCPCS

## 2017-06-30 PROCEDURE — 25000003 PHARM REV CODE 250: Performed by: NURSE ANESTHETIST, CERTIFIED REGISTERED

## 2017-06-30 PROCEDURE — 63600175 PHARM REV CODE 636 W HCPCS: Performed by: INTERNAL MEDICINE

## 2017-06-30 PROCEDURE — 86901 BLOOD TYPING SEROLOGIC RH(D): CPT

## 2017-06-30 PROCEDURE — 36000706: Performed by: SURGERY

## 2017-06-30 PROCEDURE — 86900 BLOOD TYPING SEROLOGIC ABO: CPT

## 2017-06-30 PROCEDURE — 71000015 HC POSTOP RECOV 1ST HR: Performed by: SURGERY

## 2017-06-30 PROCEDURE — 63600175 PHARM REV CODE 636 W HCPCS: Performed by: SURGERY

## 2017-06-30 PROCEDURE — 25000003 PHARM REV CODE 250: Performed by: SURGERY

## 2017-06-30 PROCEDURE — 71000033 HC RECOVERY, INTIAL HOUR: Performed by: SURGERY

## 2017-06-30 PROCEDURE — 96361 HYDRATE IV INFUSION ADD-ON: CPT

## 2017-06-30 PROCEDURE — C1788 PORT, INDWELLING, IMP: HCPCS | Performed by: SURGERY

## 2017-06-30 PROCEDURE — 63600175 PHARM REV CODE 636 W HCPCS: Performed by: NURSE ANESTHETIST, CERTIFIED REGISTERED

## 2017-06-30 PROCEDURE — 37000009 HC ANESTHESIA EA ADD 15 MINS: Performed by: SURGERY

## 2017-06-30 PROCEDURE — 36000707: Performed by: SURGERY

## 2017-06-30 DEVICE — PORT POWER CLEAR VIEW: Type: IMPLANTABLE DEVICE | Site: SUBCLAVIAN | Status: FUNCTIONAL

## 2017-06-30 RX ORDER — SODIUM CHLORIDE 0.9 % (FLUSH) 0.9 %
3 SYRINGE (ML) INJECTION EVERY 8 HOURS
Status: DISCONTINUED | OUTPATIENT
Start: 2017-06-30 | End: 2017-06-30 | Stop reason: HOSPADM

## 2017-06-30 RX ORDER — MIDAZOLAM HYDROCHLORIDE 1 MG/ML
INJECTION, SOLUTION INTRAMUSCULAR; INTRAVENOUS
Status: DISCONTINUED | OUTPATIENT
Start: 2017-06-30 | End: 2017-06-30

## 2017-06-30 RX ORDER — CEFAZOLIN SODIUM 2 G/50ML
2 SOLUTION INTRAVENOUS
Status: COMPLETED | OUTPATIENT
Start: 2017-06-30 | End: 2017-06-30

## 2017-06-30 RX ORDER — HYDROMORPHONE HYDROCHLORIDE 2 MG/ML
0.2 INJECTION, SOLUTION INTRAMUSCULAR; INTRAVENOUS; SUBCUTANEOUS EVERY 5 MIN PRN
Status: DISCONTINUED | OUTPATIENT
Start: 2017-06-30 | End: 2017-06-30 | Stop reason: HOSPADM

## 2017-06-30 RX ORDER — SODIUM CHLORIDE 9 MG/ML
INJECTION, SOLUTION INTRAVENOUS CONTINUOUS
Status: DISCONTINUED | OUTPATIENT
Start: 2017-06-30 | End: 2017-06-30 | Stop reason: HOSPADM

## 2017-06-30 RX ORDER — HYDROMORPHONE HYDROCHLORIDE 2 MG/ML
1 INJECTION, SOLUTION INTRAMUSCULAR; INTRAVENOUS; SUBCUTANEOUS EVERY 4 HOURS PRN
Status: DISCONTINUED | OUTPATIENT
Start: 2017-06-30 | End: 2017-06-30 | Stop reason: HOSPADM

## 2017-06-30 RX ORDER — LIDOCAINE HCL/PF 100 MG/5ML
SYRINGE (ML) INTRAVENOUS
Status: DISCONTINUED | OUTPATIENT
Start: 2017-06-30 | End: 2017-06-30

## 2017-06-30 RX ORDER — ONDANSETRON 8 MG/1
8 TABLET, ORALLY DISINTEGRATING ORAL EVERY 8 HOURS PRN
Status: DISCONTINUED | OUTPATIENT
Start: 2017-06-30 | End: 2017-06-30 | Stop reason: HOSPADM

## 2017-06-30 RX ORDER — DIPHENHYDRAMINE HYDROCHLORIDE 50 MG/ML
25 INJECTION INTRAMUSCULAR; INTRAVENOUS
Status: COMPLETED | OUTPATIENT
Start: 2017-06-30 | End: 2017-06-30

## 2017-06-30 RX ORDER — HYDROCODONE BITARTRATE AND ACETAMINOPHEN 500; 5 MG/1; MG/1
TABLET ORAL
Status: DISCONTINUED | OUTPATIENT
Start: 2017-06-30 | End: 2017-06-30 | Stop reason: HOSPADM

## 2017-06-30 RX ORDER — HYDROCODONE BITARTRATE AND ACETAMINOPHEN 5; 325 MG/1; MG/1
1 TABLET ORAL EVERY 6 HOURS PRN
Qty: 20 TABLET | Refills: 0 | Status: SHIPPED | OUTPATIENT
Start: 2017-06-30

## 2017-06-30 RX ORDER — BUPIVACAINE HYDROCHLORIDE 2.5 MG/ML
INJECTION, SOLUTION EPIDURAL; INFILTRATION; INTRACAUDAL
Status: DISCONTINUED | OUTPATIENT
Start: 2017-06-30 | End: 2017-06-30 | Stop reason: HOSPADM

## 2017-06-30 RX ORDER — FENTANYL CITRATE 50 UG/ML
25 INJECTION, SOLUTION INTRAMUSCULAR; INTRAVENOUS EVERY 5 MIN PRN
Status: DISCONTINUED | OUTPATIENT
Start: 2017-06-30 | End: 2017-06-30 | Stop reason: HOSPADM

## 2017-06-30 RX ORDER — FENTANYL CITRATE 50 UG/ML
INJECTION, SOLUTION INTRAMUSCULAR; INTRAVENOUS
Status: DISCONTINUED | OUTPATIENT
Start: 2017-06-30 | End: 2017-06-30

## 2017-06-30 RX ORDER — LIDOCAINE HYDROCHLORIDE 10 MG/ML
1 INJECTION, SOLUTION EPIDURAL; INFILTRATION; INTRACAUDAL; PERINEURAL ONCE
Status: DISCONTINUED | OUTPATIENT
Start: 2017-06-30 | End: 2017-06-30 | Stop reason: HOSPADM

## 2017-06-30 RX ORDER — LIDOCAINE HYDROCHLORIDE AND EPINEPHRINE 10; 10 MG/ML; UG/ML
INJECTION, SOLUTION INFILTRATION; PERINEURAL
Status: DISCONTINUED | OUTPATIENT
Start: 2017-06-30 | End: 2017-06-30 | Stop reason: HOSPADM

## 2017-06-30 RX ORDER — ONDANSETRON 2 MG/ML
INJECTION INTRAMUSCULAR; INTRAVENOUS
Status: DISCONTINUED | OUTPATIENT
Start: 2017-06-30 | End: 2017-06-30

## 2017-06-30 RX ORDER — SODIUM CHLORIDE, SODIUM LACTATE, POTASSIUM CHLORIDE, CALCIUM CHLORIDE 600; 310; 30; 20 MG/100ML; MG/100ML; MG/100ML; MG/100ML
INJECTION, SOLUTION INTRAVENOUS CONTINUOUS PRN
Status: DISCONTINUED | OUTPATIENT
Start: 2017-06-30 | End: 2017-06-30

## 2017-06-30 RX ORDER — PROPOFOL 10 MG/ML
VIAL (ML) INTRAVENOUS
Status: DISCONTINUED | OUTPATIENT
Start: 2017-06-30 | End: 2017-06-30

## 2017-06-30 RX ORDER — DEXAMETHASONE SODIUM PHOSPHATE 4 MG/ML
INJECTION, SOLUTION INTRA-ARTICULAR; INTRALESIONAL; INTRAMUSCULAR; INTRAVENOUS; SOFT TISSUE
Status: DISCONTINUED | OUTPATIENT
Start: 2017-06-30 | End: 2017-06-30

## 2017-06-30 RX ORDER — MEPERIDINE HYDROCHLORIDE 50 MG/ML
12.5 INJECTION INTRAMUSCULAR; INTRAVENOUS; SUBCUTANEOUS ONCE AS NEEDED
Status: DISCONTINUED | OUTPATIENT
Start: 2017-06-30 | End: 2017-06-30 | Stop reason: HOSPADM

## 2017-06-30 RX ORDER — HYDROCODONE BITARTRATE AND ACETAMINOPHEN 5; 325 MG/1; MG/1
1 TABLET ORAL EVERY 4 HOURS PRN
Status: DISCONTINUED | OUTPATIENT
Start: 2017-06-30 | End: 2017-06-30 | Stop reason: HOSPADM

## 2017-06-30 RX ORDER — HYDROCODONE BITARTRATE AND ACETAMINOPHEN 500; 5 MG/1; MG/1
TABLET ORAL ONCE
Status: DISCONTINUED | OUTPATIENT
Start: 2017-06-30 | End: 2017-06-30 | Stop reason: HOSPADM

## 2017-06-30 RX ORDER — SODIUM CHLORIDE 0.9 % (FLUSH) 0.9 %
3 SYRINGE (ML) INJECTION
Status: DISCONTINUED | OUTPATIENT
Start: 2017-06-30 | End: 2017-06-30 | Stop reason: HOSPADM

## 2017-06-30 RX ORDER — HEPARIN 100 UNIT/ML
SYRINGE INTRAVENOUS
Status: DISCONTINUED | OUTPATIENT
Start: 2017-06-30 | End: 2017-06-30 | Stop reason: HOSPADM

## 2017-06-30 RX ADMIN — LIDOCAINE HYDROCHLORIDE 40 MG: 20 INJECTION, SOLUTION INTRAVENOUS at 12:06

## 2017-06-30 RX ADMIN — PROPOFOL 30 MG: 10 INJECTION, EMULSION INTRAVENOUS at 12:06

## 2017-06-30 RX ADMIN — FENTANYL CITRATE 50 MCG: 50 INJECTION, SOLUTION INTRAMUSCULAR; INTRAVENOUS at 12:06

## 2017-06-30 RX ADMIN — DEXAMETHASONE SODIUM PHOSPHATE 4 MG: 4 INJECTION, SOLUTION INTRA-ARTICULAR; INTRALESIONAL; INTRAMUSCULAR; INTRAVENOUS; SOFT TISSUE at 01:06

## 2017-06-30 RX ADMIN — PROPOFOL 30 MG: 10 INJECTION, EMULSION INTRAVENOUS at 01:06

## 2017-06-30 RX ADMIN — SODIUM CHLORIDE, SODIUM LACTATE, POTASSIUM CHLORIDE, AND CALCIUM CHLORIDE: 600; 310; 30; 20 INJECTION, SOLUTION INTRAVENOUS at 12:06

## 2017-06-30 RX ADMIN — CEFAZOLIN SODIUM 2 G: 2 SOLUTION INTRAVENOUS at 12:06

## 2017-06-30 RX ADMIN — FENTANYL CITRATE 50 MCG: 50 INJECTION, SOLUTION INTRAMUSCULAR; INTRAVENOUS at 01:06

## 2017-06-30 RX ADMIN — PROPOFOL 40 MG: 10 INJECTION, EMULSION INTRAVENOUS at 12:06

## 2017-06-30 RX ADMIN — HYDROCORTISONE SODIUM SUCCINATE 50 MG: 100 INJECTION, POWDER, FOR SOLUTION INTRAMUSCULAR; INTRAVENOUS at 09:06

## 2017-06-30 RX ADMIN — ONDANSETRON 4 MG: 2 INJECTION, SOLUTION INTRAMUSCULAR; INTRAVENOUS at 01:06

## 2017-06-30 RX ADMIN — HEPARIN 300 UNITS: 100 SYRINGE at 01:06

## 2017-06-30 RX ADMIN — LIDOCAINE HYDROCHLORIDE AND EPINEPHRINE 30 ML: 10; 10 INJECTION, SOLUTION INFILTRATION; PERINEURAL at 01:06

## 2017-06-30 RX ADMIN — DIPHENHYDRAMINE HYDROCHLORIDE 25 MG: 50 INJECTION, SOLUTION INTRAMUSCULAR; INTRAVENOUS at 09:06

## 2017-06-30 RX ADMIN — MIDAZOLAM HYDROCHLORIDE 2 MG: 1 INJECTION, SOLUTION INTRAMUSCULAR; INTRAVENOUS at 12:06

## 2017-06-30 RX ADMIN — BUPIVACAINE HYDROCHLORIDE 10 ML: 2.5 INJECTION, SOLUTION EPIDURAL; INFILTRATION; INTRACAUDAL; PERINEURAL at 01:06

## 2017-06-30 NOTE — PLAN OF CARE
Escorted from transfusion room to preop via wheelchair and accompanied by rn. Pt ambulated in preop and to bathroom without weakness or difficulty.

## 2017-06-30 NOTE — ANESTHESIA POSTPROCEDURE EVALUATION
"Anesthesia Post Evaluation    Patient: Carlos Jordan Jr.    Procedure(s) Performed: Procedure(s) (LRB):  [id number] (Right)    Final Anesthesia Type: general  Patient location during evaluation: PACU  Patient participation: Yes- Able to Participate  Level of consciousness: awake and alert  Post-procedure vital signs: reviewed and stable  Pain management: adequate  Airway patency: patent  PONV status at discharge: No PONV  Anesthetic complications: no      Cardiovascular status: blood pressure returned to baseline  Respiratory status: unassisted and spontaneous ventilation  Hydration status: euvolemic  Follow-up not needed.        Visit Vitals  BP (!) 104/58 (BP Location: Right arm, Patient Position: Lying, BP Method: Automatic)   Pulse 85   Temp 36.7 °C (98 °F) (Temporal)   Resp 20   Ht 5' 8" (1.727 m)   Wt 72.6 kg (160 lb)   SpO2 96%   BMI 24.33 kg/m²       Pain/Luis Score: Pain Assessment Performed: Yes (6/30/2017  2:05 PM)  Presence of Pain: denies (6/30/2017  2:05 PM)  Luis Score: 8 (6/30/2017  1:45 PM)      "

## 2017-06-30 NOTE — ANESTHESIA PREPROCEDURE EVALUATION
06/30/2017  Carlos Jordan Jr. is a 62 y.o., male.    Anesthesia Evaluation    I have reviewed the Patient Summary Reports.    I have reviewed the Nursing Notes.   I have reviewed the Medications.     Review of Systems  Anesthesia Hx:  Denies Family Hx of Anesthesia complications.   Denies Personal Hx of Anesthesia complications.   Hematology/Oncology:         -- Anemia: -- Leukemia: Acute Myeloid Leukemia (AML)   -- Transfusion: -- Transfusion Hx (no complications): s/p Platelet transfusion, within 24 hours    Cardiovascular:   Hypertension Dysrhythmias atrial fibrillation    Neurological:   CVA, no residual symptoms        Physical Exam  General:  Cachexia    Airway/Jaw/Neck:  Airway Findings: Mouth Opening: Normal Tongue: Normal  Mallampati: III  Improves to II with phonation.  TM Distance: Normal, at least 6 cm       Chest/Lungs:  Chest/Lungs Findings: Clear to auscultation, Normal Respiratory Rate     Heart/Vascular:  Heart Findings: Rate: Normal  Rhythm: Regular Rhythm        Mental Status:  Mental Status Findings:  Alert and Oriented         Anesthesia Plan  Type of Anesthesia, risks & benefits discussed:  Anesthesia Type:  MAC, general  Patient's Preference:   Intra-op Monitoring Plan:   Intra-op Monitoring Plan Comments:   Post Op Pain Control Plan:   Post Op Pain Control Plan Comments:   Induction:   IV  Beta Blocker:  Patient is not currently on a Beta-Blocker (No further documentation required).       Informed Consent: Patient understands risks and agrees with Anesthesia plan.  Questions answered. Anesthesia consent signed with patient.  ASA Score: 3     Day of Surgery Review of History & Physical:    H&P update referred to the surgeon.         Ready For Surgery From Anesthesia Perspective.

## 2017-06-30 NOTE — PLAN OF CARE
Pt denies pain at present. Respirations even and unlabored on room air and tolerating well. Rt chest wall dsg remains c/d/i. VSS. See flow sheet for detailed assessment. Will cont to monitor.

## 2017-06-30 NOTE — OP NOTE
Operative Note       Surgery Date: 6/30/2017     Surgeon(s) and Role:     * Jose Guadalupe Montanez MD - Primary    Pre-op Diagnosis:  Atrial fibrillation, controlled [I48.91]    Post-op Diagnosis: Post-Op Diagnosis Codes:     * Atrial fibrillation, controlled [I48.91]    Procedure(s) (LRB):  [id number] (Right)    Anesthesia: Local MAC    Procedure in Detail/Findings:    The patient was placed supine . SCDs were placed for DVT prophylaxis and antibiotics were given  before the incision. SCD on the lower extremities.  The upper chest and neck were prepped and draped with sterile technique.       A time out was completed.    The central catheter was flushed with heparin to ensure function. Landmarks were identified and a skin entry site was chosen 1-2 cm inferior to the distal third of the clavicle. The right chest  wall skin and subcutaneous tissue were anesthetized with lidocaine. Local anesthesia was carried down to the periosteum of the clavicle.     The patient's head was turned in the contralateral direction and, as the ipsilateral arm was retracted caudad.  The bed was placed in slight Trendelenburg position.  The right subclavian vein was then located with the needle and a 10-mL syringe. The needle was inserted at the chosen site with the bevel down and directed toward the sternal notch. With continuous negative pressure in the syringe, the needle was advanced in a stepwise fashion down the clavicle. The vein was located as the needle passed beneath the bone. As soon as the syringe began to fill with venous blood, the needle was rotated 90 degrees so the bevel was pointed at the foot of the bed. The needle position was secured and the syringe removed. The hub was occluded to prevent venous air embolus. The guide wire passed easily, and its position in the superior vena cava was confirmed by fluoroscopy. The needle was removed while the wire was held in place.     . A 3-cm incision was made on the anterior  chest wall, and electrocautery was used to create a subcutaneous pocket for the port.     The dilator/introducer was placed over the guidewire and advanced into the superior Vena cava.  The dilator and guidewire were removed.  The MediPort catheter was placed through the introducer and advanced into the distal superior vena cava/atrial.  The catheter was aspirated and there was brisk return of blood.  The catheter was then withdrawn under fluoroscopic visualization such that the tip was at the superior vena cava/atrial junction.  The catheter was cut and connected to the reservoir.    The port was placed in the subcutaneous pocket.  The port was secured in place in the subcutaneous pocket with 0-vicryl sutures. The port was aspirated to ensure adequate blood flow and then flushed with heparinized saline. Both skin incisions were then closed in two layers with interrupted 3-0 Vicryl sutures and running 4-0 Monocryl sutures.     The patient tolerated the procedure well and was taken to the postanesthesia care unit in stable condition.  An upright chest x-ray was ordered to document location of the catheter tip and check for pneumothorax.     Estimated Blood Loss: 10 mL           Specimens     None        Implants:   Implant Name Type Inv. Item Serial No.  Lot No. LRB No. Used                               Disposition: PACU - hemodynamically stable.           Condition: Stable    Attestation:  I performed the procedure.           Discharge Note    Admit Date: 6/30/2017    Attending Physician: Jose Guadalupe Montanez MD     Discharge Physician: Jose Guadalupe Montanez MD    Final Diagnosis: Post-Op Diagnosis Codes:     * Atrial fibrillation, controlled [I48.91]    Disposition: Home or Self Care    Patient Instructions:   Current Discharge Medication List      START taking these medications    Details   hydrocodone-acetaminophen 5-325mg (NORCO) 5-325 mg per tablet Take 1 tablet by mouth every 6 (six) hours as needed for  Pain.  Qty: 20 tablet, Refills: 0         CONTINUE these medications which have NOT CHANGED    Details   aspirin 81 MG Chew Take 81 mg by mouth once daily.      ondansetron (ZOFRAN) 8 MG tablet Take 1 tablet (8 mg total) by mouth every 12 (twelve) hours as needed for Nausea.  Qty: 30 tablet, Refills: 2    Associated Diagnoses: Acute myeloid leukemia not having achieved remission      valacyclovir (VALTREX) 500 MG tablet Take 1 tablet (500 mg total) by mouth once daily.  Qty: 30 tablet, Refills: 3    Associated Diagnoses: Acute myeloid leukemia not having achieved remission             Discharge Procedure Orders (must include Diet, Follow-up, Activity)    Discharge Procedure Orders (must include Diet, Follow-up, Activity)  Diet general     Call MD for:  temperature >100.4     Call MD for:  persistent nausea and vomiting     Call MD for:  severe uncontrolled pain     Call MD for:  difficulty breathing, headache or visual disturbances     Call MD for:  redness, tenderness, or signs of infection (pain, swelling, redness, odor or green/yellow discharge around incision site)     Activity as tolerated     Remove dressing in 72 hours   Order Comments: May shower with the clear dressing in place and once it is removed  Remove the steri strips when the begin to fall off          Discharge Date: No discharge date for patient encounter.

## 2017-06-30 NOTE — INTERVAL H&P NOTE
The patient has been examined and the H&P has been reviewed:    I concur with the findings and changes have been noted since the H&P was written: Patient's platelet count was 47 and he received a platelet transfusion preoperatively    Anesthesia/Surgery risks, benefits and alternative options discussed and understood by patient/family.          Active Hospital Problems    Diagnosis  POA    Atrial fibrillation, controlled [I48.91]  Yes      Resolved Hospital Problems    Diagnosis Date Resolved POA   No resolved problems to display.

## 2017-06-30 NOTE — DISCHARGE INSTRUCTIONS
Please call for any fever, increase in pain, nausea or vomiting or redness or drainage from incision(s).    May shower with the dressing on and once it is removed  Remove steri strips as they begin to fall off    If you become constipated from the pain medication you can use over the counter laxatives,  Miralax or Glycolax, or Magnesium Citrate for severe constipation.      Vascular Access Port Implantation   Port implantation is surgery to place (implant) a port under the skin. For vascular access, it is placed into a vein. The port allows medicines or nutrition to be sent right into your bloodstream. Blood can also be taken or given through the port. During the procedure, a long, thin tube called a catheter is threaded into one of your large veins. The tube is then attached to the port. This usually sits under the skin of your chest and causes a small bump. To use the port, a special needle is passed through your skin and into the port. The needle can stay in your skin for up to 7 days, if needed. A port can stay in place for weeks or months or longer.    Why is a vascular access port needed?  A vascular access port may allow healthcare providers to give you:  · Chemotherapy or other cancer-fighting drugs  · IV treatments, such as antibiotics or nutrition  · Hemodialysis (for kidney failure)  The port may also be used to draw blood.  Before the procedure  Follow any instructions you are given on how to prepare.  Tell your provider about any medicines you are taking. This includes:  · All prescription medicines  · Over-the-counter medicines such as aspirin or ibuprofen  · Herbs, vitamins, and other supplements  Also be sure your provider knows:  · If you are pregnant or think you may be pregnant  · If you are allergic to any medicines or substances, especially local anesthetics or iodine  · Your full medical history, including why you will need the port  · If you plan on doing any contact sports  During the  procedure  · Before the procedure, an IV may be put into a vein in your arm or hand. This gives you fluids and medicines. You may be given medicine through the IV to help you relax during the procedure. This is called sedation. But some surgeons place ports using general anesthesia.  · The chest is used most often for the port. In some cases, your belly (abdomen) or arm will be used instead.  · The skin over the insertion area is numbed with local anesthetic.  · Ultrasound or X-rays are used to help the healthcare provider guide the catheter into the proper location during the procedure.  · A cut (incision) is made in the skin where the port will be placed. A small pocket for the port is formed under the skin.  · A second small incision is made in the skin near the first incision. A tunnel under the skin is created. The catheter is put through the tunnel and into the blood vessel.  · The skin is closed over the port. It is held shut with stitches (sutures) or surgical glue or tape. The second small incision is also closed.  · A chest X-ray may be done to make sure the port is placed properly.  After the procedure  You may be taken to a recovery room where youll recover from the sedation. Nurses will check on you as you rest. If you have pain, nurses can give you medicine. If you are not staying in the hospital overnight, you will be sent home a few hours after the procedure is done. A healthcare provider will tell you when you can go home. An adult family member or friend will need to drive you home.  Recovering at home  · Take pain medicine as directed by your healthcare provider.  · Take it easy for 24 hours after the procedure. Avoid physical activity and heavy lifting until your healthcare provider says its OK.  · Keep the port clean and dry. Ask when you can shower again. You will need to keep the port dry by covering it when you shower.  · Care for the insertion site as you are directed.  · Dont swim,  bathe, or do other activities that cause water to cover the insertion site.  · To keep the port from getting blocked with blood clots, flush it as often as directed. You should be shown the proper way to flush the port before you go home. It is important to follow these directions.     Risks and possible complications of implantation  · Bleeding  · Infection of the insertion site  · Damage to a blood vessel  · Nerve injury or irritation  · Collapsed lung (for chest port placements)  · Skin breakdown over the port  Risks and possible complications of having a port  · Blocked  port or catheter  · Leakage or breakage of the port or catheter  · The port moves out of position  · Blood clot  · Skin or bloodstream infection  · Skin breakdown over the port      When to seek medical care  Call your healthcare provider right away if you have any of the following:  · A fever of 100.4°F (38.0°C) or higher  · You can't access or use the port properly  · You can't flush the port or get a blood return  · The skin near the port is red, warm, swollen, or broken  · You have shoulder pain on the side where the port is located  · You feel a heart flutter or racing heart   · Swollen arm, if the port is placed in your arm   Date Last Reviewed: 7/1/2016  © 1823-5989 [employer]. [address] [place] [address], [place] [address]. All rights reserved. This information is not intended as a substitute for professional medical care. Always follow your healthcare professional's instructions.    General Information:    1. Do not drink alcoholic beverages including beer for 24 hours or as long as you are on pain medication..  2. Do not drive a motor vehicle, operate machinery or power tools, or signs legal papers for 24 hours or as long as you are on pain medication.   3. You may experience light-headedness, dizziness, and sleepiness following surgery. Please do not stay alone. A responsible adult should be with you for this 24 hour  period.  4. Go home and rest.  5. Progress slowly to a normal diet unless instructed.  Otherwise, begin with liquids such as soft drinks, then soup and crackers working up to solid foods. Drink plenty of nonalcoholic fluids.  6. Certain anesthetics and pain medications produce nausea and vomiting in certain individuals. If nausea becomes a problem at home, call you doctor.  7. A nurse will be calling you sometime after surgery. Do not be alarmed. This is our way of finding out how you are doing.  8. Several times every hour while you are awake, take 2-3 deep breaths and cough. If you had stomach surgery hold a pillow or rolled towel firmly against your stomach before you cough. This will help with any pain the cough might cause.  9. Several times every hour while you are awake, pump and flex your feet 5-6 times and do foot circles. This will help prevent blood clots.  10. Call your doctor for severe pain, bleeding, fever, or signs or symptoms of infection (pain, swelling, redness, foul odor, drainage).  11. You can contact your doctor anytime by [phone] for the [facility] (at [facility]) or [phone] for the [facility] on [facility].   [email or web address] is another way to contact your doctor if you are an active participant online with [employer].      Acetaminophen; Hydrocodone tablets or capsules  What is this medicine?  ACETAMINOPHEN; HYDROCODONE (a set a SRI leslye fen; leonides droe KOE done) is a pain reliever. It is used to treat moderate to severe pain.  How should I use this medicine?  Take this medicine by mouth with a glass of water. Follow the directions on the prescription label. You can take it with or without food. If it upsets your stomach, take it with food. Do not take your medicine more often than directed.  A special MedGuide will be given to you by the pharmacist with each prescription and refill. Be sure to read this information carefully each time.  Talk to your  pediatrician regarding the use of this medicine in children. Special care may be needed.  What side effects may I notice from receiving this medicine?  Side effects that you should report to your doctor or health care professional as soon as possible:  · allergic reactions like skin rash, itching or hives, swelling of the face, lips, or tongue  · breathing problems  · confusion  · redness, blistering, peeling or loosening of the skin, including inside the mouth  · signs and symptoms of low blood pressure like dizziness; feeling faint or lightheaded, falls; unusually weak or tired  · trouble passing urine or change in the amount of urine  · yellowing of the eyes or skin  Side effects that usually do not require medical attention (report to your doctor or health care professional if they continue or are bothersome):  · constipation  · dry mouth  · nausea, vomiting  · tiredness  What may interact with this medicine?  This medicine may interact with the following medications:  · alcohol  · antiviral medicines for HIV or AIDS  · atropine  · antihistamines for allergy, cough and cold  · certain antibiotics like erythromycin, clarithromycin  · certain medicines for anxiety or sleep  · certain medicines for bladder problems like oxybutynin, tolterodine  · certain medicines for depression like amitriptyline, fluoxetine, sertraline  · certain medicines for fungal infections like ketoconazole and itraconazole  · certain medicines for Parkinson's disease like benztropine, trihexyphenidyl  · certain medicines for seizures like carbamazepine, phenobarbital, phenytoin, primidone  · certain medicines for stomach problems like dicyclomine, hyoscyamine  · certain medicines for travel sickness like scopolamine  · general anesthetics like halothane, isoflurane, methoxyflurane, propofol  · ipratropium  · local anesthetics like lidocaine, pramoxine, tetracaine  · MAOIs like Carbex, Eldepryl, Marplan, Nardil, and Parnate  · medicines that  relax muscles for surgery  · other medicines with acetaminophen  · other narcotic medicines for pain or cough  · phenothiazines like chlorpromazine, mesoridazine, prochlorperazine, thioridazine  · rifampin  What if I miss a dose?  If you miss a dose, take it as soon as you can. If it is almost time for your next dose, take only that dose. Do not take double or extra doses.  Where should I keep my medicine?  Keep out of the reach of children. This medicine can be abused. Keep your medicine in a safe place to protect it from theft. Do not share this medicine with anyone. Selling or giving away this medicine is dangerous and against the law.  This medicine may cause accidental overdose and death if it taken by other adults, children, or pets. Mix any unused medicine with a substance like cat litter or coffee grounds. Then throw the medicine away in a sealed container like a sealed bag or a coffee can with a lid. Do not use the medicine after the expiration date.  Store at room temperature between 15 and 30 degrees C (59 and 86 degrees F).  What should I tell my health care provider before I take this medicine?  They need to know if you have any of these conditions:  · brain tumor  · Crohn's disease, inflammatory bowel disease, or ulcerative colitis  · drug abuse or addiction  · head injury  · heart or circulation problems  · if you often drink alcohol  · kidney disease or problems going to the bathroom  · liver disease  · lung disease, asthma, or breathing problems  · an unusual or allergic reaction to acetaminophen, hydrocodone, other opioid analgesics, other medicines, foods, dyes, or preservatives  · pregnant or trying to get pregnant  · breast-feeding  What should I watch for while using this medicine?  Tell your doctor or health care professional if your pain does not go away, if it gets worse, or if you have new or a different type of pain. You may develop tolerance to the medicine. Tolerance means that you will  need a higher dose of the medicine for pain relief. Tolerance is normal and is expected if you take the medicine for a long time.  Do not suddenly stop taking your medicine because you may develop a severe reaction. Your body becomes used to the medicine. This does NOT mean you are addicted. Addiction is a behavior related to getting and using a drug for a non-medical reason. If you have pain, you have a medical reason to take pain medicine. Your doctor will tell you how much medicine to take. If your doctor wants you to stop the medicine, the dose will be slowly lowered over time to avoid any side effects.  There are different types of narcotic medicines (opiates). If you take more than one type at the same time or if you are taking another medicine that also causes drowsiness, you may have more side effects. Give your health care provider a list of all medicines you use. Your doctor will tell you how much medicine to take. Do not take more medicine than directed. Call emergency for help if you have problems breathing or unusual sleepiness.  Do not take other medicines that contain acetaminophen with this medicine. Always read labels carefully. If you have questions, ask your doctor or pharmacist.  If you take too much acetaminophen get medical help right away. Too much acetaminophen can be very dangerous and cause liver damage. Even if you do not have symptoms, it is important to get help right away.  You may get drowsy or dizzy. Do not drive, use machinery, or do anything that needs mental alertness until you know how this medicine affects you. Do not stand or sit up quickly, especially if you are an older patient. This reduces the risk of dizzy or fainting spells. Alcohol may interfere with the effect of this medicine. Avoid alcoholic drinks.  The medicine will cause constipation. Try to have a bowel movement at least every 2 to 3 days. If you do not have a bowel movement for 3 days, call your doctor or health  care professional.  Your mouth may get dry. Chewing sugarless gum or sucking hard candy, and drinking plenty of water may help. Contact your doctor if the problem does not go away or is severe.  Date Last Reviewed:   NOTE:This sheet is a summary. It may not cover all possible information. If you have questions about this medicine, talk to your doctor, pharmacist, or health care provider. Copyright© 2016 [employer]

## 2017-06-30 NOTE — PLAN OF CARE
"Problem: Patient Care Overview  Goal: Plan of Care Review  Patient states "feel ok, just couldn't sleep thinking about different things." Plan of care reviewed with patient. Verbalizes understanding.      "

## 2017-06-30 NOTE — TRANSFER OF CARE
"Anesthesia Transfer of Care Note    Patient: Carlos Jordan Jr.    Procedure(s) Performed: Procedure(s) (LRB):  [id number] (Right)    Patient location: PACU    Anesthesia Type: MAC    Transport from OR: Transported from OR on room air with adequate spontaneous ventilation    Post pain: adequate analgesia    Post assessment: no apparent anesthetic complications    Post vital signs: stable    Level of consciousness: responds to stimulation    Nausea/Vomiting: no nausea/vomiting    Complications: none    Transfer of care protocol was followed      Last vitals:   Visit Vitals  /70 (BP Location: Right arm, Patient Position: Sitting, BP Method: Automatic)   Pulse 85   Temp 36.6 °C (97.8 °F) (Oral)   Resp 18   Ht 5' 8" (1.727 m)   Wt 72.6 kg (160 lb)   SpO2 97%   BMI 24.33 kg/m²     "

## 2017-07-09 ENCOUNTER — HOSPITAL ENCOUNTER (INPATIENT)
Facility: HOSPITAL | Age: 62
LOS: 3 days | Discharge: HOSPICE | DRG: 834 | End: 2017-07-13
Attending: EMERGENCY MEDICINE | Admitting: EMERGENCY MEDICINE
Payer: MEDICARE

## 2017-07-09 DIAGNOSIS — D69.6 THROMBOCYTOPENIA: ICD-10-CM

## 2017-07-09 DIAGNOSIS — R00.0 TACHYCARDIA: ICD-10-CM

## 2017-07-09 DIAGNOSIS — R50.9 FEVER, UNSPECIFIED FEVER CAUSE: ICD-10-CM

## 2017-07-09 DIAGNOSIS — J43.9 PULMONARY EMPHYSEMA, UNSPECIFIED EMPHYSEMA TYPE: ICD-10-CM

## 2017-07-09 DIAGNOSIS — C92.02 AML (ACUTE MYELOID LEUKEMIA) IN RELAPSE: ICD-10-CM

## 2017-07-09 DIAGNOSIS — D64.9 SYMPTOMATIC ANEMIA: Primary | ICD-10-CM

## 2017-07-09 DIAGNOSIS — R79.89 ELEVATED TROPONIN: ICD-10-CM

## 2017-07-09 DIAGNOSIS — R07.9 CHEST PAIN: ICD-10-CM

## 2017-07-09 DIAGNOSIS — D84.9 IMMUNOSUPPRESSED STATUS: ICD-10-CM

## 2017-07-09 DIAGNOSIS — R06.02 SOB (SHORTNESS OF BREATH): ICD-10-CM

## 2017-07-09 LAB
ALBUMIN SERPL BCP-MCNC: 2.9 G/DL
ALP SERPL-CCNC: 61 U/L
ALT SERPL W/O P-5'-P-CCNC: 28 U/L
ANION GAP SERPL CALC-SCNC: 10 MMOL/L
APTT BLDCRRT: 32 SEC
AST SERPL-CCNC: 20 U/L
BILIRUB SERPL-MCNC: 0.4 MG/DL
BUN SERPL-MCNC: 13 MG/DL
CALCIUM SERPL-MCNC: 8.6 MG/DL
CHLORIDE SERPL-SCNC: 106 MMOL/L
CO2 SERPL-SCNC: 21 MMOL/L
CREAT SERPL-MCNC: 0.8 MG/DL
EST. GFR  (AFRICAN AMERICAN): >60 ML/MIN/1.73 M^2
EST. GFR  (NON AFRICAN AMERICAN): >60 ML/MIN/1.73 M^2
GLUCOSE SERPL-MCNC: 132 MG/DL
INR PPP: 1.1
LACTATE SERPL-SCNC: 1.4 MMOL/L
MAGNESIUM SERPL-MCNC: 1.9 MG/DL
POTASSIUM SERPL-SCNC: 3.7 MMOL/L
PROT SERPL-MCNC: 6.4 G/DL
PROTHROMBIN TIME: 11.9 SEC
SODIUM SERPL-SCNC: 137 MMOL/L
TROPONIN I SERPL DL<=0.01 NG/ML-MCNC: 0.1 NG/ML

## 2017-07-09 PROCEDURE — 80053 COMPREHEN METABOLIC PANEL: CPT

## 2017-07-09 PROCEDURE — 81003 URINALYSIS AUTO W/O SCOPE: CPT

## 2017-07-09 PROCEDURE — 86920 COMPATIBILITY TEST SPIN: CPT

## 2017-07-09 PROCEDURE — 96375 TX/PRO/DX INJ NEW DRUG ADDON: CPT

## 2017-07-09 PROCEDURE — 85027 COMPLETE CBC AUTOMATED: CPT

## 2017-07-09 PROCEDURE — 27000221 HC OXYGEN, UP TO 24 HOURS

## 2017-07-09 PROCEDURE — 85610 PROTHROMBIN TIME: CPT

## 2017-07-09 PROCEDURE — 63600175 PHARM REV CODE 636 W HCPCS: Performed by: EMERGENCY MEDICINE

## 2017-07-09 PROCEDURE — 85060 BLOOD SMEAR INTERPRETATION: CPT | Mod: ,,, | Performed by: PATHOLOGY

## 2017-07-09 PROCEDURE — 85730 THROMBOPLASTIN TIME PARTIAL: CPT

## 2017-07-09 PROCEDURE — 83605 ASSAY OF LACTIC ACID: CPT

## 2017-07-09 PROCEDURE — 86850 RBC ANTIBODY SCREEN: CPT

## 2017-07-09 PROCEDURE — 94640 AIRWAY INHALATION TREATMENT: CPT

## 2017-07-09 PROCEDURE — 84443 ASSAY THYROID STIM HORMONE: CPT

## 2017-07-09 PROCEDURE — 87040 BLOOD CULTURE FOR BACTERIA: CPT | Mod: 59

## 2017-07-09 PROCEDURE — 87086 URINE CULTURE/COLONY COUNT: CPT

## 2017-07-09 PROCEDURE — 83880 ASSAY OF NATRIURETIC PEPTIDE: CPT

## 2017-07-09 PROCEDURE — 25000242 PHARM REV CODE 250 ALT 637 W/ HCPCS: Performed by: EMERGENCY MEDICINE

## 2017-07-09 PROCEDURE — 96361 HYDRATE IV INFUSION ADD-ON: CPT

## 2017-07-09 PROCEDURE — 85007 BL SMEAR W/DIFF WBC COUNT: CPT

## 2017-07-09 PROCEDURE — 83735 ASSAY OF MAGNESIUM: CPT

## 2017-07-09 PROCEDURE — 36430 TRANSFUSION BLD/BLD COMPNT: CPT

## 2017-07-09 PROCEDURE — 96365 THER/PROPH/DIAG IV INF INIT: CPT

## 2017-07-09 PROCEDURE — 99285 EMERGENCY DEPT VISIT HI MDM: CPT | Mod: 25

## 2017-07-09 PROCEDURE — 84484 ASSAY OF TROPONIN QUANT: CPT

## 2017-07-09 PROCEDURE — 86900 BLOOD TYPING SEROLOGIC ABO: CPT

## 2017-07-09 PROCEDURE — 25000003 PHARM REV CODE 250: Performed by: EMERGENCY MEDICINE

## 2017-07-09 PROCEDURE — 93010 ELECTROCARDIOGRAM REPORT: CPT | Mod: ,,, | Performed by: INTERNAL MEDICINE

## 2017-07-09 RX ORDER — IPRATROPIUM BROMIDE AND ALBUTEROL SULFATE 2.5; .5 MG/3ML; MG/3ML
3 SOLUTION RESPIRATORY (INHALATION)
Status: COMPLETED | OUTPATIENT
Start: 2017-07-09 | End: 2017-07-09

## 2017-07-09 RX ADMIN — SODIUM CHLORIDE 1000 ML: 0.9 INJECTION, SOLUTION INTRAVENOUS at 11:07

## 2017-07-09 RX ADMIN — IPRATROPIUM BROMIDE AND ALBUTEROL SULFATE 3 ML: .5; 3 SOLUTION RESPIRATORY (INHALATION) at 11:07

## 2017-07-09 RX ADMIN — METHYLPREDNISOLONE SODIUM SUCCINATE 125 MG: 125 INJECTION, POWDER, FOR SOLUTION INTRAMUSCULAR; INTRAVENOUS at 11:07

## 2017-07-10 PROBLEM — D64.9 SYMPTOMATIC ANEMIA: Status: ACTIVE | Noted: 2017-07-10

## 2017-07-10 PROBLEM — R50.9 FEVER: Status: ACTIVE | Noted: 2017-07-10

## 2017-07-10 LAB
ABO + RH BLD: NORMAL
ANISOCYTOSIS BLD QL SMEAR: SLIGHT
ANISOCYTOSIS BLD QL SMEAR: SLIGHT
BASOPHILS # BLD AUTO: ABNORMAL K/UL
BASOPHILS NFR BLD: 0 %
BASOPHILS NFR BLD: 0 %
BILIRUB UR QL STRIP: NEGATIVE
BLASTS NFR BLD MANUAL: 26 %
BLD GP AB SCN CELLS X3 SERPL QL: NORMAL
BLD PROD TYP BPU: NORMAL
BLOOD UNIT EXPIRATION DATE: NORMAL
BLOOD UNIT TYPE CODE: 9500
BLOOD UNIT TYPE: NORMAL
BNP SERPL-MCNC: 196 PG/ML
CLARITY UR: CLEAR
CODING SYSTEM: NORMAL
COLOR UR: YELLOW
DACRYOCYTES BLD QL SMEAR: ABNORMAL
DIFFERENTIAL METHOD: ABNORMAL
DIFFERENTIAL METHOD: ABNORMAL
DISPENSE STATUS: NORMAL
EOSINOPHIL # BLD AUTO: ABNORMAL K/UL
EOSINOPHIL NFR BLD: 0 %
EOSINOPHIL NFR BLD: 1 %
ERYTHROCYTE [DISTWIDTH] IN BLOOD BY AUTOMATED COUNT: 17.1 %
ERYTHROCYTE [DISTWIDTH] IN BLOOD BY AUTOMATED COUNT: 18.4 %
GLUCOSE UR QL STRIP: NEGATIVE
HCT VFR BLD AUTO: 23.5 %
HCT VFR BLD AUTO: 24.8 %
HCT VFR BLD AUTO: 25.8 %
HGB BLD-MCNC: 7.8 G/DL
HGB BLD-MCNC: 8.3 G/DL
HGB BLD-MCNC: 8.6 G/DL
HGB UR QL STRIP: ABNORMAL
HYPOCHROMIA BLD QL SMEAR: ABNORMAL
KETONES UR QL STRIP: ABNORMAL
LEUKOCYTE ESTERASE UR QL STRIP: NEGATIVE
LYMPHOCYTES # BLD AUTO: ABNORMAL K/UL
LYMPHOCYTES NFR BLD: 44 %
LYMPHOCYTES NFR BLD: 51 %
MCH RBC QN AUTO: 35.1 PG
MCH RBC QN AUTO: 36.2 PG
MCHC RBC AUTO-ENTMCNC: 33.2 %
MCHC RBC AUTO-ENTMCNC: 33.5 %
MCV RBC AUTO: 106 FL
MCV RBC AUTO: 108 FL
METAMYELOCYTES NFR BLD MANUAL: 1 %
MONOCYTES # BLD AUTO: ABNORMAL K/UL
MONOCYTES NFR BLD: 16 %
MONOCYTES NFR BLD: 9 %
MYELOCYTES NFR BLD MANUAL: 1 %
MYELOCYTES NFR BLD MANUAL: 1 %
NEUTROPHILS NFR BLD: 1 %
NEUTROPHILS NFR BLD: 6 %
NITRITE UR QL STRIP: NEGATIVE
NUM UNITS TRANS PACKED RBC: NORMAL
OVALOCYTES BLD QL SMEAR: ABNORMAL
PATH REV BLD -IMP: NORMAL
PH UR STRIP: 7 [PH] (ref 5–8)
PLATELET # BLD AUTO: 28 K/UL
PLATELET # BLD AUTO: 30 K/UL
PLATELET BLD QL SMEAR: ABNORMAL
PLATELET BLD QL SMEAR: ABNORMAL
PMV BLD AUTO: 10.7 FL
PMV BLD AUTO: 9.4 FL
POIKILOCYTOSIS BLD QL SMEAR: SLIGHT
POLYCHROMASIA BLD QL SMEAR: ABNORMAL
PROT UR QL STRIP: NEGATIVE
RBC # BLD AUTO: 2.22 M/UL
RBC # BLD AUTO: 2.29 M/UL
SCHISTOCYTES BLD QL SMEAR: ABNORMAL
SCHISTOCYTES BLD QL SMEAR: PRESENT
SMUDGE CELLS BLD QL SMEAR: PRESENT
SP GR UR STRIP: 1.01 (ref 1–1.03)
SPHEROCYTES BLD QL SMEAR: ABNORMAL
TROPONIN I SERPL DL<=0.01 NG/ML-MCNC: 0.1 NG/ML
TSH SERPL DL<=0.005 MIU/L-ACNC: 3.9 UIU/ML
URN SPEC COLLECT METH UR: ABNORMAL
UROBILINOGEN UR STRIP-ACNC: ABNORMAL EU/DL
WBC # BLD AUTO: 11.4 K/UL
WBC # BLD AUTO: 3.23 K/UL
WBC OTHER NFR BLD MANUAL: 43 %
WBC OTHER NFR BLD MANUAL: ABNORMAL %

## 2017-07-10 PROCEDURE — 21400001 HC TELEMETRY ROOM

## 2017-07-10 PROCEDURE — 93010 ELECTROCARDIOGRAM REPORT: CPT | Mod: ,,, | Performed by: INTERNAL MEDICINE

## 2017-07-10 PROCEDURE — 25000003 PHARM REV CODE 250: Performed by: NURSE PRACTITIONER

## 2017-07-10 PROCEDURE — 63600175 PHARM REV CODE 636 W HCPCS: Performed by: EMERGENCY MEDICINE

## 2017-07-10 PROCEDURE — 25000003 PHARM REV CODE 250: Performed by: EMERGENCY MEDICINE

## 2017-07-10 PROCEDURE — 84484 ASSAY OF TROPONIN QUANT: CPT | Mod: 91

## 2017-07-10 PROCEDURE — 85018 HEMOGLOBIN: CPT

## 2017-07-10 PROCEDURE — 93005 ELECTROCARDIOGRAM TRACING: CPT

## 2017-07-10 PROCEDURE — 99223 1ST HOSP IP/OBS HIGH 75: CPT | Mod: ,,, | Performed by: INTERNAL MEDICINE

## 2017-07-10 PROCEDURE — 36415 COLL VENOUS BLD VENIPUNCTURE: CPT

## 2017-07-10 PROCEDURE — 85007 BL SMEAR W/DIFF WBC COUNT: CPT

## 2017-07-10 PROCEDURE — P9040 RBC LEUKOREDUCED IRRADIATED: HCPCS

## 2017-07-10 PROCEDURE — 27000221 HC OXYGEN, UP TO 24 HOURS

## 2017-07-10 PROCEDURE — 25500020 PHARM REV CODE 255: Performed by: EMERGENCY MEDICINE

## 2017-07-10 PROCEDURE — 85027 COMPLETE CBC AUTOMATED: CPT

## 2017-07-10 PROCEDURE — 85014 HEMATOCRIT: CPT

## 2017-07-10 PROCEDURE — 84484 ASSAY OF TROPONIN QUANT: CPT

## 2017-07-10 RX ORDER — NITROGLYCERIN 0.4 MG/1
0.4 TABLET SUBLINGUAL EVERY 5 MIN PRN
Status: DISCONTINUED | OUTPATIENT
Start: 2017-07-10 | End: 2017-07-13 | Stop reason: HOSPADM

## 2017-07-10 RX ORDER — SODIUM CHLORIDE 9 MG/ML
INJECTION, SOLUTION INTRAVENOUS CONTINUOUS
Status: DISCONTINUED | OUTPATIENT
Start: 2017-07-10 | End: 2017-07-11

## 2017-07-10 RX ORDER — CALCIUM CARBONATE 200(500)MG
500 TABLET,CHEWABLE ORAL 3 TIMES DAILY PRN
Status: DISCONTINUED | OUTPATIENT
Start: 2017-07-10 | End: 2017-07-13 | Stop reason: HOSPADM

## 2017-07-10 RX ORDER — ONDANSETRON 2 MG/ML
4 INJECTION INTRAMUSCULAR; INTRAVENOUS
Status: COMPLETED | OUTPATIENT
Start: 2017-07-10 | End: 2017-07-10

## 2017-07-10 RX ORDER — CEFEPIME HYDROCHLORIDE 2 G/50ML
2 INJECTION, SOLUTION INTRAVENOUS
Status: DISCONTINUED | OUTPATIENT
Start: 2017-07-10 | End: 2017-07-13 | Stop reason: HOSPADM

## 2017-07-10 RX ORDER — SODIUM CHLORIDE 9 MG/ML
1000 INJECTION, SOLUTION INTRAVENOUS
Status: ACTIVE | OUTPATIENT
Start: 2017-07-10 | End: 2017-07-10

## 2017-07-10 RX ORDER — HYDROCODONE BITARTRATE AND ACETAMINOPHEN 500; 5 MG/1; MG/1
TABLET ORAL
Status: DISCONTINUED | OUTPATIENT
Start: 2017-07-10 | End: 2017-07-13 | Stop reason: HOSPADM

## 2017-07-10 RX ORDER — RAMELTEON 8 MG/1
8 TABLET ORAL NIGHTLY PRN
Status: DISCONTINUED | OUTPATIENT
Start: 2017-07-10 | End: 2017-07-13 | Stop reason: HOSPADM

## 2017-07-10 RX ORDER — MORPHINE SULFATE 4 MG/ML
4 INJECTION, SOLUTION INTRAMUSCULAR; INTRAVENOUS
Status: COMPLETED | OUTPATIENT
Start: 2017-07-10 | End: 2017-07-10

## 2017-07-10 RX ADMIN — SODIUM CHLORIDE: 0.9 INJECTION, SOLUTION INTRAVENOUS at 11:07

## 2017-07-10 RX ADMIN — CEFEPIME HYDROCHLORIDE 2 G: 2 INJECTION, SOLUTION INTRAVENOUS at 12:07

## 2017-07-10 RX ADMIN — CEFEPIME HYDROCHLORIDE 2 G: 2 INJECTION, SOLUTION INTRAVENOUS at 08:07

## 2017-07-10 RX ADMIN — SODIUM CHLORIDE: 0.9 INJECTION, SOLUTION INTRAVENOUS at 07:07

## 2017-07-10 RX ADMIN — CEFEPIME HYDROCHLORIDE 2 G: 2 INJECTION, SOLUTION INTRAVENOUS at 04:07

## 2017-07-10 RX ADMIN — IOHEXOL 100 ML: 350 INJECTION, SOLUTION INTRAVENOUS at 12:07

## 2017-07-10 RX ADMIN — ONDANSETRON 4 MG: 2 INJECTION INTRAMUSCULAR; INTRAVENOUS at 01:07

## 2017-07-10 RX ADMIN — CEFEPIME HYDROCHLORIDE 2 G: 2 INJECTION, SOLUTION INTRAVENOUS at 11:07

## 2017-07-10 RX ADMIN — MORPHINE SULFATE 4 MG: 4 INJECTION, SOLUTION INTRAMUSCULAR; INTRAVENOUS at 01:07

## 2017-07-10 RX ADMIN — CALCIUM CARBONATE (ANTACID) CHEW TAB 500 MG 500 MG: 500 CHEW TAB at 04:07

## 2017-07-10 NOTE — ED NOTES
Pt c/o chest pain at this time. Dr. Woodard made aware. Further orders given. Will cont to monitor.

## 2017-07-10 NOTE — ASSESSMENT & PLAN NOTE
Sec to Chemo-- no CHF or Pneumonia  Getting a unit of blood  May need Venofer  Watch closely, may defer chemo for a few days

## 2017-07-10 NOTE — PROGRESS NOTES
"Pt arrived to the floor.Transferred to bed .Patient is aaox4. Pt SOB. 3L 02 in place relaxation and deep breathing encouraged. O2 stats stable. Report received from ER /60 (BP Location: Right arm, Patient Position: Lying, BP Method: Automatic)   Pulse 94   Temp 97.8 °F (36.6 °C) (Oral)   Resp (!) 22   Ht 5' 8" (1.727 m)   Wt 72.6 kg (160 lb 0.9 oz)   SpO2 99%   BMI 24.34 kg/m²  Patient has been orientated to room, call light, fall precautions, rounding sheet, menu, and board. Heart monitor in place, no questions or concerns at this time.   "

## 2017-07-10 NOTE — HPI
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  Of told that I would admit to the hospital with blood cultures to make sure patient is not septic before consideration of further chemotherapy.

## 2017-07-10 NOTE — SUBJECTIVE & OBJECTIVE
Past Medical History:   Diagnosis Date    AML (acute myeloblastic leukemia) 11/04/2016    non -M3     Encounter for blood transfusion     Hypertension     Stroke     R side weakness       Past Surgical History:   Procedure Laterality Date    partial amputation middle finger Right        Review of patient's allergies indicates:  No Known Allergies    No current facility-administered medications on file prior to encounter.      Current Outpatient Prescriptions on File Prior to Encounter   Medication Sig    aspirin 81 MG Chew Take 81 mg by mouth once daily.    hydrocodone-acetaminophen 5-325mg (NORCO) 5-325 mg per tablet Take 1 tablet by mouth every 6 (six) hours as needed for Pain.    ondansetron (ZOFRAN) 8 MG tablet Take 1 tablet (8 mg total) by mouth every 12 (twelve) hours as needed for Nausea.    valacyclovir (VALTREX) 500 MG tablet Take 1 tablet (500 mg total) by mouth once daily. (Patient taking differently: Take 500 mg by mouth daily as needed. )     Family History     Problem Relation (Age of Onset)    Diabetes Father        Social History Main Topics    Smoking status: Current Every Day Smoker     Packs/day: 0.50     Years: 42.00     Types: Cigarettes     Last attempt to quit: 11/3/2016    Smokeless tobacco: Never Used      Comment: no smoking after midnight prior to surgery    Alcohol use No    Drug use: No    Sexual activity: Not on file     Review of Systems   Constitutional: Positive for activity change, appetite change, fatigue and fever.   HENT: Negative.    Eyes: Negative.    Respiratory: Positive for shortness of breath. Negative for cough, choking and wheezing.    Cardiovascular: Negative.    Gastrointestinal: Negative.    Genitourinary: Negative.    Musculoskeletal: Positive for arthralgias and gait problem.   Skin: Negative.    Neurological: Positive for weakness.   Psychiatric/Behavioral: Positive for decreased concentration and dysphoric mood.     Objective:     Vital Signs (Most  Recent):  Temp: 97.8 °F (36.6 °C) (07/10/17 0237)  Pulse: 82 (07/10/17 0300)  Resp: (!) 22 (07/10/17 0237)  BP: 112/60 (07/10/17 0237)  SpO2: 99 % (07/10/17 0237) Vital Signs (24h Range):  Temp:  [97.8 °F (36.6 °C)-100.1 °F (37.8 °C)] 97.8 °F (36.6 °C)  Pulse:  [] 82  Resp:  [17-24] 22  SpO2:  [97 %-100 %] 99 %  BP: (103-134)/(54-68) 112/60     Weight: 72.6 kg (160 lb 0.9 oz)  Body mass index is 24.34 kg/m².    Physical Exam   Constitutional: He is oriented to person, place, and time. He appears well-developed and well-nourished. No distress.   Sickly appearing man in NAD   HENT:   Head: Normocephalic.   Mouth/Throat: Oropharynx is clear and moist.   Eyes: Conjunctivae and EOM are normal. Pupils are equal, round, and reactive to light. Right eye exhibits no discharge. Left eye exhibits no discharge. No scleral icterus.   Neck: Normal range of motion. Neck supple. No JVD present.   Cardiovascular: Normal rate, regular rhythm and intact distal pulses.  Exam reveals no gallop and no friction rub.    Murmur heard.  Pulmonary/Chest: Effort normal and breath sounds normal. No stridor. No respiratory distress. He has no wheezes. He has no rales.   Abdominal: Soft. Bowel sounds are normal. He exhibits no distension. There is no tenderness. There is no guarding.   Genitourinary:   Genitourinary Comments: deferred   Musculoskeletal: Normal range of motion. He exhibits tenderness and deformity. He exhibits no edema.   Lymphadenopathy:     He has no cervical adenopathy.   Neurological: He is alert and oriented to person, place, and time. He has normal reflexes.   Skin: Skin is warm and dry. Capillary refill takes 2 to 3 seconds. Rash noted. He is not diaphoretic. No erythema.   Nursing note and vitals reviewed.       Results for orders placed or performed during the hospital encounter of 07/09/17   APTT   Result Value Ref Range    aPTT 32.0 21.0 - 32.0 sec   Brain natriuretic peptide   Result Value Ref Range      (H) 0 - 99 pg/mL   CBC auto differential   Result Value Ref Range    WBC 11.40 3.90 - 12.70 K/uL    RBC 2.29 (L) 4.60 - 6.20 M/uL    Hemoglobin 8.3 (L) 14.0 - 18.0 g/dL    Hematocrit 24.8 (L) 40.0 - 54.0 %     (H) 82 - 98 fL    MCH 36.2 (H) 27.0 - 31.0 pg    MCHC 33.5 32.0 - 36.0 %    RDW 17.1 (H) 11.5 - 14.5 %    Platelets 30 (LL) 150 - 350 K/uL    MPV 9.4 9.2 - 12.9 fL    Lymph # CANCELED 1.0 - 4.8 K/uL    Mono # CANCELED 0.3 - 1.0 K/uL    Eos # CANCELED 0.0 - 0.5 K/uL    Baso # CANCELED 0.00 - 0.20 K/uL    Gran% 1.0 (L) 38.0 - 73.0 %    Lymph% 44.0 18.0 - 48.0 %    Mono% 9.0 4.0 - 15.0 %    Eosinophil% 1.0 0.0 - 8.0 %    Basophil% 0.0 0.0 - 1.9 %    Metamyelocytes 1.0 %    Myelocytes 1.0 %    Other 43 %    Platelet Estimate Decreased (A)     Aniso Slight     Poik Slight     Poly Occasional     Hypo Occasional     Ovalocytes Occasional     Tear Drop Cells Occasional     Spherocytes Occasional     Schistocytes Present     Smudge Cells Present     Fragmented Cells Occasional     Differential Method Manual    Comprehensive metabolic panel   Result Value Ref Range    Sodium 137 136 - 145 mmol/L    Potassium 3.7 3.5 - 5.1 mmol/L    Chloride 106 95 - 110 mmol/L    CO2 21 (L) 23 - 29 mmol/L    Glucose 132 (H) 70 - 110 mg/dL    BUN, Bld 13 8 - 23 mg/dL    Creatinine 0.8 0.5 - 1.4 mg/dL    Calcium 8.6 (L) 8.7 - 10.5 mg/dL    Total Protein 6.4 6.0 - 8.4 g/dL    Albumin 2.9 (L) 3.5 - 5.2 g/dL    Total Bilirubin 0.4 0.1 - 1.0 mg/dL    Alkaline Phosphatase 61 55 - 135 U/L    AST 20 10 - 40 U/L    ALT 28 10 - 44 U/L    Anion Gap 10 8 - 16 mmol/L    eGFR if African American >60 >60 mL/min/1.73 m^2    eGFR if non African American >60 >60 mL/min/1.73 m^2   Lactic acid, plasma #1   Result Value Ref Range    Lactate (Lactic Acid) 1.4 0.5 - 2.2 mmol/L   Magnesium   Result Value Ref Range    Magnesium 1.9 1.6 - 2.6 mg/dL   Protime-INR   Result Value Ref Range    Prothrombin Time 11.9 9.0 - 12.5 sec    INR 1.1 0.8 - 1.2    Troponin I   Result Value Ref Range    Troponin I 0.102 (H) 0.000 - 0.026 ng/mL   TSH   Result Value Ref Range    TSH 3.897 0.400 - 4.000 uIU/mL   Urinalysis   Result Value Ref Range    Specimen UA Urine, Clean Catch     Color, UA Yellow Yellow, Straw, Tejal    Appearance, UA Clear Clear    pH, UA 7.0 5.0 - 8.0    Specific Gravity, UA 1.010 1.005 - 1.030    Protein, UA Negative Negative    Glucose, UA Negative Negative    Ketones, UA Trace (A) Negative    Bilirubin (UA) Negative Negative    Occult Blood UA Trace (A) Negative    Nitrite, UA Negative Negative    Urobilinogen, UA 4.0-6.0 (A) <2.0 EU/dL    Leukocytes, UA Negative Negative   Type & Screen   Result Value Ref Range    Group & Rh O NEG     Indirect Ute NEG    Prepare RBC 1 Unit   Result Value Ref Range    UNIT NUMBER [id number]     PRODUCT CODE [id number]     DISPENSE STATUS CROSSMATCHED     CODING SYSTEM OERC023     Unit Blood Type Code 9500     Unit Blood Type O NEG     Unit Expiration [id number]      Significant Labs: All pertinent labs within the past 24 hours have been reviewed.  Imaging Results          X-Ray Hand 3 view Right (In process)                CTA Chest Non-Coronary (PE Study) (In process)                X-Ray Chest AP Portable (Final result)  Result time 07/09/17 23:28:24    Final result by Carlos Beltran MD (07/09/17 23:28:24)                 Impression:         No acute process. No significant change from prior study.      Electronically signed by: CARLOS BELTRAN MD  Date:     07/09/17  Time:    23:28              Narrative:    Exam: Chest X-ray, one view.    History: Sepsis    Findings: Comparison is made to prior study dated 06/30/2017. Lungs remain clear. Heart size normal. Right chest port remains in place.                            Significant Imaging: I have reviewed and interpreted all pertinent imaging results/findings within the past 24 hours.

## 2017-07-10 NOTE — CONSULTS
[facility]  Hematology/Oncology  Consult Note    Patient Name: Carlos Jordan Jr.  MRN: [id number]  Admission Date: 7/9/2017  Hospital Length of Stay: 0 days  Code Status: Full Code   Attending Provider: Joce Vivas MD  Consulting Provider: Brown Hudson MD  Primary Care Physician: Primary Doctor No  Principal Problem:Symptomatic anemia    Consults  Subjective:     HPI:  62-year-old male who was scheduled to start today third line treatment for relapsed refractory acute myelogenous leukemia with Dacogen presented to the emergency room with fever and weakness was asked by ER physician whether or not to admit and reported non-neutropenic state.  Of told that I would admit to the hospital with blood cultures to make sure patient is not septic before consideration of further chemotherapy.    No new subjective & objective note has been filed under this hospital service since the last note was generated.    Assessment/Plan:     Acute myeloid leukemia not having achieved remission    The patient presents with a possible septic syndrome.  Prior to initiation of any further chemotherapy will make sure that the patient is medically stable before initiation of treatment will discuss with primary oncologist 1 situation is clarified            Thank you for your consult. I will follow-up with patient. Please contact us if you have any additional questions.    Brown Hudson MD  Hematology/Oncology  [facility]

## 2017-07-10 NOTE — PLAN OF CARE
Problem: Patient Care Overview  Goal: Plan of Care Review  Outcome: Ongoing (interventions implemented as appropriate)  POC discussed w/patient and daughter, verbalized understanding. NSR on monitor. VSS. Voids per urinal. SOB reported 3L O2 in place. Deep breaths and relaxation techniques encouraged. No c/o of pain. 1 unit of blood transfusing. Pt tolerating well.  Patient turns independently in bed. Fall precautions in place, bed alarm on.

## 2017-07-10 NOTE — ED PROVIDER NOTES
SCRIBE #1 NOTE: I, Latrice Burris, am scribing for, and in the presence of, Elvia Woodard MD. I have scribed the entire note.      History      Chief Complaint   Patient presents with    Shortness of Breath    Fever       Review of patient's allergies indicates:  No Known Allergies     HPI   HPI    7/9/2017, 11:00 PM   History obtained from the patient      History of Present Illness: Carlos Jordan Jr. is a 62 y.o. male patient, with a hx of leukemia, presenting to the Emergency Department for SOB which onset gradually today prior to arrival. Pt reports that he is scheduled to receive chemo tomorrow and that last dosage received was over a month ago. Symptoms are constant and moderate in severity.  No mitigating or exacerbating factors reported. Associated sxs include fever (tmax 100.3) and dry cough. Patient denies any recent travel, long car trips, leg pain/swelling, CP, n/v, and all other sxs at this time. Pt denies taking any breathing tx at home. No further complaints or concerns at this time.     Arrival mode: Personal vehicle    PCP: Primary Doctor No       Past Medical History:  Past Medical History:   Diagnosis Date    AML (acute myeloblastic leukemia) 11/04/2016    non -M3     Encounter for blood transfusion     Hypertension     Stroke     R side weakness       Past Surgical History:  Past Surgical History:   Procedure Laterality Date    partial amputation middle finger Right          Family History:  Family History   Problem Relation Age of Onset    Diabetes Father        Social History:  Social History     Social History Main Topics    Smoking status: Current Every Day Smoker     Packs/day: 0.50     Years: 42.00     Types: Cigarettes     Last attempt to quit: 11/3/2016    Smokeless tobacco: Never Used      Comment: no smoking after midnight prior to surgery    Alcohol use No    Drug use: No    Sexual activity: Unknown       ROS   Review of Systems   Constitutional: Positive for fever.         - recent travel  - long car trips    HENT: Negative for sore throat.    Respiratory: Positive for cough and shortness of breath.    Cardiovascular: Negative for chest pain, palpitations and leg swelling.   Gastrointestinal: Negative for nausea and vomiting.   Genitourinary: Negative for dysuria.   Musculoskeletal: Negative for back pain.        - leg pain    Skin: Negative for rash.   Neurological: Negative for weakness.   Hematological: Does not bruise/bleed easily.       Physical Exam      Initial Vitals   BP Pulse Resp Temp SpO2   07/09/17 2300 07/09/17 2255 07/09/17 2255 07/09/17 2300 07/09/17 2255   134/68 109 (!) 23 98.3 °F (36.8 °C) 98 %      MAP       07/09/17 2300       90           Physical Exam  Nursing Notes and Vital Signs Reviewed.  Constitutional: Patient is in mild distress. Chronically ill. Disheveled.   Head: Atraumatic. Normocephalic.  Eyes: PERRL. EOM intact. Conjunctivae are not pale. No scleral icterus.  ENT: Mucous membranes are dry. Oropharynx is clear and symmetric.    Neck: Supple. Full ROM. No lymphadenopathy.  Cardiovascular: Tachycardia. Regular rhythm. No murmurs, rubs, or gallops. Distal pulses are 2+ and symmetric.  Pulmonary/Chest: Milld respiratory distress. Tachypnea. Diminished breath sounds bilat. No wheezing, rales, or rhonchi.  Abdominal: Soft and non-distended. There is no tenderness.  No rebound, guarding, or rigidity. Good bowel sounds.  Genitourinary: No CVA tenderness  Musculoskeletal: Moves all extremities. No obvious deformities. No edema. No calf tenderness.  Skin: Warm and dry.  Neurological:  Alert, awake, and appropriate.  Normal speech.  No acute focal neurological deficits are appreciated.  Psychiatric: Normal affect. Good eye contact. Appropriate in content.    ED Course    Procedures  ED Vital Signs:  Vitals:    07/09/17 2255 07/09/17 2300 07/09/17 2301 07/09/17 2307   BP:  134/68     Pulse: 109 107  (!) 111   Resp: (!) 23 (!) 22  (!) 24   Temp:  98.3 °F (36.8  "°C) 98.3 °F (36.8 °C)    SpO2: 98% 100%  100%   Weight:   72.6 kg (160 lb 0.9 oz)    Height:   5' 8" (1.727 m)     07/09/17 2312 07/09/17 2317 07/09/17 2340 07/10/17 0010   BP:   113/65 (!) 118/54   Pulse: (!) 112 (!) 114 (!) 113 (!) 115   Resp: (!) 24 18 20 (!) 23   Temp:   100.1 °F (37.8 °C)    SpO2: 100% 100% 100% 100%   Weight:       Height:        07/10/17 0110   BP: (!) 105/57   Pulse: 102   Resp: (!) 21   Temp: 98.8 °F (37.1 °C)   SpO2: 99%   Weight:    Height:        Abnormal Lab Results:  Labs Reviewed   B-TYPE NATRIURETIC PEPTIDE - Abnormal; Notable for the following:        Result Value     (*)     All other components within normal limits   CBC W/ AUTO DIFFERENTIAL - Abnormal; Notable for the following:     RBC 2.29 (*)     Hemoglobin 8.3 (*)     Hematocrit 24.8 (*)      (*)     MCH 36.2 (*)     RDW 17.1 (*)     Platelets 30 (*)     Gran% 1.0 (*)     Platelet Estimate Decreased (*)     All other components within normal limits    Narrative:     PLT  critical result(s) called and verbal readback obtained from   Paty Cullen RN, 07/09/2017 23:41   COMPREHENSIVE METABOLIC PANEL - Abnormal; Notable for the following:     CO2 21 (*)     Glucose 132 (*)     Calcium 8.6 (*)     Albumin 2.9 (*)     All other components within normal limits   TROPONIN I - Abnormal; Notable for the following:     Troponin I 0.102 (*)     All other components within normal limits   URINALYSIS - Abnormal; Notable for the following:     Ketones, UA Trace (*)     Occult Blood UA Trace (*)     Urobilinogen, UA 4.0-6.0 (*)     All other components within normal limits   CULTURE, BLOOD   CULTURE, BLOOD   CULTURE, URINE   APTT   LACTIC ACID, PLASMA   MAGNESIUM   PROTIME-INR   TSH   TYPE & SCREEN   PREPARE RBC SOFT        All Lab Results:  Results for orders placed or performed during the hospital encounter of 07/09/17   APTT   Result Value Ref Range    aPTT 32.0 21.0 - 32.0 sec   Brain natriuretic peptide   Result Value " Ref Range     (H) 0 - 99 pg/mL   CBC auto differential   Result Value Ref Range    WBC 11.40 3.90 - 12.70 K/uL    RBC 2.29 (L) 4.60 - 6.20 M/uL    Hemoglobin 8.3 (L) 14.0 - 18.0 g/dL    Hematocrit 24.8 (L) 40.0 - 54.0 %     (H) 82 - 98 fL    MCH 36.2 (H) 27.0 - 31.0 pg    MCHC 33.5 32.0 - 36.0 %    RDW 17.1 (H) 11.5 - 14.5 %    Platelets 30 (LL) 150 - 350 K/uL    MPV 9.4 9.2 - 12.9 fL    Lymph # CANCELED 1.0 - 4.8 K/uL    Mono # CANCELED 0.3 - 1.0 K/uL    Eos # CANCELED 0.0 - 0.5 K/uL    Baso # CANCELED 0.00 - 0.20 K/uL    Gran% 1.0 (L) 38.0 - 73.0 %    Lymph% 44.0 18.0 - 48.0 %    Mono% 9.0 4.0 - 15.0 %    Eosinophil% 1.0 0.0 - 8.0 %    Basophil% 0.0 0.0 - 1.9 %    Metamyelocytes 1.0 %    Myelocytes 1.0 %    Other 43 %    Platelet Estimate Decreased (A)     Aniso Slight     Poik Slight     Poly Occasional     Hypo Occasional     Ovalocytes Occasional     Tear Drop Cells Occasional     Spherocytes Occasional     Schistocytes Present     Smudge Cells Present     Fragmented Cells Occasional     Differential Method Manual    Comprehensive metabolic panel   Result Value Ref Range    Sodium 137 136 - 145 mmol/L    Potassium 3.7 3.5 - 5.1 mmol/L    Chloride 106 95 - 110 mmol/L    CO2 21 (L) 23 - 29 mmol/L    Glucose 132 (H) 70 - 110 mg/dL    BUN, Bld 13 8 - 23 mg/dL    Creatinine 0.8 0.5 - 1.4 mg/dL    Calcium 8.6 (L) 8.7 - 10.5 mg/dL    Total Protein 6.4 6.0 - 8.4 g/dL    Albumin 2.9 (L) 3.5 - 5.2 g/dL    Total Bilirubin 0.4 0.1 - 1.0 mg/dL    Alkaline Phosphatase 61 55 - 135 U/L    AST 20 10 - 40 U/L    ALT 28 10 - 44 U/L    Anion Gap 10 8 - 16 mmol/L    eGFR if African American >60 >60 mL/min/1.73 m^2    eGFR if non African American >60 >60 mL/min/1.73 m^2   Lactic acid, plasma #1   Result Value Ref Range    Lactate (Lactic Acid) 1.4 0.5 - 2.2 mmol/L   Magnesium   Result Value Ref Range    Magnesium 1.9 1.6 - 2.6 mg/dL   Protime-INR   Result Value Ref Range    Prothrombin Time 11.9 9.0 - 12.5 sec    INR  1.1 0.8 - 1.2   Troponin I   Result Value Ref Range    Troponin I 0.102 (H) 0.000 - 0.026 ng/mL   TSH   Result Value Ref Range    TSH 3.897 0.400 - 4.000 uIU/mL   Urinalysis   Result Value Ref Range    Specimen UA Urine, Clean Catch     Color, UA Yellow Yellow, Straw, Tejal    Appearance, UA Clear Clear    pH, UA 7.0 5.0 - 8.0    Specific Gravity, UA 1.010 1.005 - 1.030    Protein, UA Negative Negative    Glucose, UA Negative Negative    Ketones, UA Trace (A) Negative    Bilirubin (UA) Negative Negative    Occult Blood UA Trace (A) Negative    Nitrite, UA Negative Negative    Urobilinogen, UA 4.0-6.0 (A) <2.0 EU/dL    Leukocytes, UA Negative Negative   Type & Screen   Result Value Ref Range    Group & Rh O NEG     Indirect Ute NEG          Imaging Results:  Imaging Results          X-Ray Hand 3 view Right (In process)                CTA Chest Non-Coronary (PE Study) (In process)                X-Ray Chest AP Portable (Final result)  Result time 07/09/17 23:28:24    Final result by Carlos Beltran MD (07/09/17 23:28:24)                 Impression:         No acute process. No significant change from prior study.      Electronically signed by: CARLOS BELTRAN MD  Date:     07/09/17  Time:    23:28              Narrative:    Exam: Chest X-ray, one view.    History: Sepsis    Findings: Comparison is made to prior study dated 06/30/2017. Lungs remain clear. Heart size normal. Right chest port remains in place.                          CTA Chest Non-Coronary (PE Study)   Impression: Motion artifact but no obvious PE.        The EKG was ordered, reviewed, and independently interpreted by the ED provider.  Interpretation time: 2252  Rate: 107 BPM  Rhythm: sinus tachycardia with premature supraventricular complexes and with occasional PVC.  Interpretation: ST and T wave abnormality, consider inferolateral ischemia. No STEMI.  When compared to EKG performed 6/26/17 there are no significant changes.             The  Emergency Provider reviewed the vital signs and test results, which are outlined above.    ED Discussion   The patient was counseled on the dangers of tobacco use.  Reviewed strategies to maximize success.  Assistance with smoking cessation was offered, including:  []  Medications  [x]  Counseling  []  Printed Information on Smoking Cessation  []  Referral to a Smoking Cessation Program    Patient was counseled regarding smoking for 3-10 minutes.    12:12 AM: Dr. Elvia Woodard MD  discussed the pt's case with Dr. Hudson (Hem/Onc) who recommends/agrees with plan for admission and giving pt 1 unit of blood and empiric ABx (Cefepime).    Patient c/o right hand pain and swelling after changing a tire on Friday, denies any decreased range of motion.  There is mild swelling and tenderness, along the dorsum aspect of the right 4th and 5th metacarpal, no deformity, erythema or warmth, FROM without difficulty.     12:15 AM: Re-evaluated pt. I have discussed test results, shared treatment plan, and the need for admission with patient. Pt express understanding at this time and agree with all information. All questions answered. Pt has no further questions or concerns at this time. Pt is ready for admit.    1:10 AM: Discussed case with Dr. Ed MD (Hospital Medicine). Dr. Ed MD ([facility]) agrees with current care and management of pt and accepts admission.   Admitting Service: Hospital medicine   Admitting Physician: Dr. Ed MD  Admit to: [facility]    ED Medication(s):  Medications   ceFEPIme in dextrose 5% 2 gram/50 mL IVPB 2 g (2 g Intravenous New Bag 7/10/17 0049)   0.9%  NaCl infusion (for blood administration) (not administered)   0.9%  NaCl infusion (not administered)   sodium chloride 0.9% bolus 1,000 mL (1,000 mLs Intravenous New Bag 7/9/17 8746)   albuterol-ipratropium 2.5mg-0.5mg/3mL nebulizer solution 3 mL (3 mLs Nebulization Given 7/9/17 7951)   methylPREDNISolone sod suc(PF) injection 125 mg  (125 mg Intravenous Given 7/9/17 2317)   omnipaque 350 iohexol 100 mL (100 mLs Intravenous Given 7/10/17 0031)   morphine injection 4 mg (4 mg Intravenous Given 7/10/17 0100)   ondansetron injection 4 mg (4 mg Intravenous Given 7/10/17 0100)       New Prescriptions    No medications on file             Medical Decision Making    Medical Decision Making:   Clinical Tests:   Lab Tests: Ordered and Reviewed  Radiological Study: Ordered and Reviewed  Medical Tests: Ordered and Reviewed           Scribe Attestation:   Scribe #1: I performed the above scribed service and the documentation accurately describes the services I performed. I attest to the accuracy of the note.    Attending:   Physician Attestation Statement for Scribe #1: I, Elvia Woodard MD, personally performed the services described in this documentation, as scribed by Latrice Burris, in my presence, and it is both accurate and complete.          Clinical Impression       ICD-10-CM ICD-9-CM   1. Symptomatic anemia D64.9 285.9   2. SOB (shortness of breath) R06.02 786.05   3. AML (acute myeloid leukemia) in relapse C92.02 205.02   4. Fever, unspecified fever cause R50.9 780.60   5. Immunosuppressed status D89.9 279.9   6. Thrombocytopenia D69.6 287.5   7. Tachycardia R00.0 785.0   8. Pulmonary emphysema, unspecified emphysema type J43.9 492.8   9. Elevated troponin R74.8 790.6       Disposition:   Disposition: Admitted  Condition: Fair         Elvia Woodard MD  07/10/17 0117

## 2017-07-10 NOTE — PROGRESS NOTES
Spoke with Lab at this time to confirm redraw is required for post transfusion. She states she will recollect serum draw shortly

## 2017-07-10 NOTE — H&P
[facility] Hospital Medicine  History & Physical    Patient Name: Carlos Jordan Jr.  MRN: [id number]  Admission Date: 7/9/2017  Attending Physician: Haydee Ward MD   Primary Care Provider: Primary Doctor No         Patient information was obtained from patient and ER records.     Subjective:     Principal Problem:Symptomatic anemia    Chief Complaint:   Chief Complaint   Patient presents with    Shortness of Breath    Fever        HPI:   Chief Complaint   Patient presents with    Shortness of Breath    Fever         Review of patient's allergies indicates:  No Known Allergies      History obtained from the patient                            History of Present Illness: Carlos Jordan Jr. is a 62 y.o. male patient, with hx of COPD, AML on chemo, present s to the ER for SOB which started gradually today PTA. Pt reports that he is scheduled to receive chemo tomorrow and that last dosage received was over a month ago.  Associated sxs include fever (tmax 100.3) and dry cough. Patient denies any recent travel, long car trips, leg pain/swelling, CP, n/v, and all other sxs at this time. Pt denies taking any breathing tx at home. No further complaints or concerns at this time.     In the ER, pts labs showed a drop in the H/H to 8.3/25 with Platelets 30K. Oncologist Dr. Hudson recommended admit with IVF and giving him a unit of blood and empirically start Cefipime. Pt received a L if NS and is feeling a little better.     Past Medical History:   Diagnosis Date    AML (acute myeloblastic leukemia) 11/04/2016    non -M3     Encounter for blood transfusion     Hypertension     Stroke     R side weakness       Past Surgical History:   Procedure Laterality Date    partial amputation middle finger Right        Review of patient's allergies indicates:  No Known Allergies    No current facility-administered medications on file prior to encounter.      Current Outpatient Prescriptions on File Prior to Encounter    Medication Sig    aspirin 81 MG Chew Take 81 mg by mouth once daily.    hydrocodone-acetaminophen 5-325mg (NORCO) 5-325 mg per tablet Take 1 tablet by mouth every 6 (six) hours as needed for Pain.    ondansetron (ZOFRAN) 8 MG tablet Take 1 tablet (8 mg total) by mouth every 12 (twelve) hours as needed for Nausea.    valacyclovir (VALTREX) 500 MG tablet Take 1 tablet (500 mg total) by mouth once daily. (Patient taking differently: Take 500 mg by mouth daily as needed. )     Family History     Problem Relation (Age of Onset)    Diabetes Father        Social History Main Topics    Smoking status: Current Every Day Smoker     Packs/day: 0.50     Years: 42.00     Types: Cigarettes     Last attempt to quit: 11/3/2016    Smokeless tobacco: Never Used      Comment: no smoking after midnight prior to surgery    Alcohol use No    Drug use: No    Sexual activity: Not on file     Review of Systems   Constitutional: Positive for activity change, appetite change, fatigue and fever.   HENT: Negative.    Eyes: Negative.    Respiratory: Positive for shortness of breath. Negative for cough, choking and wheezing.    Cardiovascular: Negative.    Gastrointestinal: Negative.    Genitourinary: Negative.    Musculoskeletal: Positive for arthralgias and gait problem.   Skin: Negative.    Neurological: Positive for weakness.   Psychiatric/Behavioral: Positive for decreased concentration and dysphoric mood.     Objective:     Vital Signs (Most Recent):  Temp: 97.8 °F (36.6 °C) (07/10/17 0237)  Pulse: 82 (07/10/17 0300)  Resp: (!) 22 (07/10/17 0237)  BP: 112/60 (07/10/17 0237)  SpO2: 99 % (07/10/17 0237) Vital Signs (24h Range):  Temp:  [97.8 °F (36.6 °C)-100.1 °F (37.8 °C)] 97.8 °F (36.6 °C)  Pulse:  [] 82  Resp:  [17-24] 22  SpO2:  [97 %-100 %] 99 %  BP: (103-134)/(54-68) 112/60     Weight: 72.6 kg (160 lb 0.9 oz)  Body mass index is 24.34 kg/m².    Physical Exam   Constitutional: He is oriented to person, place, and time.  He appears well-developed and well-nourished. No distress.   Sickly appearing man in NAD   HENT:   Head: Normocephalic.   Mouth/Throat: Oropharynx is clear and moist.   Eyes: Conjunctivae and EOM are normal. Pupils are equal, round, and reactive to light. Right eye exhibits no discharge. Left eye exhibits no discharge. No scleral icterus.   Neck: Normal range of motion. Neck supple. No JVD present.   Cardiovascular: Normal rate, regular rhythm and intact distal pulses.  Exam reveals no gallop and no friction rub.    Murmur heard.  Pulmonary/Chest: Effort normal and breath sounds normal. No stridor. No respiratory distress. He has no wheezes. He has no rales.   Abdominal: Soft. Bowel sounds are normal. He exhibits no distension. There is no tenderness. There is no guarding.   Genitourinary:   Genitourinary Comments: deferred   Musculoskeletal: Normal range of motion. He exhibits tenderness and deformity. He exhibits no edema.   Lymphadenopathy:     He has no cervical adenopathy.   Neurological: He is alert and oriented to person, place, and time. He has normal reflexes.   Skin: Skin is warm and dry. Capillary refill takes 2 to 3 seconds. Rash noted. He is not diaphoretic. No erythema.   Nursing note and vitals reviewed.       Results for orders placed or performed during the hospital encounter of 07/09/17   APTT   Result Value Ref Range    aPTT 32.0 21.0 - 32.0 sec   Brain natriuretic peptide   Result Value Ref Range     (H) 0 - 99 pg/mL   CBC auto differential   Result Value Ref Range    WBC 11.40 3.90 - 12.70 K/uL    RBC 2.29 (L) 4.60 - 6.20 M/uL    Hemoglobin 8.3 (L) 14.0 - 18.0 g/dL    Hematocrit 24.8 (L) 40.0 - 54.0 %     (H) 82 - 98 fL    MCH 36.2 (H) 27.0 - 31.0 pg    MCHC 33.5 32.0 - 36.0 %    RDW 17.1 (H) 11.5 - 14.5 %    Platelets 30 (LL) 150 - 350 K/uL    MPV 9.4 9.2 - 12.9 fL    Lymph # CANCELED 1.0 - 4.8 K/uL    Mono # CANCELED 0.3 - 1.0 K/uL    Eos # CANCELED 0.0 - 0.5 K/uL    Baso #  CANCELED 0.00 - 0.20 K/uL    Gran% 1.0 (L) 38.0 - 73.0 %    Lymph% 44.0 18.0 - 48.0 %    Mono% 9.0 4.0 - 15.0 %    Eosinophil% 1.0 0.0 - 8.0 %    Basophil% 0.0 0.0 - 1.9 %    Metamyelocytes 1.0 %    Myelocytes 1.0 %    Other 43 %    Platelet Estimate Decreased (A)     Aniso Slight     Poik Slight     Poly Occasional     Hypo Occasional     Ovalocytes Occasional     Tear Drop Cells Occasional     Spherocytes Occasional     Schistocytes Present     Smudge Cells Present     Fragmented Cells Occasional     Differential Method Manual    Comprehensive metabolic panel   Result Value Ref Range    Sodium 137 136 - 145 mmol/L    Potassium 3.7 3.5 - 5.1 mmol/L    Chloride 106 95 - 110 mmol/L    CO2 21 (L) 23 - 29 mmol/L    Glucose 132 (H) 70 - 110 mg/dL    BUN, Bld 13 8 - 23 mg/dL    Creatinine 0.8 0.5 - 1.4 mg/dL    Calcium 8.6 (L) 8.7 - 10.5 mg/dL    Total Protein 6.4 6.0 - 8.4 g/dL    Albumin 2.9 (L) 3.5 - 5.2 g/dL    Total Bilirubin 0.4 0.1 - 1.0 mg/dL    Alkaline Phosphatase 61 55 - 135 U/L    AST 20 10 - 40 U/L    ALT 28 10 - 44 U/L    Anion Gap 10 8 - 16 mmol/L    eGFR if African American >60 >60 mL/min/1.73 m^2    eGFR if non African American >60 >60 mL/min/1.73 m^2   Lactic acid, plasma #1   Result Value Ref Range    Lactate (Lactic Acid) 1.4 0.5 - 2.2 mmol/L   Magnesium   Result Value Ref Range    Magnesium 1.9 1.6 - 2.6 mg/dL   Protime-INR   Result Value Ref Range    Prothrombin Time 11.9 9.0 - 12.5 sec    INR 1.1 0.8 - 1.2   Troponin I   Result Value Ref Range    Troponin I 0.102 (H) 0.000 - 0.026 ng/mL   TSH   Result Value Ref Range    TSH 3.897 0.400 - 4.000 uIU/mL   Urinalysis   Result Value Ref Range    Specimen UA Urine, Clean Catch     Color, UA Yellow Yellow, Straw, Tejal    Appearance, UA Clear Clear    pH, UA 7.0 5.0 - 8.0    Specific Gravity, UA 1.010 1.005 - 1.030    Protein, UA Negative Negative    Glucose, UA Negative Negative    Ketones, UA Trace (A) Negative    Bilirubin (UA) Negative Negative     Occult Blood UA Trace (A) Negative    Nitrite, UA Negative Negative    Urobilinogen, UA 4.0-6.0 (A) <2.0 EU/dL    Leukocytes, UA Negative Negative   Type & Screen   Result Value Ref Range    Group & Rh O NEG     Indirect Ute NEG    Prepare RBC 1 Unit   Result Value Ref Range    UNIT NUMBER [id number]     PRODUCT CODE Q7570R32     DISPENSE STATUS CROSSMATCHED     CODING SYSTEM VERR879     Unit Blood Type Code 9500     Unit Blood Type O NEG     Unit Expiration [id number]      Significant Labs: All pertinent labs within the past 24 hours have been reviewed.  Imaging Results          X-Ray Hand 3 view Right (In process)                CTA Chest Non-Coronary (PE Study) (In process)                X-Ray Chest AP Portable (Final result)  Result time 07/09/17 23:28:24    Final result by Carlos Beltran MD (07/09/17 23:28:24)                 Impression:         No acute process. No significant change from prior study.      Electronically signed by: CARLOS BELTRAN MD  Date:     07/09/17  Time:    23:28              Narrative:    Exam: Chest X-ray, one view.    History: Sepsis    Findings: Comparison is made to prior study dated 06/30/2017. Lungs remain clear. Heart size normal. Right chest port remains in place.                            Significant Imaging: I have reviewed and interpreted all pertinent imaging results/findings within the past 24 hours.    Assessment/Plan:     * Symptomatic anemia    Sec to Chemo-- no CHF or Pneumonia  Getting a unit of blood  May need Venofer  Watch closely, may defer chemo for a few days          Fever    Possibly chemo related  No S/s of any sepsis or infection  Start Cefepime empirically          Acute myeloid leukemia not having achieved remission    Pt was scheduled to start next chemo cycle tomorrow but may defer it for a few days          Thrombocytopenia    Chemo related.  No active bleeding going on.  Will watch platelets and transfuse if Platelets < 10 or shows any  bleeding            VTE Risk Mitigation         Ordered     High Risk of VTE  Once      07/10/17 0120     Place sequential compression device  Until discontinued      07/10/17 0120     Reason for No Pharmacological VTE Prophylaxis  Once      07/10/17 0120        Haydee Ward MD  Department of Hospital Medicine   [facility]

## 2017-07-10 NOTE — HPI
Chief Complaint   Patient presents with    Shortness of Breath    Fever         Review of patient's allergies indicates:  No Known Allergies      History obtained from the patient                            History of Present Illness: Carlos Jordan Jr. is a 62 y.o. male patient, with hx of COPD, AML on chemo, presents to the ER for SOB which started gradually today PTA. Pt reports that he is scheduled to receive chemo tomorrow and that last dosage received was over a month ago.  Associated sxs include fever (tmax 100.3) and dry cough. Patient denies any recent travel, long car trips, leg pain/swelling, CP, n/v, and all other sxs at this time. Pt denies taking any breathing tx at home. No further complaints or concerns at this time.     In the ER, pts labs showed a drop in the H/H to 8.3/25 with Platelets 30K. Oncologist Dr. Hudson recommended admit with IVF and giving him a unit of blood and empirically start Cefipime. Pt received a L if NS and is feeling a little better.

## 2017-07-10 NOTE — CONSULTS
[facility]   Hematology/Oncology  Consult Note    Patient Name: Carlos Jordan Jr.  MRN: [id number]  Admission Date: 7/9/2017  Hospital Length of Stay: 0 days  Code Status: Full Code   Attending Provider: Joce Vivas MD  Consulting Provider: Brown Hudson MD  Primary Care Physician: Primary Doctor No  Principal Problem:Symptomatic anemia    Consults  Subjective:     HPI:  62-year-old male who was scheduled to start today third line treatment for relapsed refractory acute myelogenous leukemia with Dacogen presented to the emergency room with fever and weakness was asked by ER physician whether or not to admit and reported non-neutropenic state.  Of told that I would admit to the hospital with blood cultures to make sure patient is not septic before consideration of further chemotherapy.    Oncology Treatment Plan:   OP DECITABINE Q4W    Medications:  Continuous Infusions:   sodium chloride 0.9% 125 mL/hr at 07/10/17 0706     Scheduled Meds:   sodium chloride 0.9%  1,000 mL Intravenous ED 1 Time    ceFEPime (MAXIPIME) IVPB  2 g Intravenous Q8H     PRN Meds:sodium chloride     Review of patient's allergies indicates:  No Known Allergies     Past Medical History:   Diagnosis Date    AML (acute myeloblastic leukemia) 11/04/2016    non -M3     Encounter for blood transfusion     Hypertension     Stroke     R side weakness     Past Surgical History:   Procedure Laterality Date    partial amputation middle finger Right      Family History     Problem Relation (Age of Onset)    Diabetes Father        Social History Main Topics    Smoking status: Current Every Day Smoker     Packs/day: 0.50     Years: 42.00     Types: Cigarettes     Last attempt to quit: 11/3/2016    Smokeless tobacco: Never Used      Comment: no smoking after midnight prior to surgery    Alcohol use No    Drug use: No    Sexual activity: Not on file       Review of Systems   Constitutional: Positive for activity change,  chills and fever. Negative for appetite change, diaphoresis, fatigue and unexpected weight change.   HENT: Negative for congestion, dental problem, drooling, ear discharge, ear pain, facial swelling, hearing loss, mouth sores, nosebleeds, postnasal drip, rhinorrhea, sinus pressure, sneezing, sore throat, tinnitus, trouble swallowing and voice change.    Eyes: Negative for photophobia, pain, discharge, redness, itching and visual disturbance.   Respiratory: Negative for apnea, cough, choking, chest tightness, shortness of breath, wheezing and stridor.    Cardiovascular: Negative for chest pain, palpitations and leg swelling.   Gastrointestinal: Negative for abdominal distention, abdominal pain, anal bleeding, blood in stool, constipation, diarrhea, nausea, rectal pain and vomiting.   Endocrine: Negative for cold intolerance, heat intolerance, polydipsia, polyphagia and polyuria.   Genitourinary: Negative for decreased urine volume, difficulty urinating, discharge, dysuria, enuresis, flank pain, frequency, genital sores, hematuria, penile pain, penile swelling, scrotal swelling, testicular pain and urgency.   Musculoskeletal: Negative for arthralgias, back pain, gait problem, joint swelling, myalgias, neck pain and neck stiffness.   Skin: Negative for color change, pallor, rash and wound.   Allergic/Immunologic: Negative for environmental allergies, food allergies and immunocompromised state.   Neurological: Positive for weakness. Negative for dizziness, tremors, seizures, syncope, facial asymmetry, speech difficulty, light-headedness, numbness and headaches.   Hematological: Negative for adenopathy. Does not bruise/bleed easily.   Psychiatric/Behavioral: Positive for dysphoric mood. Negative for agitation, behavioral problems, confusion, decreased concentration, hallucinations, self-injury, sleep disturbance and suicidal ideas. The patient is nervous/anxious. The patient is not hyperactive.      Objective:     Vital  Signs (Most Recent):  Temp: 97.9 °F (36.6 °C) (07/10/17 0730)  Pulse: 77 (07/10/17 0730)  Resp: 18 (07/10/17 0730)  BP: 103/68 (07/10/17 0730)  SpO2: 100 % (07/10/17 0730) Vital Signs (24h Range):  Temp:  [97.7 °F (36.5 °C)-100.1 °F (37.8 °C)] 97.9 °F (36.6 °C)  Pulse:  [] 77  Resp:  [17-24] 18  SpO2:  [97 %-100 %] 100 %  BP: (100-134)/(54-68) 103/68     Weight: 72.6 kg (160 lb 0.9 oz)  Body mass index is 24.34 kg/m².  Body surface area is 1.87 meters squared.      Intake/Output Summary (Last 24 hours) at 07/10/17 0814  Last data filed at 07/10/17 0600   Gross per 24 hour   Intake              240 ml   Output              500 ml   Net             -260 ml       Physical Exam   Constitutional: He is oriented to person, place, and time. He appears well-developed and well-nourished. He has a sickly appearance. He appears ill. He appears distressed.   HENT:   Head: Normocephalic.   Right Ear: External ear normal.   Left Ear: External ear normal.   Nose: Nose normal. Right sinus exhibits no maxillary sinus tenderness and no frontal sinus tenderness. Left sinus exhibits no maxillary sinus tenderness and no frontal sinus tenderness.   Mouth/Throat: Oropharynx is clear and moist. No oropharyngeal exudate.   Eyes: EOM and lids are normal. Pupils are equal, round, and reactive to light. Right eye exhibits no discharge. Left eye exhibits no discharge. Right conjunctiva is not injected. Right conjunctiva has no hemorrhage. Left conjunctiva is not injected. Left conjunctiva has no hemorrhage. No scleral icterus. Right eye exhibits normal extraocular motion. Left eye exhibits normal extraocular motion.   Neck: Normal range of motion. Neck supple. No JVD present. No tracheal deviation present. No thyromegaly present.   Cardiovascular: Normal rate, regular rhythm and normal heart sounds.    Pulmonary/Chest: Effort normal and breath sounds normal. No stridor. No respiratory distress.   Abdominal: Soft. Bowel sounds are normal.  He exhibits no mass. There is no hepatosplenomegaly, splenomegaly or hepatomegaly. There is no tenderness.   Musculoskeletal: Normal range of motion. He exhibits no edema or tenderness.   Lymphadenopathy:        Head (right side): No posterior auricular and no occipital adenopathy present.        Head (left side): No posterior auricular and no occipital adenopathy present.     He has no cervical adenopathy.        Right cervical: No superficial cervical, no deep cervical and no posterior cervical adenopathy present.       Left cervical: No superficial cervical, no deep cervical and no posterior cervical adenopathy present.     He has no axillary adenopathy.        Right: No supraclavicular adenopathy present.        Left: No supraclavicular adenopathy present.   Neurological: He is alert and oriented to person, place, and time. He has normal strength. No cranial nerve deficit. Coordination normal.   Skin: Skin is dry. No rash noted. He is not diaphoretic. No cyanosis or erythema. Nails show no clubbing.   Psychiatric: His behavior is normal. Judgment and thought content normal. His mood appears anxious. Cognition and memory are normal. He exhibits a depressed mood.   Vitals reviewed.      Significant Labs:   BMP:   Recent Labs  Lab 07/09/17  2304   *      K 3.7      CO2 21*   BUN 13   CREATININE 0.8   CALCIUM 8.6*   MG 1.9   , CBC:   Recent Labs  Lab 07/09/17 2304 07/10/17  0533   WBC 11.40 3.23*   HGB 8.3* 7.8*   HCT 24.8* 23.5*   PLT 30* 28*    and CMP:   Recent Labs  Lab 07/09/17  2304      K 3.7      CO2 21*   *   BUN 13   CREATININE 0.8   CALCIUM 8.6*   PROT 6.4   ALBUMIN 2.9*   BILITOT 0.4   ALKPHOS 61   AST 20   ALT 28   ANIONGAP 10   EGFRNONAA >60       Diagnostic Results:  I have reviewed and interpreted all pertinent imaging results/findings within the past 24 hours.    Assessment/Plan:     Acute myeloid leukemia not having achieved remission    The patient presents  with a possible septic syndrome.  Prior to initiation of any further chemotherapy will make sure that the patient is medically stable before initiation of treatment will discuss with primary oncologist 1 situation is clarified            Thank you for your consult. I will follow-up with patient. Please contact us if you have any additional questions.    Brown Hudson MD  Hematology/Oncology  [facility]

## 2017-07-10 NOTE — ASSESSMENT & PLAN NOTE
Chemo related.  No active bleeding going on.  Will watch platelets and transfuse if Platelets < 10 or shows any bleeding

## 2017-07-10 NOTE — SUBJECTIVE & OBJECTIVE
Oncology Treatment Plan:   OP DECITABINE Q4W    Medications:  Continuous Infusions:   sodium chloride 0.9% 125 mL/hr at 07/10/17 0706     Scheduled Meds:   sodium chloride 0.9%  1,000 mL Intravenous ED 1 Time    ceFEPime (MAXIPIME) IVPB  2 g Intravenous Q8H     PRN Meds:sodium chloride     Review of patient's allergies indicates:  No Known Allergies     Past Medical History:   Diagnosis Date    AML (acute myeloblastic leukemia) 11/04/2016    non -M3     Encounter for blood transfusion     Hypertension     Stroke     R side weakness     Past Surgical History:   Procedure Laterality Date    partial amputation middle finger Right      Family History     Problem Relation (Age of Onset)    Diabetes Father        Social History Main Topics    Smoking status: Current Every Day Smoker     Packs/day: 0.50     Years: 42.00     Types: Cigarettes     Last attempt to quit: 11/3/2016    Smokeless tobacco: Never Used      Comment: no smoking after midnight prior to surgery    Alcohol use No    Drug use: No    Sexual activity: Not on file       Review of Systems   Constitutional: Positive for activity change, chills and fever. Negative for appetite change, diaphoresis, fatigue and unexpected weight change.   HENT: Negative for congestion, dental problem, drooling, ear discharge, ear pain, facial swelling, hearing loss, mouth sores, nosebleeds, postnasal drip, rhinorrhea, sinus pressure, sneezing, sore throat, tinnitus, trouble swallowing and voice change.    Eyes: Negative for photophobia, pain, discharge, redness, itching and visual disturbance.   Respiratory: Negative for apnea, cough, choking, chest tightness, shortness of breath, wheezing and stridor.    Cardiovascular: Negative for chest pain, palpitations and leg swelling.   Gastrointestinal: Negative for abdominal distention, abdominal pain, anal bleeding, blood in stool, constipation, diarrhea, nausea, rectal pain and vomiting.   Endocrine: Negative for cold  intolerance, heat intolerance, polydipsia, polyphagia and polyuria.   Genitourinary: Negative for decreased urine volume, difficulty urinating, discharge, dysuria, enuresis, flank pain, frequency, genital sores, hematuria, penile pain, penile swelling, scrotal swelling, testicular pain and urgency.   Musculoskeletal: Negative for arthralgias, back pain, gait problem, joint swelling, myalgias, neck pain and neck stiffness.   Skin: Negative for color change, pallor, rash and wound.   Allergic/Immunologic: Negative for environmental allergies, food allergies and immunocompromised state.   Neurological: Positive for weakness. Negative for dizziness, tremors, seizures, syncope, facial asymmetry, speech difficulty, light-headedness, numbness and headaches.   Hematological: Negative for adenopathy. Does not bruise/bleed easily.   Psychiatric/Behavioral: Positive for dysphoric mood. Negative for agitation, behavioral problems, confusion, decreased concentration, hallucinations, self-injury, sleep disturbance and suicidal ideas. The patient is nervous/anxious. The patient is not hyperactive.      Objective:     Vital Signs (Most Recent):  Temp: 97.9 °F (36.6 °C) (07/10/17 0730)  Pulse: 77 (07/10/17 0730)  Resp: 18 (07/10/17 0730)  BP: 103/68 (07/10/17 0730)  SpO2: 100 % (07/10/17 0730) Vital Signs (24h Range):  Temp:  [97.7 °F (36.5 °C)-100.1 °F (37.8 °C)] 97.9 °F (36.6 °C)  Pulse:  [] 77  Resp:  [17-24] 18  SpO2:  [97 %-100 %] 100 %  BP: (100-134)/(54-68) 103/68     Weight: 72.6 kg (160 lb 0.9 oz)  Body mass index is 24.34 kg/m².  Body surface area is 1.87 meters squared.      Intake/Output Summary (Last 24 hours) at 07/10/17 0814  Last data filed at 07/10/17 0600   Gross per 24 hour   Intake              240 ml   Output              500 ml   Net             -260 ml       Physical Exam   Constitutional: He is oriented to person, place, and time. He appears well-developed and well-nourished. He has a sickly appearance.  He appears ill. He appears distressed.   HENT:   Head: Normocephalic.   Right Ear: External ear normal.   Left Ear: External ear normal.   Nose: Nose normal. Right sinus exhibits no maxillary sinus tenderness and no frontal sinus tenderness. Left sinus exhibits no maxillary sinus tenderness and no frontal sinus tenderness.   Mouth/Throat: Oropharynx is clear and moist. No oropharyngeal exudate.   Eyes: EOM and lids are normal. Pupils are equal, round, and reactive to light. Right eye exhibits no discharge. Left eye exhibits no discharge. Right conjunctiva is not injected. Right conjunctiva has no hemorrhage. Left conjunctiva is not injected. Left conjunctiva has no hemorrhage. No scleral icterus. Right eye exhibits normal extraocular motion. Left eye exhibits normal extraocular motion.   Neck: Normal range of motion. Neck supple. No JVD present. No tracheal deviation present. No thyromegaly present.   Cardiovascular: Normal rate, regular rhythm and normal heart sounds.    Pulmonary/Chest: Effort normal and breath sounds normal. No stridor. No respiratory distress.   Abdominal: Soft. Bowel sounds are normal. He exhibits no mass. There is no hepatosplenomegaly, splenomegaly or hepatomegaly. There is no tenderness.   Musculoskeletal: Normal range of motion. He exhibits no edema or tenderness.   Lymphadenopathy:        Head (right side): No posterior auricular and no occipital adenopathy present.        Head (left side): No posterior auricular and no occipital adenopathy present.     He has no cervical adenopathy.        Right cervical: No superficial cervical, no deep cervical and no posterior cervical adenopathy present.       Left cervical: No superficial cervical, no deep cervical and no posterior cervical adenopathy present.     He has no axillary adenopathy.        Right: No supraclavicular adenopathy present.        Left: No supraclavicular adenopathy present.   Neurological: He is alert and oriented to person,  place, and time. He has normal strength. No cranial nerve deficit. Coordination normal.   Skin: Skin is dry. No rash noted. He is not diaphoretic. No cyanosis or erythema. Nails show no clubbing.   Psychiatric: His behavior is normal. Judgment and thought content normal. His mood appears anxious. Cognition and memory are normal. He exhibits a depressed mood.   Vitals reviewed.      Significant Labs:   BMP:   Recent Labs  Lab 07/09/17 2304   *      K 3.7      CO2 21*   BUN 13   CREATININE 0.8   CALCIUM 8.6*   MG 1.9   , CBC:   Recent Labs  Lab 07/09/17  2304 07/10/17  0533   WBC 11.40 3.23*   HGB 8.3* 7.8*   HCT 24.8* 23.5*   PLT 30* 28*    and CMP:   Recent Labs  Lab 07/09/17 2304      K 3.7      CO2 21*   *   BUN 13   CREATININE 0.8   CALCIUM 8.6*   PROT 6.4   ALBUMIN 2.9*   BILITOT 0.4   ALKPHOS 61   AST 20   ALT 28   ANIONGAP 10   EGFRNONAA >60       Diagnostic Results:  I have reviewed and interpreted all pertinent imaging results/findings within the past 24 hours.

## 2017-07-10 NOTE — ED NOTES
Pt and daughter explained benefits and risk factors or receiving blood transfusion. Stated he has received transfusion in past. Verbalized understanding. Consent form signed by daughter and placed on chart.

## 2017-07-10 NOTE — ASSESSMENT & PLAN NOTE
The patient presents with a possible septic syndrome.  Prior to initiation of any further chemotherapy will make sure that the patient is medically stable before initiation of treatment will discuss with primary oncologist 1 situation is clarified

## 2017-07-10 NOTE — PLAN OF CARE
CM met with patient at the bedside to assess for discharge needs.  Patient states that he lives at home alone. He has a daughter who lives within two miles of his home, but is unable to provide her last name or phone number.  Patient states he is independent and does not anticipate any discharge needs.  Patient is not established with a PCP at this time.  CM offered to set up an appointment for hospital follow up, however patient declined for an appointment to be scheduled.  CM provided transitional care folder, information on pharmacy bedside delivery, information on advanced directives, and discharge planning begins on admission with contact information for any needs/questions.      ALFREDO handed off to SHAMEKA Guardado     07/10/17 0623   Discharge Assessment   Assessment Type Discharge Planning Assessment   Confirmed/corrected address and phone number on facesheet? Yes   Assessment information obtained from? Patient;Medical Record   Expected Length of Stay (days) (1-2)   Communicated expected length of stay with patient/caregiver yes   Prior to hospitilization cognitive status: Alert/Oriented   Prior to hospitalization functional status: Independent   Current cognitive status: Alert/Oriented   Current Functional Status: Independent   Arrived From admitted as an inpatient;home or self-care   Lives With alone   Able to Return to Prior Arrangements yes   Is patient able to care for self after discharge? Yes   How many people do you have in your home that can help with your care after discharge? 0   Who are your caregiver(s) and their phone number(s)? (Patient unable to provide a )   Patient's perception of discharge disposition home or selfcare   Readmission Within The Last 30 Days no previous admission in last 30 days   Patient currently being followed by outpatient case management? No   Patient currently receives home health services? No   Does the patient currently use HME? No   Patient currently receives  private duty nursing? No   Patient currently receives any other outside agency services? No   Equipment Currently Used at Home none   Do you have any problems affording any of your prescribed medications? No   Is the patient taking medications as prescribed? yes   Do you have any financial concerns preventing you from receiving the healthcare you need? No   Does the patient have transportation to healthcare appointments? Yes   Transportation Available car   On Dialysis? No   Does the patient receive services at the [facility]? No   Are there any open cases? No   Discharge Plan A Home   Discharge Plan B Home   Patient/Family In Agreement With Plan yes

## 2017-07-10 NOTE — ED NOTES
Patient placed on continuous cardiac monitor, automatic blood pressure cuff and continuous pulse oximeter. Tachycardia on CM

## 2017-07-11 PROBLEM — R79.89 ELEVATED TROPONIN: Status: ACTIVE | Noted: 2017-07-11

## 2017-07-11 PROBLEM — I48.91 ATRIAL FIBRILLATION WITH RVR: Status: ACTIVE | Noted: 2017-07-11

## 2017-07-11 PROBLEM — I50.33 ACUTE ON CHRONIC DIASTOLIC CONGESTIVE HEART FAILURE: Status: ACTIVE | Noted: 2017-07-11

## 2017-07-11 LAB
ALBUMIN SERPL BCP-MCNC: 2.5 G/DL
ANION GAP SERPL CALC-SCNC: 10 MMOL/L
ANISOCYTOSIS BLD QL SMEAR: SLIGHT
BACTERIA UR CULT: NO GROWTH
BASOPHILS # BLD AUTO: ABNORMAL K/UL
BASOPHILS NFR BLD: 0 %
BLASTS NFR BLD MANUAL: 31 %
BNP SERPL-MCNC: 591 PG/ML
BUN SERPL-MCNC: 19 MG/DL
CALCIUM SERPL-MCNC: 8.5 MG/DL
CHLORIDE SERPL-SCNC: 113 MMOL/L
CO2 SERPL-SCNC: 19 MMOL/L
CREAT SERPL-MCNC: 0.8 MG/DL
DACRYOCYTES BLD QL SMEAR: ABNORMAL
DIASTOLIC DYSFUNCTION: YES
DIFFERENTIAL METHOD: ABNORMAL
EOSINOPHIL # BLD AUTO: ABNORMAL K/UL
EOSINOPHIL NFR BLD: 1 %
ERYTHROCYTE [DISTWIDTH] IN BLOOD BY AUTOMATED COUNT: 20.5 %
EST. GFR  (AFRICAN AMERICAN): >60 ML/MIN/1.73 M^2
EST. GFR  (NON AFRICAN AMERICAN): >60 ML/MIN/1.73 M^2
ESTIMATED PA SYSTOLIC PRESSURE: 61
GLUCOSE SERPL-MCNC: 152 MG/DL
HCT VFR BLD AUTO: 27.4 %
HGB BLD-MCNC: 9.1 G/DL
HYPOCHROMIA BLD QL SMEAR: ABNORMAL
LACTATE SERPL-SCNC: 2.6 MMOL/L
LYMPHOCYTES # BLD AUTO: ABNORMAL K/UL
LYMPHOCYTES NFR BLD: 53 %
MCH RBC QN AUTO: 35 PG
MCHC RBC AUTO-ENTMCNC: 33.2 %
MCV RBC AUTO: 105 FL
MITRAL VALVE REGURGITATION: ABNORMAL
MONOCYTES # BLD AUTO: ABNORMAL K/UL
MONOCYTES NFR BLD: 12 %
NEUTROPHILS NFR BLD: 2 %
NEUTS BAND NFR BLD MANUAL: 1 %
OVALOCYTES BLD QL SMEAR: ABNORMAL
PHOSPHATE SERPL-MCNC: 3.6 MG/DL
PLATELET # BLD AUTO: 33 K/UL
PLATELET BLD QL SMEAR: ABNORMAL
PMV BLD AUTO: 10.6 FL
POIKILOCYTOSIS BLD QL SMEAR: SLIGHT
POLYCHROMASIA BLD QL SMEAR: ABNORMAL
POTASSIUM SERPL-SCNC: 4.4 MMOL/L
RBC # BLD AUTO: 2.6 M/UL
RETIRED EF AND QEF - SEE NOTES: 30 (ref 55–65)
SCHISTOCYTES BLD QL SMEAR: ABNORMAL
SCHISTOCYTES BLD QL SMEAR: PRESENT
SMUDGE CELLS BLD QL SMEAR: PRESENT
SODIUM SERPL-SCNC: 142 MMOL/L
SPHEROCYTES BLD QL SMEAR: ABNORMAL
TRICUSPID VALVE REGURGITATION: ABNORMAL
TROPONIN I SERPL DL<=0.01 NG/ML-MCNC: 0.11 NG/ML
TROPONIN I SERPL DL<=0.01 NG/ML-MCNC: 0.21 NG/ML
TROPONIN I SERPL DL<=0.01 NG/ML-MCNC: 0.23 NG/ML
WBC # BLD AUTO: 10.29 K/UL

## 2017-07-11 PROCEDURE — 63600175 PHARM REV CODE 636 W HCPCS: Performed by: EMERGENCY MEDICINE

## 2017-07-11 PROCEDURE — 25000003 PHARM REV CODE 250: Performed by: INTERNAL MEDICINE

## 2017-07-11 PROCEDURE — 93005 ELECTROCARDIOGRAM TRACING: CPT

## 2017-07-11 PROCEDURE — 84484 ASSAY OF TROPONIN QUANT: CPT | Mod: 91

## 2017-07-11 PROCEDURE — 99900035 HC TECH TIME PER 15 MIN (STAT)

## 2017-07-11 PROCEDURE — 85007 BL SMEAR W/DIFF WBC COUNT: CPT

## 2017-07-11 PROCEDURE — 93306 TTE W/DOPPLER COMPLETE: CPT | Mod: 26,,, | Performed by: INTERNAL MEDICINE

## 2017-07-11 PROCEDURE — 93010 ELECTROCARDIOGRAM REPORT: CPT | Mod: ,,, | Performed by: INTERNAL MEDICINE

## 2017-07-11 PROCEDURE — 99232 SBSQ HOSP IP/OBS MODERATE 35: CPT | Mod: ,,, | Performed by: INTERNAL MEDICINE

## 2017-07-11 PROCEDURE — 85027 COMPLETE CBC AUTOMATED: CPT

## 2017-07-11 PROCEDURE — 80069 RENAL FUNCTION PANEL: CPT

## 2017-07-11 PROCEDURE — 99223 1ST HOSP IP/OBS HIGH 75: CPT | Mod: ,,, | Performed by: INTERNAL MEDICINE

## 2017-07-11 PROCEDURE — 94640 AIRWAY INHALATION TREATMENT: CPT

## 2017-07-11 PROCEDURE — 83605 ASSAY OF LACTIC ACID: CPT

## 2017-07-11 PROCEDURE — 25000242 PHARM REV CODE 250 ALT 637 W/ HCPCS: Performed by: INTERNAL MEDICINE

## 2017-07-11 PROCEDURE — 83880 ASSAY OF NATRIURETIC PEPTIDE: CPT

## 2017-07-11 PROCEDURE — 27000221 HC OXYGEN, UP TO 24 HOURS

## 2017-07-11 PROCEDURE — 93306 TTE W/DOPPLER COMPLETE: CPT

## 2017-07-11 PROCEDURE — 63600175 PHARM REV CODE 636 W HCPCS: Performed by: INTERNAL MEDICINE

## 2017-07-11 PROCEDURE — 36415 COLL VENOUS BLD VENIPUNCTURE: CPT

## 2017-07-11 PROCEDURE — 84484 ASSAY OF TROPONIN QUANT: CPT

## 2017-07-11 PROCEDURE — 21400001 HC TELEMETRY ROOM

## 2017-07-11 RX ORDER — DILTIAZEM HYDROCHLORIDE 5 MG/ML
15 INJECTION INTRAVENOUS ONCE
Status: COMPLETED | OUTPATIENT
Start: 2017-07-11 | End: 2017-07-11

## 2017-07-11 RX ORDER — FUROSEMIDE 10 MG/ML
20 INJECTION INTRAMUSCULAR; INTRAVENOUS ONCE
Status: DISCONTINUED | OUTPATIENT
Start: 2017-07-11 | End: 2017-07-11

## 2017-07-11 RX ORDER — IPRATROPIUM BROMIDE AND ALBUTEROL SULFATE 2.5; .5 MG/3ML; MG/3ML
3 SOLUTION RESPIRATORY (INHALATION) EVERY 4 HOURS PRN
Status: DISCONTINUED | OUTPATIENT
Start: 2017-07-11 | End: 2017-07-13 | Stop reason: HOSPADM

## 2017-07-11 RX ORDER — ASPIRIN 81 MG/1
81 TABLET ORAL DAILY
Status: DISCONTINUED | OUTPATIENT
Start: 2017-07-11 | End: 2017-07-13 | Stop reason: HOSPADM

## 2017-07-11 RX ORDER — ACETAMINOPHEN 500 MG
500 TABLET ORAL ONCE
Status: COMPLETED | OUTPATIENT
Start: 2017-07-11 | End: 2017-07-11

## 2017-07-11 RX ORDER — METOPROLOL SUCCINATE 25 MG/1
25 TABLET, EXTENDED RELEASE ORAL 2 TIMES DAILY
Status: DISCONTINUED | OUTPATIENT
Start: 2017-07-11 | End: 2017-07-12

## 2017-07-11 RX ORDER — FUROSEMIDE 10 MG/ML
20 INJECTION INTRAMUSCULAR; INTRAVENOUS DAILY
Status: DISCONTINUED | OUTPATIENT
Start: 2017-07-11 | End: 2017-07-12

## 2017-07-11 RX ORDER — DILTIAZEM HYDROCHLORIDE 30 MG/1
30 TABLET, FILM COATED ORAL EVERY 6 HOURS
Status: DISCONTINUED | OUTPATIENT
Start: 2017-07-11 | End: 2017-07-11

## 2017-07-11 RX ADMIN — CEFEPIME HYDROCHLORIDE 2 G: 2 INJECTION, SOLUTION INTRAVENOUS at 09:07

## 2017-07-11 RX ADMIN — METOPROLOL SUCCINATE 25 MG: 25 TABLET, EXTENDED RELEASE ORAL at 11:07

## 2017-07-11 RX ADMIN — METOPROLOL SUCCINATE 25 MG: 25 TABLET, EXTENDED RELEASE ORAL at 09:07

## 2017-07-11 RX ADMIN — CEFEPIME HYDROCHLORIDE 2 G: 2 INJECTION, SOLUTION INTRAVENOUS at 07:07

## 2017-07-11 RX ADMIN — NITROGLYCERIN 0.4 MG: 0.4 TABLET SUBLINGUAL at 07:07

## 2017-07-11 RX ADMIN — FUROSEMIDE 20 MG: 10 INJECTION, SOLUTION INTRAMUSCULAR; INTRAVENOUS at 09:07

## 2017-07-11 RX ADMIN — SODIUM CHLORIDE 500 ML: 0.9 INJECTION, SOLUTION INTRAVENOUS at 03:07

## 2017-07-11 RX ADMIN — ASPIRIN 81 MG: 81 TABLET, COATED ORAL at 09:07

## 2017-07-11 RX ADMIN — ACETAMINOPHEN 500 MG: 500 TABLET ORAL at 03:07

## 2017-07-11 RX ADMIN — IPRATROPIUM BROMIDE AND ALBUTEROL SULFATE 3 ML: .5; 3 SOLUTION RESPIRATORY (INHALATION) at 04:07

## 2017-07-11 RX ADMIN — DILTIAZEM HYDROCHLORIDE 15 MG: 5 INJECTION INTRAVENOUS at 09:07

## 2017-07-11 NOTE — NURSING
EKG given to Dr. Viera, he ordered the nitroglycerin to be held and for repeat troponin labs to be drawn Q6 x 3. Will continue to monitor.

## 2017-07-11 NOTE — NURSING
Pt complained of chest pain radiating to bilateral arms 5/10 with shortness of breath. Contacted Dr. Viera, he ordered Tylenol 500mg PO one time dose and NS 500mL bolus once to be given now. Will continue to monitor.

## 2017-07-11 NOTE — SUBJECTIVE & OBJECTIVE
Interval History:  Weakness and fatigue    Review of Systems   Constitutional: Negative for chills and fever.   HENT: Negative.  Negative for congestion and sore throat.    Eyes: Negative.  Negative for visual disturbance.   Respiratory: Positive for shortness of breath. Negative for cough and wheezing.    Cardiovascular: Negative.  Negative for chest pain.   Gastrointestinal: Negative for abdominal pain, diarrhea, nausea and vomiting.   Endocrine: Negative.    Genitourinary: Negative.    Musculoskeletal: Negative.  Negative for myalgias and neck stiffness.   Skin: Negative.  Negative for color change and pallor.   Allergic/Immunologic: Negative.    Neurological: Positive for weakness.   Hematological: Negative.    Psychiatric/Behavioral: Negative.    All other systems reviewed and are negative.    Objective:     Vital Signs (Most Recent):  Temp: 98.1 °F (36.7 °C) (07/10/17 2020)  Pulse: 81 (07/10/17 2020)  Resp: 20 (07/10/17 2020)  BP: 112/72 (07/10/17 2020)  SpO2: 95 % (07/10/17 2020) Vital Signs (24h Range):  Temp:  [97.7 °F (36.5 °C)-100.1 °F (37.8 °C)] 98.1 °F (36.7 °C)  Pulse:  [] 81  Resp:  [17-24] 20  SpO2:  [95 %-100 %] 95 %  BP: (100-134)/(54-72) 112/72     Weight: 72.6 kg (160 lb 0.9 oz)  Body mass index is 24.34 kg/m².    Intake/Output Summary (Last 24 hours) at 07/10/17 2117  Last data filed at 07/10/17 1800   Gross per 24 hour   Intake          2704.17 ml   Output              500 ml   Net          2204.17 ml      Physical Exam   Constitutional: He is oriented to person, place, and time. He appears well-developed and well-nourished. No distress.   HENT:   Head: Normocephalic and atraumatic.   Mouth/Throat: Oropharynx is clear and moist.   Eyes: Conjunctivae and EOM are normal. Pupils are equal, round, and reactive to light.   Neck: No JVD present. No thyromegaly present.   Cardiovascular: Normal rate, regular rhythm and normal heart sounds.  Exam reveals no gallop and no friction rub.    No  murmur heard.  Pulmonary/Chest: Effort normal and breath sounds normal. No respiratory distress. He has no wheezes. He has no rales.   Abdominal: Soft. Bowel sounds are normal. He exhibits no distension. There is no tenderness. There is no rebound and no guarding.   Musculoskeletal: Normal range of motion. He exhibits no edema or tenderness.   Lymphadenopathy:     He has no cervical adenopathy.   Neurological: He is alert and oriented to person, place, and time. He has normal reflexes. He displays normal reflexes. No cranial nerve deficit.   Skin: Skin is warm and dry. No rash noted. He is not diaphoretic. No erythema.   Psychiatric: He has a normal mood and affect. His behavior is normal. Judgment and thought content normal.       Significant Labs:   CBC:   Recent Labs  Lab 07/09/17  2304 07/10/17  0533 07/10/17  0816   WBC 11.40 3.23*  --    HGB 8.3* 7.8* 8.6*   HCT 24.8* 23.5* 25.8*   PLT 30* 28*  --      CMP:   Recent Labs  Lab 07/09/17  2304      K 3.7      CO2 21*   *   BUN 13   CREATININE 0.8   CALCIUM 8.6*   PROT 6.4   ALBUMIN 2.9*   BILITOT 0.4   ALKPHOS 61   AST 20   ALT 28   ANIONGAP 10   EGFRNONAA >60       Significant Imaging: I have reviewed all pertinent imaging results/findings within the past 24 hours.

## 2017-07-11 NOTE — ASSESSMENT & PLAN NOTE
Chemo/AML related.  No signs of bleeding.  Will watch platelets and transfuse if Platelets < 10 or shows any bleeding.

## 2017-07-11 NOTE — ASSESSMENT & PLAN NOTE
Probably due to heart strain from atrial fibrillation with RVR but may also be from MI.  Trend troponin and Cardiology to evaluate.

## 2017-07-11 NOTE — PROGRESS NOTES
[facility] -   Hematology/Oncology  Progress Note    Patient Name: Carlos Jordan Jr.  Admission Date: 7/9/2017  Hospital Length of Stay: 1 days  Code Status: Full Code     Subjective:     HPI:  62-year-old male who was scheduled to start today third line treatment for relapsed refractory acute myelogenous leukemia with Dacogen presented to the emergency room with fever and weakness was asked by ER physician whether or not to admit and reported non-neutropenic state.  Of told that I would admit to the hospital with blood cultures to make sure patient is not septic before consideration of further chemotherapy.    Interval History: When I went to the floor this morning spoke to hospital medicine patient is in atrial fibrillation with rapid ventricular  response    Oncology Treatment Plan:   OP DECITABINE Q4W    Medications:  Continuous Infusions:   sodium chloride 0.9% 125 mL/hr at 07/10/17 2351     Scheduled Meds:   ceFEPime (MAXIPIME) IVPB  2 g Intravenous Q8H    diltiaZEM  15 mg Intravenous Once     PRN Meds:sodium chloride, albuterol-ipratropium 2.5mg-0.5mg/3mL, calcium carbonate, nitroGLYCERIN, ramelteon     Review of Systems   Constitutional: Positive for activity change and fatigue. Negative for appetite change, chills, diaphoresis, fever and unexpected weight change.   HENT: Negative for congestion, dental problem, drooling, ear discharge, ear pain, facial swelling, hearing loss, mouth sores, nosebleeds, postnasal drip, rhinorrhea, sinus pressure, sneezing, sore throat, tinnitus, trouble swallowing and voice change.    Eyes: Negative for photophobia, pain, discharge, redness, itching and visual disturbance.   Respiratory: Positive for shortness of breath. Negative for apnea, cough, choking, chest tightness, wheezing and stridor.    Cardiovascular: Positive for palpitations. Negative for chest pain and leg swelling.   Gastrointestinal: Negative for abdominal distention, abdominal pain, anal  bleeding, blood in stool, constipation, diarrhea, nausea, rectal pain and vomiting.   Endocrine: Negative for cold intolerance, heat intolerance, polydipsia, polyphagia and polyuria.   Genitourinary: Negative for decreased urine volume, difficulty urinating, discharge, dysuria, enuresis, flank pain, frequency, genital sores, hematuria, penile pain, penile swelling, scrotal swelling, testicular pain and urgency.   Musculoskeletal: Negative for arthralgias, back pain, gait problem, joint swelling, myalgias, neck pain and neck stiffness.   Skin: Negative for color change, pallor, rash and wound.   Allergic/Immunologic: Negative for environmental allergies, food allergies and immunocompromised state.   Neurological: Positive for weakness. Negative for dizziness, tremors, seizures, syncope, facial asymmetry, speech difficulty, light-headedness, numbness and headaches.   Hematological: Negative for adenopathy. Does not bruise/bleed easily.   Psychiatric/Behavioral: Negative for agitation, behavioral problems, confusion, decreased concentration, dysphoric mood, hallucinations, self-injury, sleep disturbance and suicidal ideas. The patient is not nervous/anxious and is not hyperactive.      Objective:     Vital Signs (Most Recent):  Temp: 97.7 °F (36.5 °C) (07/11/17 0302)  Pulse: (!) 137 (according to cardiac monitor.) (07/11/17 0748)  Resp: (!) 24 (07/11/17 0748)  BP: 128/72 (07/11/17 0745)  SpO2: 95 % (07/11/17 0748) Vital Signs (24h Range):  Temp:  [97.6 °F (36.4 °C)-98.1 °F (36.7 °C)] 97.7 °F (36.5 °C)  Pulse:  [] 137  Resp:  [18-46] 24  SpO2:  [88 %-99 %] 95 %  BP: ()/(45-85) 128/72     Weight: 75.4 kg (166 lb 3.2 oz)  Body mass index is 25.27 kg/m².  Body surface area is 1.9 meters squared.      Intake/Output Summary (Last 24 hours) at 07/11/17 0838  Last data filed at 07/11/17 0600   Gross per 24 hour   Intake           4612.5 ml   Output                0 ml   Net           4612.5 ml       Physical Exam    Constitutional: He is oriented to person, place, and time. He appears well-developed and well-nourished. He appears cachectic. He has a sickly appearance. He appears ill. He appears distressed.   HENT:   Head: Normocephalic.   Right Ear: External ear normal.   Left Ear: External ear normal.   Nose: Nose normal. Right sinus exhibits no maxillary sinus tenderness and no frontal sinus tenderness. Left sinus exhibits no maxillary sinus tenderness and no frontal sinus tenderness.   Mouth/Throat: Oropharynx is clear and moist. No oropharyngeal exudate.   Eyes: EOM and lids are normal. Pupils are equal, round, and reactive to light. Right eye exhibits no discharge. Left eye exhibits no discharge. Right conjunctiva is not injected. Right conjunctiva has no hemorrhage. Left conjunctiva is not injected. Left conjunctiva has no hemorrhage. No scleral icterus. Right eye exhibits normal extraocular motion. Left eye exhibits normal extraocular motion.   Neck: Normal range of motion. Neck supple. No JVD present. No tracheal deviation present. No thyromegaly present.   Cardiovascular: Normal rate, regular rhythm and normal heart sounds.    Atrial fibrillation with rapid ventricular response   Pulmonary/Chest: Breath sounds normal. No stridor. He is in respiratory distress.   Abdominal: Soft. Bowel sounds are normal. He exhibits no mass. There is no hepatosplenomegaly, splenomegaly or hepatomegaly. There is no tenderness.   Musculoskeletal: Normal range of motion. He exhibits no edema or tenderness.   Lymphadenopathy:        Head (right side): No posterior auricular and no occipital adenopathy present.        Head (left side): No posterior auricular and no occipital adenopathy present.     He has no cervical adenopathy.        Right cervical: No superficial cervical, no deep cervical and no posterior cervical adenopathy present.       Left cervical: No superficial cervical, no deep cervical and no posterior cervical adenopathy  present.     He has no axillary adenopathy.        Right: No supraclavicular adenopathy present.        Left: No supraclavicular adenopathy present.   Neurological: He is alert and oriented to person, place, and time. He has normal strength. No cranial nerve deficit. Coordination normal.   Skin: Skin is dry. No rash noted. He is not diaphoretic. No cyanosis or erythema. Nails show no clubbing.   Psychiatric: His behavior is normal. Judgment and thought content normal. His mood appears anxious. Cognition and memory are normal. He exhibits a depressed mood.   Vitals reviewed.      Significant Labs:   BMP:   Recent Labs  Lab 07/09/17 2304 07/11/17  0404   * 152*    142   K 3.7 4.4    113*   CO2 21* 19*   BUN 13 19   CREATININE 0.8 0.8   CALCIUM 8.6* 8.5*   MG 1.9  --    , CBC:   Recent Labs  Lab 07/09/17  2304 07/10/17  0533 07/10/17  0816 07/11/17  0404   WBC 11.40 3.23*  --  10.29   HGB 8.3* 7.8* 8.6* 9.1*   HCT 24.8* 23.5* 25.8* 27.4*   PLT 30* 28*  --  33*    and CMP:   Recent Labs  Lab 07/09/17 2304 07/11/17  0404    142   K 3.7 4.4    113*   CO2 21* 19*   * 152*   BUN 13 19   CREATININE 0.8 0.8   CALCIUM 8.6* 8.5*   PROT 6.4  --    ALBUMIN 2.9* 2.5*   BILITOT 0.4  --    ALKPHOS 61  --    AST 20  --    ALT 28  --    ANIONGAP 10 10   EGFRNONAA >60 >60       Diagnostic Results:  I have reviewed and interpreted all pertinent imaging results/findings within the past 24 hours.    Assessment/Plan:     * Acute myeloid leukemia not having achieved remission    The patient presents with a possible septic syndrome.  Prior to initiation of any further chemotherapy will make sure that the patient is medically stable before initiation of treatment will discuss with primary oncologist 1 situation is clarified.  Patient and rapid ventricular response major fibrillation medically not stable at this point will need to discuss when patient is medical stable as to whether or not to proceed  with treatment for third line treatment for acute myelogenous leukemia with primary oncologist            Thank you for your consult. I will follow-up with patient. Please contact us if you have any additional questions.     Brown Hudson MD  Hematology/Oncology  [facility]

## 2017-07-11 NOTE — PROGRESS NOTES
[facility] Hospital Medicine  Progress Note    Patient Name: Carlos Jordan Jr.  MRN: [id number]  Patient Class: IP- Inpatient   Admission Date: 7/9/2017  Length of Stay: 1 days  Attending Physician: Joce Vivas MD  Primary Care Provider: Primary Doctor No        Subjective:     Principal Problem:Symptomatic anemia    HPI:  Chief Complaint   Patient presents with    Shortness of Breath    Fever         Review of patient's allergies indicates:  No Known Allergies      History obtained from the patient                            History of Present Illness: Carlos Jordan Jr. is a 62 y.o. male patient, with hx of COPD, AML on chemo, present s to the ER for SOB which started gradually today PTA. Pt reports that he is scheduled to receive chemo tomorrow and that last dosage received was over a month ago.  Associated sxs include fever (tmax 100.3) and dry cough. Patient denies any recent travel, long car trips, leg pain/swelling, CP, n/v, and all other sxs at this time. Pt denies taking any breathing tx at home. No further complaints or concerns at this time.     In the ER, pts labs showed a drop in the H/H to 8.3/25 with Platelets 30K. Oncologist Dr. Hudson recommended admit with IVF and giving him a unit of blood and empirically start Cefipime. Pt received a L if NS and is feeling a little better.     Hospital Course:  11 July:  Two episodes of chest pain overnight and rhythm change to Afib with RVR.  Troponin elevated with an elevation to BNP.  No fevers.  Daily blood counts with significant amount of blasts.  Oncology electing not to treat given his third recurrence.    Interval History:  Two episodes of chest pain last night.  The first at 10 pm then again around 4 am with an irregular tachycardia.  He also endorses radiation of the pain to his arms.  Each episode lasted about an hour and he feels short of breath ow but the chest pain is gone.    Review of Systems   Constitutional: Negative for  chills and fever.   HENT: Negative.  Negative for congestion and sore throat.    Eyes: Negative.  Negative for visual disturbance.   Respiratory: Positive for shortness of breath. Negative for cough and wheezing.    Cardiovascular: Positive for chest pain.   Gastrointestinal: Negative for abdominal pain, diarrhea, nausea and vomiting.   Endocrine: Negative.    Genitourinary: Negative.    Musculoskeletal: Negative.  Negative for myalgias and neck stiffness.   Skin: Negative.  Negative for color change and pallor.   Allergic/Immunologic: Negative.    Neurological: Positive for weakness.   Hematological: Negative.    Psychiatric/Behavioral: Negative.    All other systems reviewed and are negative.    Objective:     Vital Signs (Most Recent):  Temp: 97.7 °F (36.5 °C) (07/11/17 0302)  Pulse: (!) 137 (according to cardiac monitor.) (07/11/17 0748)  Resp: (!) 24 (07/11/17 0748)  BP: 128/72 (07/11/17 0745)  SpO2: 95 % (07/11/17 0748) Vital Signs (24h Range):  Temp:  [97.6 °F (36.4 °C)-98.1 °F (36.7 °C)] 97.7 °F (36.5 °C)  Pulse:  [] 137  Resp:  [18-46] 24  SpO2:  [88 %-99 %] 95 %  BP: ()/(45-85) 128/72     Weight: 75.4 kg (166 lb 3.2 oz)  Body mass index is 25.27 kg/m².    Intake/Output Summary (Last 24 hours) at 07/11/17 0757  Last data filed at 07/11/17 0600   Gross per 24 hour   Intake           4612.5 ml   Output                0 ml   Net           4612.5 ml      Physical Exam   Constitutional: He is oriented to person, place, and time. No distress.   Weak and deconditioned.  Thin nut not cachectic.   HENT:   Head: Normocephalic and atraumatic.   Mouth/Throat: Oropharynx is clear and moist.   Eyes: Conjunctivae and EOM are normal. Pupils are equal, round, and reactive to light.   Neck: No JVD present. No thyromegaly present.   Cardiovascular: Normal heart sounds.  Exam reveals no gallop and no friction rub.    No murmur heard.  Tachycardia with an irregularly irregular rhythm   Pulmonary/Chest: Breath sounds  normal. He has no wheezes. He has no rales.   Shallow and rapid breathing.   Abdominal: Soft. Bowel sounds are normal. He exhibits no distension. There is no tenderness. There is no rebound and no guarding.   Musculoskeletal: Normal range of motion. He exhibits no edema or tenderness.   Lymphadenopathy:     He has no cervical adenopathy.   Neurological: He is alert and oriented to person, place, and time. He has normal reflexes. He displays normal reflexes. No cranial nerve deficit.   Skin: Skin is warm and dry. No rash noted. He is not diaphoretic. No erythema.   Psychiatric: He has a normal mood and affect. His behavior is normal. Judgment and thought content normal.       Significant Labs:   CBC:     Recent Labs  Lab 07/09/17 2304 07/10/17  0533 07/10/17  0816 07/11/17  0404   WBC 11.40 3.23*  --  10.29   HGB 8.3* 7.8* 8.6* 9.1*   HCT 24.8* 23.5* 25.8* 27.4*   PLT 30* 28*  --  33*     CMP:     Recent Labs  Lab 07/09/17  2304 07/11/17  0404    142   K 3.7 4.4    113*   CO2 21* 19*   * 152*   BUN 13 19   CREATININE 0.8 0.8   CALCIUM 8.6* 8.5*   PROT 6.4  --    ALBUMIN 2.9* 2.5*   BILITOT 0.4  --    ALKPHOS 61  --    AST 20  --    ALT 28  --    ANIONGAP 10 10   EGFRNONAA >60 >60       Significant Imaging: I have reviewed all pertinent imaging results/findings within the past 24 hours.    Assessment/Plan:      Elevated troponin    Probably due to heart strain from atrial fibrillation with RVR but may also be from MI.  Trend troponin and Cardiology to evaluate.        Atrial fibrillation with RVR    Change in rhythm day 2 of admission with rate up to 150s.  Probably a response to systemic illness related to fever with neutropenia and progression of AML.  Associated with modest increase in both Troponin and BNP and decreased blood pressure.  Start with Diltiazem IV 15 mg.  Trend troponin and consult Cardiology.  Discuss code status with the patient when his symptoms improve.        Fever    Possibly  chemo related vs neutropenic fever.  Blood counts are variable with significant amount of blasts.  No S/s of any sepsis or infection and no fevers the last 24 hours.  Continue Cefepime empirically at 2 g every 8 hours.        Thrombocytopenia    Chemo/AML related.  No signs of bleeding.  Will watch platelets and transfuse if Platelets < 10 or shows any bleeding.        Acute myeloid leukemia not having achieved remission    Pt was scheduled to start next chemo cycle this Monday 10 July for his third line of treatment of AML.  Oncology is deferring chemotherapy.  Prognosis is poor having failed two regimens.        * Symptomatic anemia    Secondary to effects of chemotherapy.  May need Venofer.  Currently further chemo is on hold.          VTE Risk Mitigation         Ordered     High Risk of VTE  Once      07/10/17 0120     Place sequential compression device  Until discontinued      07/10/17 0120     Reason for No Pharmacological VTE Prophylaxis  Once      07/10/17 0120          Joce Vivas MD  Department of Hospital Medicine   [facility]

## 2017-07-11 NOTE — SUBJECTIVE & OBJECTIVE
Interval History:  Two episodes of chest pain last night.  The first at 10 pm then again around 4 am with an irregular tachycardia.  He also endorses radiation of the pain to his arms.  Each episode lasted about an hour and he feels short of breath ow but the chest pain is gone.    Review of Systems   Constitutional: Negative for chills and fever.   HENT: Negative.  Negative for congestion and sore throat.    Eyes: Negative.  Negative for visual disturbance.   Respiratory: Positive for shortness of breath. Negative for cough and wheezing.    Cardiovascular: Positive for chest pain.   Gastrointestinal: Negative for abdominal pain, diarrhea, nausea and vomiting.   Endocrine: Negative.    Genitourinary: Negative.    Musculoskeletal: Negative.  Negative for myalgias and neck stiffness.   Skin: Negative.  Negative for color change and pallor.   Allergic/Immunologic: Negative.    Neurological: Positive for weakness.   Hematological: Negative.    Psychiatric/Behavioral: Negative.    All other systems reviewed and are negative.    Objective:     Vital Signs (Most Recent):  Temp: 97.7 °F (36.5 °C) (07/11/17 0302)  Pulse: (!) 137 (according to cardiac monitor.) (07/11/17 0748)  Resp: (!) 24 (07/11/17 0748)  BP: 128/72 (07/11/17 0745)  SpO2: 95 % (07/11/17 0748) Vital Signs (24h Range):  Temp:  [97.6 °F (36.4 °C)-98.1 °F (36.7 °C)] 97.7 °F (36.5 °C)  Pulse:  [] 137  Resp:  [18-46] 24  SpO2:  [88 %-99 %] 95 %  BP: ()/(45-85) 128/72     Weight: 75.4 kg (166 lb 3.2 oz)  Body mass index is 25.27 kg/m².    Intake/Output Summary (Last 24 hours) at 07/11/17 0757  Last data filed at 07/11/17 0600   Gross per 24 hour   Intake           4612.5 ml   Output                0 ml   Net           4612.5 ml      Physical Exam   Constitutional: He is oriented to person, place, and time. No distress.   Weak and deconditioned.  Thin nut not cachectic.   HENT:   Head: Normocephalic and atraumatic.   Mouth/Throat: Oropharynx is clear  and moist.   Eyes: Conjunctivae and EOM are normal. Pupils are equal, round, and reactive to light.   Neck: No JVD present. No thyromegaly present.   Cardiovascular: Normal heart sounds.  Exam reveals no gallop and no friction rub.    No murmur heard.  Tachycardia with an irregularly irregular rhythm   Pulmonary/Chest: Breath sounds normal. He has no wheezes. He has no rales.   Shallow and rapid breathing.   Abdominal: Soft. Bowel sounds are normal. He exhibits no distension. There is no tenderness. There is no rebound and no guarding.   Musculoskeletal: Normal range of motion. He exhibits no edema or tenderness.   Lymphadenopathy:     He has no cervical adenopathy.   Neurological: He is alert and oriented to person, place, and time. He has normal reflexes. He displays normal reflexes. No cranial nerve deficit.   Skin: Skin is warm and dry. No rash noted. He is not diaphoretic. No erythema.   Psychiatric: He has a normal mood and affect. His behavior is normal. Judgment and thought content normal.       Significant Labs:   CBC:     Recent Labs  Lab 07/09/17  2304 07/10/17  0533 07/10/17  0816 07/11/17  0404   WBC 11.40 3.23*  --  10.29   HGB 8.3* 7.8* 8.6* 9.1*   HCT 24.8* 23.5* 25.8* 27.4*   PLT 30* 28*  --  33*     CMP:     Recent Labs  Lab 07/09/17  2304 07/11/17  0404    142   K 3.7 4.4    113*   CO2 21* 19*   * 152*   BUN 13 19   CREATININE 0.8 0.8   CALCIUM 8.6* 8.5*   PROT 6.4  --    ALBUMIN 2.9* 2.5*   BILITOT 0.4  --    ALKPHOS 61  --    AST 20  --    ALT 28  --    ANIONGAP 10 10   EGFRNONAA >60 >60       Significant Imaging: I have reviewed all pertinent imaging results/findings within the past 24 hours.

## 2017-07-11 NOTE — ASSESSMENT & PLAN NOTE
Pt was scheduled to start next chemo cycle this Monday 10 July for his third line of treatment of AML.  Oncology is deferring chemotherapy.  Prognosis is poor having failed two regimens.

## 2017-07-11 NOTE — CONSULTS
Consult Note  Cardiology    Consult Requested By: Dr Vivas  Reason for Consult: New onset A Fib with RVR, elevated troponin    SUBJECTIVE:     History of Present Illness:  Mr Jordan is a 62 y.o. male presents with PMHx of AML, thrombocytopenia, pancytopenia presented to [facility] with shortness of breath. Associated symptoms include fever and coughing. Denies associated symptoms of chest pain, palpitations, syncope or dizziness. Troponin elevated at 0.102, 0.101, 0.107>0.210. . He has been undergoing chemotherapy. H & H 7.8/23.5 on yesterday. EKG: A Fib with RVR rate 148, marked ST abnormality. He continues to experience shortness of breath.     Review of patient's allergies indicates:  No Known Allergies    Past Medical History:   Diagnosis Date    AML (acute myeloblastic leukemia) 11/04/2016    non -M3     Encounter for blood transfusion     Hypertension     Stroke     R side weakness     Past Surgical History:   Procedure Laterality Date    partial amputation middle finger Right      Family History   Problem Relation Age of Onset    Diabetes Father      Social History   Substance Use Topics    Smoking status: Current Every Day Smoker     Packs/day: 0.50     Years: 42.00     Types: Cigarettes     Last attempt to quit: 11/3/2016    Smokeless tobacco: Never Used      Comment: no smoking after midnight prior to surgery    Alcohol use No        Review of Systems:  Constitutional: positive for fatigue and weakness, negative for fever or chills   Eyes: negative  Ears, nose, mouth, throat, and face: negative  Respiratory: positive for shortness of breath, orthopnea or PND  Cardiovascular: negative for chest pain, negative palpitations, syncope or near syncope  Gastrointestinal: negative for nausea or vomiting  Genitourinary:negative  Hematologic/lymphatic: negative  Musculoskeletal:negative,   Neurological: negative  Behavioral/Psych: negative  Endocrine: negative  Allergic/Immunologic:  negative    OBJECTIVE:     Vital Signs (Most Recent)  Temp: 97.7 °F (36.5 °C) (07/11/17 0302)  Pulse: 92 (07/11/17 1100)  Resp: (!) 28 (07/11/17 1100)  BP: 106/67 (07/11/17 1100)  SpO2: 100 % (07/11/17 1100)    Vital Signs Range (Last 24H):  Temp:  [97.6 °F (36.4 °C)-98.1 °F (36.7 °C)]   Pulse:  []   Resp:  [18-46]   BP: ()/(45-85)   SpO2:  [88 %-100 %]     Current Facility-Administered Medications   Medication    0.9%  NaCl infusion (for blood administration)    albuterol-ipratropium 2.5mg-0.5mg/3mL nebulizer solution 3 mL    aspirin EC tablet 81 mg    calcium carbonate 200 mg calcium (500 mg) chewable tablet 500 mg    ceFEPIme in dextrose 5% 2 gram/50 mL IVPB 2 g    furosemide injection 20 mg    metoprolol succinate (TOPROL-XL) 24 hr tablet 25 mg    nitroGLYCERIN SL tablet 0.4 mg    ramelteon tablet 8 mg     No current facility-administered medications on file prior to encounter.      Current Outpatient Prescriptions on File Prior to Encounter   Medication Sig    aspirin 81 MG Chew Take 81 mg by mouth once daily.    hydrocodone-acetaminophen 5-325mg (NORCO) 5-325 mg per tablet Take 1 tablet by mouth every 6 (six) hours as needed for Pain.    ondansetron (ZOFRAN) 8 MG tablet Take 1 tablet (8 mg total) by mouth every 12 (twelve) hours as needed for Nausea.    valacyclovir (VALTREX) 500 MG tablet Take 1 tablet (500 mg total) by mouth once daily. (Patient taking differently: Take 500 mg by mouth daily as needed. )       Physical Exam:  General appearance: alert, appears stated age and cooperative  Head: Normocephalic, without obvious abnormality, atraumatic  Eyes:  conjunctivae/corneas clear. PERRL, EOM's intact. Fundi benign.  Nose: no discharge  Throat: normal findings: tongue midline and normal  Neck: no adenopathy, no carotid bruit, no JVD, supple, symmetrical, trachea midline and thyroid not enlarged, symmetric, no tenderness/mass/nodules  Back:  no skin lesions, erythema, or  scars  Lungs:  BBS rales to posterior bases   Chest wall: no tenderness  Heart: regular rate and rhythm, S1, S2 normal, no murmur, click, rub or gallop  Abdomen: soft, non-tender; bowel sounds normal; no masses,  no organomegaly  Extremities: extremities normal, atraumatic, no cyanosis or edema  Pulses: 1+ and symmetric  Skin: Skin color, texture, turgor normal. No rashes or lesions  Neurologic: Grossly normal    Laboratory:  Chemistry:   Lab Results   Component Value Date     07/11/2017    K 4.4 07/11/2017     (H) 07/11/2017    CO2 19 (L) 07/11/2017    BUN 19 07/11/2017    CREATININE 0.8 07/11/2017    CALCIUM 8.5 (L) 07/11/2017     Cardiac Markers:   Lab Results   Component Value Date    TROPONINI 0.210 (H) 07/11/2017     Cardiac Markers (Last 3):   Lab Results   Component Value Date    TROPONINI 0.210 (H) 07/11/2017    TROPONINI 0.107 (H) 07/10/2017    TROPONINI 0.101 (H) 07/10/2017     CBC:   Lab Results   Component Value Date    WBC 10.29 07/11/2017    HGB 9.1 (L) 07/11/2017    HCT 27.4 (L) 07/11/2017     (H) 07/11/2017    PLT 33 (LL) 07/11/2017     Lipids:   Lab Results   Component Value Date    CHOL 189 10/29/2016    TRIG 90 10/29/2016    HDL 28 (L) 10/29/2016     Coagulation:   Lab Results   Component Value Date    INR 1.1 07/09/2017    APTT 32.0 07/09/2017       Diagnostic Results:  ECG: Reviewed  X-Ray: Reviewed  US: Reviewed  CT: Reviewed  Echo: Reviewed      ASSESSMENT/PLAN:     Patient Active Problem List   Diagnosis    Paroxysmal atrial fibrillation    AML (acute myeloblastic leukemia)    Pancytopenia due to antineoplastic chemotherapy    Anemia due to chemotherapy    Acute myeloid leukemia not having achieved remission    Thrombocytopenia    Atrial fibrillation, controlled    CT Compatible and MRI Safe Powerport in place    Symptomatic anemia    Fever    Atrial fibrillation with RVR    Elevated troponin    Acute on chronic diastolic congestive heart failure      Patients  experiencing new onset PAF with RVR. He is not a candidate for anticoagulation due to severe pancytopenia. Elevated troponin due ischemic demand anemia, A Fib with RVR and Acute on Chronic Diastolic CHF.     Plan:     Will continue current medications and treatment plan  Risk modification   Stop IVF   Lasix 20 mg IV daily   Start ASA 81 mg daily   Start Toprol XL 25 mg daily   Check 2 D Echo     Chart reviewed. Dr. Dos Santos agrees with plan as outlined above.

## 2017-07-11 NOTE — PROGRESS NOTES
[facility] Hospital Medicine  Progress Note    Patient Name: Carlos Jordan Jr.  MRN: [id number]  Patient Class: IP- Inpatient   Admission Date: 7/9/2017  Length of Stay: 0 days  Attending Physician: Joce Vivas MD  Primary Care Provider: Primary Doctor No        Subjective:     Principal Problem:Symptomatic anemia    HPI:  Chief Complaint   Patient presents with    Shortness of Breath    Fever         Review of patient's allergies indicates:  No Known Allergies      History obtained from the patient                            History of Present Illness: Carlos Jordan Jr. is a 62 y.o. male patient, with hx of COPD, AML on chemo, present s to the ER for SOB which started gradually today PTA. Pt reports that he is scheduled to receive chemo tomorrow and that last dosage received was over a month ago.  Associated sxs include fever (tmax 100.3) and dry cough. Patient denies any recent travel, long car trips, leg pain/swelling, CP, n/v, and all other sxs at this time. Pt denies taking any breathing tx at home. No further complaints or concerns at this time.     In the ER, pts labs showed a drop in the H/H to 8.3/25 with Platelets 30K. Oncologist Dr. Hudson recommended admit with IVF and giving him a unit of blood and empirically start Cefipime. Pt received a L if NS and is feeling a little better.     Hospital Course:  No notes on file    Interval History:  Weakness and fatigue    Review of Systems   Constitutional: Negative for chills and fever.   HENT: Negative.  Negative for congestion and sore throat.    Eyes: Negative.  Negative for visual disturbance.   Respiratory: Positive for shortness of breath. Negative for cough and wheezing.    Cardiovascular: Negative.  Negative for chest pain.   Gastrointestinal: Negative for abdominal pain, diarrhea, nausea and vomiting.   Endocrine: Negative.    Genitourinary: Negative.    Musculoskeletal: Negative.  Negative for myalgias and neck stiffness.    Skin: Negative.  Negative for color change and pallor.   Allergic/Immunologic: Negative.    Neurological: Positive for weakness.   Hematological: Negative.    Psychiatric/Behavioral: Negative.    All other systems reviewed and are negative.    Objective:     Vital Signs (Most Recent):  Temp: 98.1 °F (36.7 °C) (07/10/17 2020)  Pulse: 81 (07/10/17 2020)  Resp: 20 (07/10/17 2020)  BP: 112/72 (07/10/17 2020)  SpO2: 95 % (07/10/17 2020) Vital Signs (24h Range):  Temp:  [97.7 °F (36.5 °C)-100.1 °F (37.8 °C)] 98.1 °F (36.7 °C)  Pulse:  [] 81  Resp:  [17-24] 20  SpO2:  [95 %-100 %] 95 %  BP: (100-134)/(54-72) 112/72     Weight: 72.6 kg (160 lb 0.9 oz)  Body mass index is 24.34 kg/m².    Intake/Output Summary (Last 24 hours) at 07/10/17 2117  Last data filed at 07/10/17 1800   Gross per 24 hour   Intake          2704.17 ml   Output              500 ml   Net          2204.17 ml      Physical Exam   Constitutional: He is oriented to person, place, and time. He appears well-developed and well-nourished. No distress.   HENT:   Head: Normocephalic and atraumatic.   Mouth/Throat: Oropharynx is clear and moist.   Eyes: Conjunctivae and EOM are normal. Pupils are equal, round, and reactive to light.   Neck: No JVD present. No thyromegaly present.   Cardiovascular: Normal rate, regular rhythm and normal heart sounds.  Exam reveals no gallop and no friction rub.    No murmur heard.  Pulmonary/Chest: Effort normal and breath sounds normal. No respiratory distress. He has no wheezes. He has no rales.   Abdominal: Soft. Bowel sounds are normal. He exhibits no distension. There is no tenderness. There is no rebound and no guarding.   Musculoskeletal: Normal range of motion. He exhibits no edema or tenderness.   Lymphadenopathy:     He has no cervical adenopathy.   Neurological: He is alert and oriented to person, place, and time. He has normal reflexes. He displays normal reflexes. No cranial nerve deficit.   Skin: Skin is warm  and dry. No rash noted. He is not diaphoretic. No erythema.   Psychiatric: He has a normal mood and affect. His behavior is normal. Judgment and thought content normal.       Significant Labs:   CBC:   Recent Labs  Lab 07/09/17  2304 07/10/17  0533 07/10/17  0816   WBC 11.40 3.23*  --    HGB 8.3* 7.8* 8.6*   HCT 24.8* 23.5* 25.8*   PLT 30* 28*  --      CMP:   Recent Labs  Lab 07/09/17  2304      K 3.7      CO2 21*   *   BUN 13   CREATININE 0.8   CALCIUM 8.6*   PROT 6.4   ALBUMIN 2.9*   BILITOT 0.4   ALKPHOS 61   AST 20   ALT 28   ANIONGAP 10   EGFRNONAA >60       Significant Imaging: I have reviewed all pertinent imaging results/findings within the past 24 hours.    Assessment/Plan:      Fever    Possibly chemo related  No S/s of any sepsis or infection  Start Cefepime empirically  Calculated ANC was 190 today.        Thrombocytopenia    Chemo related.  No active bleeding going on.  Will watch platelets and transfuse if Platelets < 10 or shows any bleeding          Acute myeloid leukemia not having achieved remission    Pt was scheduled to start next chemo cycle tomorrow but may defer it for a few days          * Symptomatic anemia    Sec to Chemo-- no CHF or Pneumonia  Getting a unit of blood  May need Venofer  Watch closely, may defer chemo for a few days            VTE Risk Mitigation         Ordered     High Risk of VTE  Once      07/10/17 0120     Place sequential compression device  Until discontinued      07/10/17 0120     Reason for No Pharmacological VTE Prophylaxis  Once      07/10/17 0120          Joce Vivas MD  Department of Hospital Medicine   [facility]

## 2017-07-11 NOTE — ASSESSMENT & PLAN NOTE
Possibly chemo related vs neutropenic fever.  Blood counts are variable with significant amount of blasts.  No S/s of any sepsis or infection and no fevers the last 24 hours.  Continue Cefepime empirically at 2 g every 8 hours.

## 2017-07-11 NOTE — PLAN OF CARE
Problem: Patient Care Overview  Goal: Individualization & Mutuality  Outcome: Ongoing (interventions implemented as appropriate)  Pt AAO x 3 pt SR on tele monitor.   Chemo infusion on hold until all cultures are processed if the patient is medically stable.  Afebrile this shift.   H&H improvement with 1 unit RBC transfused this AM.   AM labs in place.   02 at 3 LPM titrated down from 5 LPM this shift.   saturating well.

## 2017-07-11 NOTE — ASSESSMENT & PLAN NOTE
Possibly chemo related  No S/s of any sepsis or infection  Start Cefepime empirically  Calculated ANC was 190 today.

## 2017-07-11 NOTE — HOSPITAL COURSE
Patient admitted for possible septic syndrome. Patient has a history of acute myelogenous leukemia and is currently being treated with chemo, last chemo was a month ago and is scheduled for his third line of treatment. In the ER, pts labs showed a drop in the H/H to 8.3/25 with Platelets 30K. Oncology consulted.  Oncologist Dr. Hudson recommended admit with IVF and giving him a unit of blood and empirically start Cefipime. In the ER, Pt received a 1L NS and is feeling a little better. During hospital stay patient had two episodes of chest pain overnight and rhythm change to Afib with RVR. He was given Lopressor 5 mg IV and loaded with Digoxin  And converted  Back to sinus rhythm. Cardiology consulted. Troponin elevated with an elevation to BNP. 2 D Echo shows moderately depressed Left ventriculare systolic function or 30-35 %. No fevers. Daily blood counts with significant amount of blasts. Oncology electing not to treat given his third recurrence. He is no longer a candidate for additional chemotherapy but could benefit symptomatically from management of his heart failure. Will continue current medications Digoxin, Metoprolol, Lasix, and ASA. He elected to change his code status to DNR and discussed options for hospice. Patient agreed with hospice at home.  consulted for discharge planning (Home Hospice). Metoprolol dose decreased to 50 mg daily due to hypotension. Patient seen and examined on date of discharge and found suitable for discharge.

## 2017-07-11 NOTE — ASSESSMENT & PLAN NOTE
Change in rhythm day 2 of admission with rate up to 150s.  Probably a response to systemic illness related to fever with neutropenia and progression of AML.  Associated with modest increase in both Troponin and BNP and decreased blood pressure.  Start with Diltiazem IV 15 mg.  Trend troponin and consult Cardiology.  Discuss code status with the patient when his symptoms improve.

## 2017-07-11 NOTE — PLAN OF CARE
Problem: Patient Care Overview  Goal: Plan of Care Review  Outcome: Ongoing (interventions implemented as appropriate)  Pt remained free of injury during shift, stable condition, pain adequately controlled, no acute distress, receiving IV fluids, receiving antibiotics, on cardiac monitoring had two episodes of tachycardia, had complained of SOB/Chest pain and cardiac enzymes drawn/EKG performed, respiratory therapy treatment given by respiratory therapist with Pt reporting improvement in SOB, and will continue to monitor. 24hr chart review performed.

## 2017-07-11 NOTE — SUBJECTIVE & OBJECTIVE
Interval History: When I went to the floor this morning spoke to hospital medicine patient is in atrial fibrillation with rapid ventricular  response    Oncology Treatment Plan:   OP DECITABINE Q4W    Medications:  Continuous Infusions:   sodium chloride 0.9% 125 mL/hr at 07/10/17 2351     Scheduled Meds:   ceFEPime (MAXIPIME) IVPB  2 g Intravenous Q8H    diltiaZEM  15 mg Intravenous Once     PRN Meds:sodium chloride, albuterol-ipratropium 2.5mg-0.5mg/3mL, calcium carbonate, nitroGLYCERIN, ramelteon     Review of Systems   Constitutional: Positive for activity change and fatigue. Negative for appetite change, chills, diaphoresis, fever and unexpected weight change.   HENT: Negative for congestion, dental problem, drooling, ear discharge, ear pain, facial swelling, hearing loss, mouth sores, nosebleeds, postnasal drip, rhinorrhea, sinus pressure, sneezing, sore throat, tinnitus, trouble swallowing and voice change.    Eyes: Negative for photophobia, pain, discharge, redness, itching and visual disturbance.   Respiratory: Positive for shortness of breath. Negative for apnea, cough, choking, chest tightness, wheezing and stridor.    Cardiovascular: Positive for palpitations. Negative for chest pain and leg swelling.   Gastrointestinal: Negative for abdominal distention, abdominal pain, anal bleeding, blood in stool, constipation, diarrhea, nausea, rectal pain and vomiting.   Endocrine: Negative for cold intolerance, heat intolerance, polydipsia, polyphagia and polyuria.   Genitourinary: Negative for decreased urine volume, difficulty urinating, discharge, dysuria, enuresis, flank pain, frequency, genital sores, hematuria, penile pain, penile swelling, scrotal swelling, testicular pain and urgency.   Musculoskeletal: Negative for arthralgias, back pain, gait problem, joint swelling, myalgias, neck pain and neck stiffness.   Skin: Negative for color change, pallor, rash and wound.   Allergic/Immunologic: Negative for  environmental allergies, food allergies and immunocompromised state.   Neurological: Positive for weakness. Negative for dizziness, tremors, seizures, syncope, facial asymmetry, speech difficulty, light-headedness, numbness and headaches.   Hematological: Negative for adenopathy. Does not bruise/bleed easily.   Psychiatric/Behavioral: Negative for agitation, behavioral problems, confusion, decreased concentration, dysphoric mood, hallucinations, self-injury, sleep disturbance and suicidal ideas. The patient is not nervous/anxious and is not hyperactive.      Objective:     Vital Signs (Most Recent):  Temp: 97.7 °F (36.5 °C) (07/11/17 0302)  Pulse: (!) 137 (according to cardiac monitor.) (07/11/17 0748)  Resp: (!) 24 (07/11/17 0748)  BP: 128/72 (07/11/17 0745)  SpO2: 95 % (07/11/17 0748) Vital Signs (24h Range):  Temp:  [97.6 °F (36.4 °C)-98.1 °F (36.7 °C)] 97.7 °F (36.5 °C)  Pulse:  [] 137  Resp:  [18-46] 24  SpO2:  [88 %-99 %] 95 %  BP: ()/(45-85) 128/72     Weight: 75.4 kg (166 lb 3.2 oz)  Body mass index is 25.27 kg/m².  Body surface area is 1.9 meters squared.      Intake/Output Summary (Last 24 hours) at 07/11/17 0838  Last data filed at 07/11/17 0600   Gross per 24 hour   Intake           4612.5 ml   Output                0 ml   Net           4612.5 ml       Physical Exam   Constitutional: He is oriented to person, place, and time. He appears well-developed and well-nourished. He appears cachectic. He has a sickly appearance. He appears ill. He appears distressed.   HENT:   Head: Normocephalic.   Right Ear: External ear normal.   Left Ear: External ear normal.   Nose: Nose normal. Right sinus exhibits no maxillary sinus tenderness and no frontal sinus tenderness. Left sinus exhibits no maxillary sinus tenderness and no frontal sinus tenderness.   Mouth/Throat: Oropharynx is clear and moist. No oropharyngeal exudate.   Eyes: EOM and lids are normal. Pupils are equal, round, and reactive to light.  Right eye exhibits no discharge. Left eye exhibits no discharge. Right conjunctiva is not injected. Right conjunctiva has no hemorrhage. Left conjunctiva is not injected. Left conjunctiva has no hemorrhage. No scleral icterus. Right eye exhibits normal extraocular motion. Left eye exhibits normal extraocular motion.   Neck: Normal range of motion. Neck supple. No JVD present. No tracheal deviation present. No thyromegaly present.   Cardiovascular: Normal rate, regular rhythm and normal heart sounds.    Atrial fibrillation with rapid ventricular response   Pulmonary/Chest: Breath sounds normal. No stridor. He is in respiratory distress.   Abdominal: Soft. Bowel sounds are normal. He exhibits no mass. There is no hepatosplenomegaly, splenomegaly or hepatomegaly. There is no tenderness.   Musculoskeletal: Normal range of motion. He exhibits no edema or tenderness.   Lymphadenopathy:        Head (right side): No posterior auricular and no occipital adenopathy present.        Head (left side): No posterior auricular and no occipital adenopathy present.     He has no cervical adenopathy.        Right cervical: No superficial cervical, no deep cervical and no posterior cervical adenopathy present.       Left cervical: No superficial cervical, no deep cervical and no posterior cervical adenopathy present.     He has no axillary adenopathy.        Right: No supraclavicular adenopathy present.        Left: No supraclavicular adenopathy present.   Neurological: He is alert and oriented to person, place, and time. He has normal strength. No cranial nerve deficit. Coordination normal.   Skin: Skin is dry. No rash noted. He is not diaphoretic. No cyanosis or erythema. Nails show no clubbing.   Psychiatric: His behavior is normal. Judgment and thought content normal. His mood appears anxious. Cognition and memory are normal. He exhibits a depressed mood.   Vitals reviewed.      Significant Labs:   BMP:   Recent Labs  Lab  07/09/17 2304 07/11/17  0404   * 152*    142   K 3.7 4.4    113*   CO2 21* 19*   BUN 13 19   CREATININE 0.8 0.8   CALCIUM 8.6* 8.5*   MG 1.9  --    , CBC:   Recent Labs  Lab 07/09/17  2304 07/10/17  0533 07/10/17  0816 07/11/17  0404   WBC 11.40 3.23*  --  10.29   HGB 8.3* 7.8* 8.6* 9.1*   HCT 24.8* 23.5* 25.8* 27.4*   PLT 30* 28*  --  33*    and CMP:   Recent Labs  Lab 07/09/17 2304 07/11/17  0404    142   K 3.7 4.4    113*   CO2 21* 19*   * 152*   BUN 13 19   CREATININE 0.8 0.8   CALCIUM 8.6* 8.5*   PROT 6.4  --    ALBUMIN 2.9* 2.5*   BILITOT 0.4  --    ALKPHOS 61  --    AST 20  --    ALT 28  --    ANIONGAP 10 10   EGFRNONAA >60 >60       Diagnostic Results:  I have reviewed and interpreted all pertinent imaging results/findings within the past 24 hours.

## 2017-07-11 NOTE — NURSING
Pt Troponin lab (at 404) elevated at .210 from previous value, Dr. Viera contacted. Dr. Viera ordered a consult to cardiology be conducted, telephone with readback.

## 2017-07-11 NOTE — NURSING
Pt stated that chest pain has resolved. Contacted Dr. Viera and he ordered breathing treatments to be given Q4 PRN and to draw a BNP.

## 2017-07-12 LAB
ALBUMIN SERPL BCP-MCNC: 2.4 G/DL
ALBUMIN SERPL BCP-MCNC: 2.4 G/DL
ALP SERPL-CCNC: 54 U/L
ALT SERPL W/O P-5'-P-CCNC: 46 U/L
ANION GAP SERPL CALC-SCNC: 9 MMOL/L
ANION GAP SERPL CALC-SCNC: 9 MMOL/L
AST SERPL-CCNC: 20 U/L
BASOPHILS NFR BLD: 0 %
BILIRUB SERPL-MCNC: 0.6 MG/DL
BLASTS NFR BLD MANUAL: 64 %
BNP SERPL-MCNC: 678 PG/ML
BUN SERPL-MCNC: 11 MG/DL
BUN SERPL-MCNC: 11 MG/DL
CALCIUM SERPL-MCNC: 8.3 MG/DL
CALCIUM SERPL-MCNC: 8.3 MG/DL
CHLORIDE SERPL-SCNC: 111 MMOL/L
CHLORIDE SERPL-SCNC: 111 MMOL/L
CO2 SERPL-SCNC: 21 MMOL/L
CO2 SERPL-SCNC: 21 MMOL/L
CREAT SERPL-MCNC: 0.7 MG/DL
CREAT SERPL-MCNC: 0.7 MG/DL
DIFFERENTIAL METHOD: ABNORMAL
EOSINOPHIL NFR BLD: 0 %
ERYTHROCYTE [DISTWIDTH] IN BLOOD BY AUTOMATED COUNT: 19.9 %
EST. GFR  (AFRICAN AMERICAN): >60 ML/MIN/1.73 M^2
EST. GFR  (AFRICAN AMERICAN): >60 ML/MIN/1.73 M^2
EST. GFR  (NON AFRICAN AMERICAN): >60 ML/MIN/1.73 M^2
EST. GFR  (NON AFRICAN AMERICAN): >60 ML/MIN/1.73 M^2
GLUCOSE SERPL-MCNC: 135 MG/DL
GLUCOSE SERPL-MCNC: 135 MG/DL
HCT VFR BLD AUTO: 24.7 %
HGB BLD-MCNC: 8.2 G/DL
LYMPHOCYTES NFR BLD: 22 %
MCH RBC QN AUTO: 34.7 PG
MCHC RBC AUTO-ENTMCNC: 33.2 %
MCV RBC AUTO: 105 FL
METAMYELOCYTES NFR BLD MANUAL: 1 %
MONOCYTES NFR BLD: 10 %
NEUTROPHILS NFR BLD: 3 %
PHOSPHATE SERPL-MCNC: 2.8 MG/DL
PLATELET # BLD AUTO: 27 K/UL
PLATELET BLD QL SMEAR: ABNORMAL
PMV BLD AUTO: 9.8 FL
POTASSIUM SERPL-SCNC: 3.7 MMOL/L
POTASSIUM SERPL-SCNC: 3.7 MMOL/L
PROT SERPL-MCNC: 5.5 G/DL
RBC # BLD AUTO: 2.36 M/UL
SODIUM SERPL-SCNC: 141 MMOL/L
SODIUM SERPL-SCNC: 141 MMOL/L
WBC # BLD AUTO: 10.53 K/UL

## 2017-07-12 PROCEDURE — 85027 COMPLETE CBC AUTOMATED: CPT

## 2017-07-12 PROCEDURE — 21400001 HC TELEMETRY ROOM

## 2017-07-12 PROCEDURE — 99233 SBSQ HOSP IP/OBS HIGH 50: CPT | Mod: ,,, | Performed by: INTERNAL MEDICINE

## 2017-07-12 PROCEDURE — 63600175 PHARM REV CODE 636 W HCPCS: Performed by: INTERNAL MEDICINE

## 2017-07-12 PROCEDURE — 80069 RENAL FUNCTION PANEL: CPT

## 2017-07-12 PROCEDURE — 85007 BL SMEAR W/DIFF WBC COUNT: CPT

## 2017-07-12 PROCEDURE — 36415 COLL VENOUS BLD VENIPUNCTURE: CPT

## 2017-07-12 PROCEDURE — 25000003 PHARM REV CODE 250: Performed by: NURSE PRACTITIONER

## 2017-07-12 PROCEDURE — 25000003 PHARM REV CODE 250: Performed by: INTERNAL MEDICINE

## 2017-07-12 PROCEDURE — 83880 ASSAY OF NATRIURETIC PEPTIDE: CPT

## 2017-07-12 PROCEDURE — 63600175 PHARM REV CODE 636 W HCPCS: Performed by: EMERGENCY MEDICINE

## 2017-07-12 PROCEDURE — 63600175 PHARM REV CODE 636 W HCPCS: Performed by: NURSE PRACTITIONER

## 2017-07-12 PROCEDURE — 80053 COMPREHEN METABOLIC PANEL: CPT

## 2017-07-12 RX ORDER — FUROSEMIDE 10 MG/ML
20 INJECTION INTRAMUSCULAR; INTRAVENOUS 2 TIMES DAILY
Status: DISCONTINUED | OUTPATIENT
Start: 2017-07-12 | End: 2017-07-13 | Stop reason: HOSPADM

## 2017-07-12 RX ORDER — METOPROLOL TARTRATE 1 MG/ML
5 INJECTION, SOLUTION INTRAVENOUS ONCE
Status: COMPLETED | OUTPATIENT
Start: 2017-07-12 | End: 2017-07-12

## 2017-07-12 RX ORDER — DIGOXIN 0.25 MG/ML
250 INJECTION INTRAMUSCULAR; INTRAVENOUS ONCE
Status: COMPLETED | OUTPATIENT
Start: 2017-07-12 | End: 2017-07-12

## 2017-07-12 RX ORDER — METOPROLOL SUCCINATE 50 MG/1
50 TABLET, EXTENDED RELEASE ORAL 2 TIMES DAILY
Status: DISCONTINUED | OUTPATIENT
Start: 2017-07-12 | End: 2017-07-13 | Stop reason: HOSPADM

## 2017-07-12 RX ORDER — DIGOXIN 125 MCG
0.12 TABLET ORAL DAILY
Status: DISCONTINUED | OUTPATIENT
Start: 2017-07-13 | End: 2017-07-13 | Stop reason: HOSPADM

## 2017-07-12 RX ORDER — DIGOXIN 0.25 MG/ML
500 INJECTION INTRAMUSCULAR; INTRAVENOUS ONCE
Status: COMPLETED | OUTPATIENT
Start: 2017-07-12 | End: 2017-07-12

## 2017-07-12 RX ADMIN — FUROSEMIDE 20 MG: 10 INJECTION, SOLUTION INTRAMUSCULAR; INTRAVENOUS at 05:07

## 2017-07-12 RX ADMIN — FUROSEMIDE 20 MG: 10 INJECTION, SOLUTION INTRAMUSCULAR; INTRAVENOUS at 08:07

## 2017-07-12 RX ADMIN — DIGOXIN 500 MCG: 0.25 INJECTION INTRAMUSCULAR; INTRAVENOUS at 10:07

## 2017-07-12 RX ADMIN — DIGOXIN 250 MCG: 0.25 INJECTION INTRAMUSCULAR; INTRAVENOUS at 05:07

## 2017-07-12 RX ADMIN — CEFEPIME HYDROCHLORIDE 2 G: 2 INJECTION, SOLUTION INTRAVENOUS at 01:07

## 2017-07-12 RX ADMIN — ASPIRIN 81 MG: 81 TABLET, COATED ORAL at 08:07

## 2017-07-12 RX ADMIN — METOPROLOL SUCCINATE 50 MG: 50 TABLET, EXTENDED RELEASE ORAL at 08:07

## 2017-07-12 RX ADMIN — METOPROLOL TARTRATE 5 MG: 5 INJECTION INTRAVENOUS at 10:07

## 2017-07-12 RX ADMIN — CEFEPIME HYDROCHLORIDE 2 G: 2 INJECTION, SOLUTION INTRAVENOUS at 08:07

## 2017-07-12 RX ADMIN — CEFEPIME HYDROCHLORIDE 2 G: 2 INJECTION, SOLUTION INTRAVENOUS at 06:07

## 2017-07-12 NOTE — PLAN OF CARE
Problem: Fall Risk (Adult)  Goal: Absence of Falls  Patient will demonstrate the desired outcomes by discharge/transition of care.   Outcome: Ongoing (interventions implemented as appropriate)  Pt remained free of falls today, compliant with calling for assistance before getting out of bed.    Problem: Patient Care Overview  Goal: Plan of Care Review  1st dose of cefepime, medication reviewed with patient, no sign/symptoms of adverse reaction.  Pt did not eat Breakfast or lunch today do to SOB.  Pt weaned off Venti mask at this time, tolerating 2L NC, pulse ox above 92%, pt sitting up trying to eat dinner.

## 2017-07-12 NOTE — PROGRESS NOTES
[facility] -   Hematology/Oncology  Progress Note    Patient Name: Carlos Jordan Jr.  Admission Date: 7/9/2017  Hospital Length of Stay: 2 days  Code Status: DNR     Subjective:     HPI:  62-year-old male who was scheduled to start today third line treatment for relapsed refractory acute myelogenous leukemia with Dacogen presented to the emergency room with fever and weakness was asked by ER physician whether or not to admit and reported non-neutropenic state.  Of told that I would admit to the hospital with blood cultures to make sure patient is not septic before consideration of further chemotherapy.    No new subjective & objective note has been filed under this hospital service since the last note was generated.    Assessment/Plan:     * Acute myeloid leukemia not having achieved remission    The patient presents with a possible septic syndrome.  Prior to initiation of any further chemotherapy will make sure that the patient is medically stable before initiation of treatment will discuss with primary oncologist 1 situation is clarified.  Patient and rapid ventricular response major fibrillation medically not stable at this point will need to discuss when patient is medical stable as to whether or not to proceed with treatment for third line treatment for acute myelogenous leukemia with primary oncologist.  7/12/17 the patient's condition continues to deteriorate for general medical standpoint with recurrent episodes of atrial fibrillation with aberrant rapid ventricular response the patient's ECOG status is 3I do not feel that treatment warranted at present time talked to patient about CODE STATUS he is agreed to no code we happy to talk to family members as well as staff as I discussed this morning about long-term prognosis.  No treatment during this hospitalization would need to be reassessed by primary oncologist in outpatient setting            Thank you for your consult. I will follow-up with  patient. Please contact us if you have any additional questions.     Brown Hudson MD  Hematology/Oncology  [facility] [place]

## 2017-07-12 NOTE — PROGRESS NOTES
Dr. Vivas notified that patients appears to be in AFIB with RVR per monitor with a rate from the 140's to 170's; pt denies cp and continues to be dyspneic with minimal exertion See orders

## 2017-07-12 NOTE — PLAN OF CARE
"Met with patient. Discussed health status. Patient verbalized getting "bad" news. Patient verbalized agreement to hospice at home with no preference for agency.      07/12/17 1022   Discharge Reassessment   Assessment Type Discharge Planning Reassessment   Can the patient answer the patient profile reliably? Yes, cognitively intact   How does the patient rate their overall health at the present time? Poor   Describe the patient's ability to walk at the present time. Minor restrictions or changes   Number of comorbid conditions (as recorded on the chart) Three   During the past month, has the patient often been bothered by feeling down, depressed or hopeless? No   During the past month, has the patient often been bothered by little interest or pleasure in doing things? No   Discharge plan remains the same: No   Provided patient/caregiver education on the expected discharge date and the discharge plan Yes   Discharge Plan A Hospice/home   Change in patient condition or support system No   Patient choice form signed by patient/caregiver Yes   Explained to the the patient/caregiver why the discharge planned changed: Yes   Involved the patient/caregiver in establishing a new discharge plan: Yes   Phoned [facility].   "

## 2017-07-12 NOTE — PLAN OF CARE
Problem: Patient Care Overview  Goal: Plan of Care Review  Outcome: Ongoing (interventions implemented as appropriate)  Patient updated on POC, demonstrated teach back  Pain denied  Monitor sr-st with freq. pac  VSS   Patient turns self  Fall precautions in place,bed locked,lowest position;call bell within reach;skidproof socks  Bed alarm  Pt remained free from falls: resp. Distress noted with minimal activity;educated patient on how to conserve energy

## 2017-07-12 NOTE — PROGRESS NOTES
Cardiology Progress Note        SUBJECTIVE:     History of Present Illness:  Patient is a 62 y.o. male presents with A Fib, Shortness of breath and elevated Troponin. He has been receiving chemotherapy. 2 D Echo shows moderately depressed Left ventriculare systolic function or 30-35 %.   Patient presented with A Fib with -160's this morning.   He was given Lopressor 5 mg IV and loaded with Digoxin today.   Pt has now converted back to sinus rhythm.   BNP elevated today at 678.     OBJECTIVE:     Vital Signs (Most Recent)  Temp: 98 °F (36.7 °C) (07/12/17 1157)  Pulse: (!) 130 (07/12/17 1157)  Resp: (!) 22 (07/12/17 1157)  BP: (!) 141/59 (07/12/17 1157)  SpO2: 97 % (07/12/17 1157)    Vital Signs Range (Last 24H):  Temp:  [97.6 °F (36.4 °C)-98.4 °F (36.9 °C)]   Pulse:  []   Resp:  [18-22]   BP: (104-141)/(59-69)   SpO2:  [95 %-98 %]     Intake/Output last 3 shifts:  I/O last 3 completed shifts:  In: 2860 [P.O.:660; I.V.:1500; IV Piggyback:700]  Out: 3150 [Urine:3150]    Intake/Output this shift:  I/O this shift:  In: -   Out: 1001 [Urine:1000; Stool:1]    Review of patient's allergies indicates:  No Known Allergies    Current Facility-Administered Medications   Medication    0.9%  NaCl infusion (for blood administration)    albuterol-ipratropium 2.5mg-0.5mg/3mL nebulizer solution 3 mL    aspirin EC tablet 81 mg    calcium carbonate 200 mg calcium (500 mg) chewable tablet 500 mg    ceFEPIme in dextrose 5% 2 gram/50 mL IVPB 2 g    digoxin injection 250 mcg    [START ON 7/13/2017] digoxin tablet 0.125 mg    furosemide injection 20 mg    metoprolol succinate (TOPROL-XL) 24 hr tablet 50 mg    nitroGLYCERIN SL tablet 0.4 mg    ramelteon tablet 8 mg     No current facility-administered medications on file prior to encounter.      Current Outpatient Prescriptions on File Prior to Encounter   Medication Sig    aspirin 81 MG Chew Take 81 mg by mouth once daily.    hydrocodone-acetaminophen 5-325mg  (NORCO) 5-325 mg per tablet Take 1 tablet by mouth every 6 (six) hours as needed for Pain.    ondansetron (ZOFRAN) 8 MG tablet Take 1 tablet (8 mg total) by mouth every 12 (twelve) hours as needed for Nausea.    valacyclovir (VALTREX) 500 MG tablet Take 1 tablet (500 mg total) by mouth once daily. (Patient taking differently: Take 500 mg by mouth daily as needed. )       Physical Exam:  General appearance: alert, appears stated age and cooperative  Head: Normocephalic, without obvious abnormality, atraumatic  Eyes:  conjunctivae/corneas clear. PERRL, EOM's intact. Fundi benign.  Nose: no discharge  Throat: normal findings: tongue midline and normal  Neck: no adenopathy, no carotid bruit, no JVD, supple, symmetrical, trachea midline and thyroid not enlarged, symmetric, no tenderness/mass/nodules  Back:  no skin lesions, erythema, or scars  Lungs: BBS rales posterior  Chest wall: no tenderness  Heart: regular rate and rhythm, S1, S2 normal, no murmur, click, rub or gallop  Abdomen: soft, non-tender; bowel sounds normal; no masses,  no organomegaly  Extremities: extremities normal, atraumatic, no cyanosis or edema  Pulses: 1+ and symmetric  Skin: Skin color, texture, turgor normal. No rashes or lesions  Neurologic: Grossly normal    Laboratory:  Chemistry:   Lab Results   Component Value Date     07/12/2017     07/12/2017    K 3.7 07/12/2017    K 3.7 07/12/2017     (H) 07/12/2017     (H) 07/12/2017    CO2 21 (L) 07/12/2017    CO2 21 (L) 07/12/2017    BUN 11 07/12/2017    BUN 11 07/12/2017    CREATININE 0.7 07/12/2017    CREATININE 0.7 07/12/2017    CALCIUM 8.3 (L) 07/12/2017    CALCIUM 8.3 (L) 07/12/2017     Cardiac Markers:   Lab Results   Component Value Date    TROPONINI 0.225 (H) 07/11/2017     Cardiac Markers (Last 3):   Lab Results   Component Value Date    TROPONINI 0.225 (H) 07/11/2017    TROPONINI 0.210 (H) 07/11/2017    TROPONINI 0.107 (H) 07/10/2017     CBC:   Lab Results    Component Value Date    WBC 10.53 07/12/2017    HGB 8.2 (L) 07/12/2017    HCT 24.7 (L) 07/12/2017     (H) 07/12/2017    PLT 27 (LL) 07/12/2017     Lipids:   Lab Results   Component Value Date    CHOL 189 10/29/2016    TRIG 90 10/29/2016    HDL 28 (L) 10/29/2016     Coagulation:   Lab Results   Component Value Date    INR 1.1 07/09/2017    APTT 32.0 07/09/2017       Diagnostic Results:  ECG: Reviewed  X-Ray: Reviewed  US: Reviewed  CT: Reviewed  Echo: Reviewed      ASSESSMENT/PLAN:     Patient Active Problem List   Diagnosis    Paroxysmal atrial fibrillation    AML (acute myeloblastic leukemia)    Pancytopenia due to antineoplastic chemotherapy    Anemia due to chemotherapy    Acute myeloid leukemia not having achieved remission    Thrombocytopenia    Atrial fibrillation, controlled    CT Compatible and MRI Safe Powerport in place    Symptomatic anemia    Fever    Atrial fibrillation with RVR    Elevated troponin    Acute on chronic diastolic congestive heart failure        Plan:    Will continue current medications and treatment plan  Low sodium diet of 1.5 grams daily and limit fluid intake to 1.5 liters daily   Continue B-blocker and ASA  Increase Lasix 20 mg BID  Digixon .0125 mg PO daily on tomorrow.     Chart reviewed. Dr. Dos Santos agrees with plan as outlined above.

## 2017-07-12 NOTE — NURSING
"Pt sitting on the side of the bed with hand on chest, pale, diaphoretic, sob, "i'm about to die", -190s. Nitro SL given, symptoms relieved 5 minutes after 2nd Nitro given. Dr. Vivas called to the bedside to assess pt, orders received. Pt placed on Venti Mask (was on 2L NC with pulse ox 88%).   "

## 2017-07-12 NOTE — NURSING
1st dose of cefepime, medication reviewed with patient, no sign/symptoms of adverse reaction.  Pt did not eat Breakfast or lunch today do to SOB.  Pt weaned off Venti mask at this time, tolerating 2L NC, pulse ox above 92%, pt sitting up trying to eat dinner.

## 2017-07-12 NOTE — ASSESSMENT & PLAN NOTE
The patient presents with a possible septic syndrome.  Prior to initiation of any further chemotherapy will make sure that the patient is medically stable before initiation of treatment will discuss with primary oncologist 1 situation is clarified.  Patient and rapid ventricular response major fibrillation medically not stable at this point will need to discuss when patient is medical stable as to whether or not to proceed with treatment for third line treatment for acute myelogenous leukemia with primary oncologist.  7/12/17 the patient's condition continues to deteriorate for general medical standpoint with recurrent episodes of atrial fibrillation with aberrant rapid ventricular response the patient's ECOG status is 3I do not feel that treatment warranted at present time talked to patient about CODE STATUS he is agreed to no code we happy to talk to family members as well as staff as I discussed this morning about long-term prognosis.  No treatment during this hospitalization would need to be reassessed by primary oncologist in outpatient setting

## 2017-07-12 NOTE — PROGRESS NOTES
Notified by Elizabeth, monitor tech that patients heart rate was in the 160s;entered patients room and he stated that he just got finished using the urinal and skinned his left elbow;mepilex applied to left elbow and assisted patient in taking off his jeans so he wouldn't be as hard and he wouldn't get so winded trying to use the urinal; becca with belt attached and wallet noted to be in pocket, placed in a personal belongings bag and put in patients closet; pts heart rate now 105

## 2017-07-12 NOTE — SUBJECTIVE & OBJECTIVE
Interval History: The patient condition is appears to be deteriorating is having atrial fibrillation with rapid ventricular response extremely short of breath I spoke with hospital medicine this morning and reviewed case with them of also spoken to the patient he has decided to proceed with no code orders I offered to talk to he and his family about treatment options in third line treatment of acute myelogenous chemotherapy in poor performance status patients    Oncology Treatment Plan:   OP DECITABINE Q4W    Medications:  Continuous Infusions:   Scheduled Meds:   aspirin  81 mg Oral Daily    ceFEPime (MAXIPIME) IVPB  2 g Intravenous Q8H    furosemide  20 mg Intravenous Daily    metoprolol succinate  50 mg Oral BID     PRN Meds:sodium chloride, albuterol-ipratropium 2.5mg-0.5mg/3mL, calcium carbonate, nitroGLYCERIN, ramelteon     Review of Systems   Constitutional: Positive for activity change, appetite change, diaphoresis and fatigue. Negative for chills, fever and unexpected weight change.   HENT: Negative for congestion, dental problem, drooling, ear discharge, ear pain, facial swelling, hearing loss, mouth sores, nosebleeds, postnasal drip, rhinorrhea, sinus pressure, sneezing, sore throat, tinnitus, trouble swallowing and voice change.    Eyes: Negative for photophobia, pain, discharge, redness, itching and visual disturbance.   Respiratory: Positive for chest tightness and shortness of breath. Negative for apnea, cough, choking, wheezing and stridor.    Cardiovascular: Positive for chest pain and palpitations. Negative for leg swelling.   Gastrointestinal: Negative for abdominal distention, abdominal pain, anal bleeding, blood in stool, constipation, diarrhea, nausea, rectal pain and vomiting.   Endocrine: Negative for cold intolerance, heat intolerance, polydipsia, polyphagia and polyuria.   Genitourinary: Negative for decreased urine volume, difficulty urinating, discharge, dysuria, enuresis, flank pain,  frequency, genital sores, hematuria, penile pain, penile swelling, scrotal swelling, testicular pain and urgency.   Musculoskeletal: Negative for arthralgias, back pain, gait problem, joint swelling, myalgias, neck pain and neck stiffness.   Skin: Negative for color change, pallor, rash and wound.   Allergic/Immunologic: Negative for environmental allergies, food allergies and immunocompromised state.   Neurological: Negative for dizziness, tremors, seizures, syncope, facial asymmetry, speech difficulty, weakness, light-headedness, numbness and headaches.   Hematological: Negative for adenopathy. Does not bruise/bleed easily.   Psychiatric/Behavioral: Positive for dysphoric mood. Negative for agitation, behavioral problems, confusion, decreased concentration, hallucinations, self-injury, sleep disturbance and suicidal ideas. The patient is nervous/anxious. The patient is not hyperactive.      Objective:     Vital Signs (Most Recent):  Temp: 97.6 °F (36.4 °C) (07/12/17 0442)  Pulse: 98 (07/12/17 0457)  Resp: 20 (07/12/17 0442)  BP: 118/66 (07/12/17 0442)  SpO2: 98 % (07/12/17 0442) Vital Signs (24h Range):  Temp:  [97.6 °F (36.4 °C)-98.4 °F (36.9 °C)] 97.6 °F (36.4 °C)  Pulse:  [] 98  Resp:  [18-28] 20  SpO2:  [95 %-100 %] 98 %  BP: (104-118)/(62-69) 118/66     Weight: 75.4 kg (166 lb 3.2 oz)  Body mass index is 25.27 kg/m².  Body surface area is 1.9 meters squared.      Intake/Output Summary (Last 24 hours) at 07/12/17 0838  Last data filed at 07/12/17 0810   Gross per 24 hour   Intake              270 ml   Output             3275 ml   Net            -3005 ml       Physical Exam   Constitutional: He is oriented to person, place, and time. He has a sickly appearance. He appears ill. He appears distressed.   HENT:   Head: Normocephalic.   Right Ear: External ear normal.   Left Ear: External ear normal.   Nose: Nose normal. Right sinus exhibits no maxillary sinus tenderness and no frontal sinus tenderness. Left  sinus exhibits no maxillary sinus tenderness and no frontal sinus tenderness.   Mouth/Throat: Oropharynx is clear and moist. No oropharyngeal exudate.   Eyes: EOM and lids are normal. Pupils are equal, round, and reactive to light. Right eye exhibits no discharge. Left eye exhibits no discharge. Right conjunctiva is not injected. Right conjunctiva has no hemorrhage. Left conjunctiva is not injected. Left conjunctiva has no hemorrhage. No scleral icterus. Right eye exhibits normal extraocular motion. Left eye exhibits normal extraocular motion.   Neck: Normal range of motion. Neck supple. No JVD present. No tracheal deviation present. No thyromegaly present.   Cardiovascular: Normal rate, regular rhythm and normal heart sounds.    Pulmonary/Chest: Effort normal and breath sounds normal. No stridor. No respiratory distress.   Abdominal: Soft. Bowel sounds are normal. He exhibits no mass. There is no hepatosplenomegaly, splenomegaly or hepatomegaly. There is no tenderness.   Musculoskeletal: Normal range of motion. He exhibits no edema or tenderness.   Lymphadenopathy:        Head (right side): No posterior auricular and no occipital adenopathy present.        Head (left side): No posterior auricular and no occipital adenopathy present.     He has no cervical adenopathy.        Right cervical: No superficial cervical, no deep cervical and no posterior cervical adenopathy present.       Left cervical: No superficial cervical, no deep cervical and no posterior cervical adenopathy present.     He has no axillary adenopathy.        Right: No supraclavicular adenopathy present.        Left: No supraclavicular adenopathy present.   Neurological: He is alert and oriented to person, place, and time. He has normal strength. No cranial nerve deficit. Coordination normal.   Skin: Skin is dry. No rash noted. He is not diaphoretic. No cyanosis or erythema. Nails show no clubbing.   Psychiatric: His behavior is normal. Judgment and  thought content normal. His mood appears anxious. Cognition and memory are normal. He exhibits a depressed mood.   Vitals reviewed.      Significant Labs:   BMP:   Recent Labs  Lab 07/11/17  0404 07/12/17 0448   * 135*  135*    141  141   K 4.4 3.7  3.7   * 111*  111*   CO2 19* 21*  21*   BUN 19 11  11   CREATININE 0.8 0.7  0.7   CALCIUM 8.5* 8.3*  8.3*   , CBC:   Recent Labs  Lab 07/11/17 0404 07/12/17 0448   WBC 10.29 10.53   HGB 9.1* 8.2*   HCT 27.4* 24.7*   PLT 33* 27*    and CMP:   Recent Labs  Lab 07/11/17 0404 07/12/17 0448    141  141   K 4.4 3.7  3.7   * 111*  111*   CO2 19* 21*  21*   * 135*  135*   BUN 19 11  11   CREATININE 0.8 0.7  0.7   CALCIUM 8.5* 8.3*  8.3*   PROT  --  5.5*   ALBUMIN 2.5* 2.4*  2.4*   BILITOT  --  0.6   ALKPHOS  --  54*   AST  --  20   ALT  --  46*   ANIONGAP 10 9  9   EGFRNONAA >60 >60  >60       Diagnostic Results:  I have reviewed and interpreted all pertinent imaging results/findings within the past 24 hours.

## 2017-07-12 NOTE — PROGRESS NOTES
[facility]  Hematology/Oncology  Progress Note    Patient Name: Carlos Jordan Jr.  Admission Date: 7/9/2017  Hospital Length of Stay: 2 days  Code Status: DNR     Subjective:     HPI:  62-year-old male who was scheduled to start today third line treatment for relapsed refractory acute myelogenous leukemia with Dacogen presented to the emergency room with fever and weakness was asked by ER physician whether or not to admit and reported non-neutropenic state.  Of told that I would admit to the hospital with blood cultures to make sure patient is not septic before consideration of further chemotherapy.    Interval History: The patient condition is appears to be deteriorating is having atrial fibrillation with rapid ventricular response extremely short of breath I spoke with hospital medicine this morning and reviewed case with them of also spoken to the patient he has decided to proceed with no code orders I offered to talk to he and his family about treatment options in third line treatment of acute myelogenous chemotherapy in poor performance status patients    Oncology Treatment Plan:   OP DECITABINE Q4W    Medications:  Continuous Infusions:   Scheduled Meds:   aspirin  81 mg Oral Daily    ceFEPime (MAXIPIME) IVPB  2 g Intravenous Q8H    furosemide  20 mg Intravenous Daily    metoprolol succinate  50 mg Oral BID     PRN Meds:sodium chloride, albuterol-ipratropium 2.5mg-0.5mg/3mL, calcium carbonate, nitroGLYCERIN, ramelteon     Review of Systems   Constitutional: Positive for activity change, appetite change, diaphoresis and fatigue. Negative for chills, fever and unexpected weight change.   HENT: Negative for congestion, dental problem, drooling, ear discharge, ear pain, facial swelling, hearing loss, mouth sores, nosebleeds, postnasal drip, rhinorrhea, sinus pressure, sneezing, sore throat, tinnitus, trouble swallowing and voice change.    Eyes: Negative for photophobia, pain, discharge,  redness, itching and visual disturbance.   Respiratory: Positive for chest tightness and shortness of breath. Negative for apnea, cough, choking, wheezing and stridor.    Cardiovascular: Positive for chest pain and palpitations. Negative for leg swelling.   Gastrointestinal: Negative for abdominal distention, abdominal pain, anal bleeding, blood in stool, constipation, diarrhea, nausea, rectal pain and vomiting.   Endocrine: Negative for cold intolerance, heat intolerance, polydipsia, polyphagia and polyuria.   Genitourinary: Negative for decreased urine volume, difficulty urinating, discharge, dysuria, enuresis, flank pain, frequency, genital sores, hematuria, penile pain, penile swelling, scrotal swelling, testicular pain and urgency.   Musculoskeletal: Negative for arthralgias, back pain, gait problem, joint swelling, myalgias, neck pain and neck stiffness.   Skin: Negative for color change, pallor, rash and wound.   Allergic/Immunologic: Negative for environmental allergies, food allergies and immunocompromised state.   Neurological: Negative for dizziness, tremors, seizures, syncope, facial asymmetry, speech difficulty, weakness, light-headedness, numbness and headaches.   Hematological: Negative for adenopathy. Does not bruise/bleed easily.   Psychiatric/Behavioral: Positive for dysphoric mood. Negative for agitation, behavioral problems, confusion, decreased concentration, hallucinations, self-injury, sleep disturbance and suicidal ideas. The patient is nervous/anxious. The patient is not hyperactive.      Objective:     Vital Signs (Most Recent):  Temp: 97.6 °F (36.4 °C) (07/12/17 0442)  Pulse: 98 (07/12/17 0457)  Resp: 20 (07/12/17 0442)  BP: 118/66 (07/12/17 0442)  SpO2: 98 % (07/12/17 0442) Vital Signs (24h Range):  Temp:  [97.6 °F (36.4 °C)-98.4 °F (36.9 °C)] 97.6 °F (36.4 °C)  Pulse:  [] 98  Resp:  [18-28] 20  SpO2:  [95 %-100 %] 98 %  BP: (104-118)/(62-69) 118/66     Weight: 75.4 kg (166 lb 3.2  oz)  Body mass index is 25.27 kg/m².  Body surface area is 1.9 meters squared.      Intake/Output Summary (Last 24 hours) at 07/12/17 0838  Last data filed at 07/12/17 0810   Gross per 24 hour   Intake              270 ml   Output             3275 ml   Net            -3005 ml       Physical Exam   Constitutional: He is oriented to person, place, and time. He has a sickly appearance. He appears ill. He appears distressed.   HENT:   Head: Normocephalic.   Right Ear: External ear normal.   Left Ear: External ear normal.   Nose: Nose normal. Right sinus exhibits no maxillary sinus tenderness and no frontal sinus tenderness. Left sinus exhibits no maxillary sinus tenderness and no frontal sinus tenderness.   Mouth/Throat: Oropharynx is clear and moist. No oropharyngeal exudate.   Eyes: EOM and lids are normal. Pupils are equal, round, and reactive to light. Right eye exhibits no discharge. Left eye exhibits no discharge. Right conjunctiva is not injected. Right conjunctiva has no hemorrhage. Left conjunctiva is not injected. Left conjunctiva has no hemorrhage. No scleral icterus. Right eye exhibits normal extraocular motion. Left eye exhibits normal extraocular motion.   Neck: Normal range of motion. Neck supple. No JVD present. No tracheal deviation present. No thyromegaly present.   Cardiovascular: Normal rate, regular rhythm and normal heart sounds.    Pulmonary/Chest: Effort normal and breath sounds normal. No stridor. No respiratory distress.   Abdominal: Soft. Bowel sounds are normal. He exhibits no mass. There is no hepatosplenomegaly, splenomegaly or hepatomegaly. There is no tenderness.   Musculoskeletal: Normal range of motion. He exhibits no edema or tenderness.   Lymphadenopathy:        Head (right side): No posterior auricular and no occipital adenopathy present.        Head (left side): No posterior auricular and no occipital adenopathy present.     He has no cervical adenopathy.        Right cervical: No  superficial cervical, no deep cervical and no posterior cervical adenopathy present.       Left cervical: No superficial cervical, no deep cervical and no posterior cervical adenopathy present.     He has no axillary adenopathy.        Right: No supraclavicular adenopathy present.        Left: No supraclavicular adenopathy present.   Neurological: He is alert and oriented to person, place, and time. He has normal strength. No cranial nerve deficit. Coordination normal.   Skin: Skin is dry. No rash noted. He is not diaphoretic. No cyanosis or erythema. Nails show no clubbing.   Psychiatric: His behavior is normal. Judgment and thought content normal. His mood appears anxious. Cognition and memory are normal. He exhibits a depressed mood.   Vitals reviewed.      Significant Labs:   BMP:   Recent Labs  Lab 07/11/17 0404 07/12/17 0448   * 135*  135*    141  141   K 4.4 3.7  3.7   * 111*  111*   CO2 19* 21*  21*   BUN 19 11  11   CREATININE 0.8 0.7  0.7   CALCIUM 8.5* 8.3*  8.3*   , CBC:   Recent Labs  Lab 07/11/17 0404 07/12/17 0448   WBC 10.29 10.53   HGB 9.1* 8.2*   HCT 27.4* 24.7*   PLT 33* 27*    and CMP:   Recent Labs  Lab 07/11/17  0404 07/12/17 0448    141  141   K 4.4 3.7  3.7   * 111*  111*   CO2 19* 21*  21*   * 135*  135*   BUN 19 11  11   CREATININE 0.8 0.7  0.7   CALCIUM 8.5* 8.3*  8.3*   PROT  --  5.5*   ALBUMIN 2.5* 2.4*  2.4*   BILITOT  --  0.6   ALKPHOS  --  54*   AST  --  20   ALT  --  46*   ANIONGAP 10 9  9   EGFRNONAA >60 >60  >60       Diagnostic Results:  I have reviewed and interpreted all pertinent imaging results/findings within the past 24 hours.    Assessment/Plan:     * Acute myeloid leukemia not having achieved remission    The patient presents with a possible septic syndrome.  Prior to initiation of any further chemotherapy will make sure that the patient is medically stable before initiation of treatment will discuss with  primary oncologist 1 situation is clarified.  Patient and rapid ventricular response major fibrillation medically not stable at this point will need to discuss when patient is medical stable as to whether or not to proceed with treatment for third line treatment for acute myelogenous leukemia with primary oncologist.  7/12/17 the patient's condition continues to deteriorate for general medical standpoint with recurrent episodes of atrial fibrillation with aberrant rapid ventricular response the patient's ECOG status is 3I do not feel that treatment warranted at present time talked to patient about CODE STATUS he is agreed to no code we happy to talk to family members as well as staff as I discussed this morning about long-term prognosis.  No treatment during this hospitalization would need to be reassessed by primary oncologist in outpatient setting            Thank you for your consult. I will follow-up with patient. Please contact us if you have any additional questions.     Brown Hudson MD  Hematology/Oncology  [facility]

## 2017-07-12 NOTE — PLAN OF CARE
Problem: Patient Care Overview  Goal: Plan of Care Review  Pt has been free from falls, injury or trauma this shift.  Pt was made a DNR today and a hospice consult ordered and Pt was evaluated by [facility].  POC reviewed with Pt.  Pt verbalized understanding.  Pt had AFib with RVR this shift and was given po lopressor, IV lopressor, Digoxin IV.  Pt converted back to SR with rate 80's-90's.  Pt remains on christensen mask.

## 2017-07-12 NOTE — PROGRESS NOTES
Home Oxygen Evaluation    Date Performed: 7/12/2017    1) Patient's Home O2 Sat on room air, while at rest: 87%        If O2 sats on room air at rest are 88% or below, patient qualifies. No additional testing needed. Document N/A in steps 2 and 3. If 89% or above, complete steps 2.      2) Patient's O2 Sat on room air while exercising:         If O2 sats on room air while exercising remain 89% or above patient does not qualify, no further testing needed Document N/A in step 3. If O2 sats on room air while exercising are 88% or below, continue to step 3.      3) Patient's O2 Sat while exercising on O2:  at  LPM         (Must show improvement from #2 for patients to qualify)    If O2 sats improve on oxygen, patient qualifies for portable oxygen. If not, the patient does not qualify.

## 2017-07-13 VITALS
DIASTOLIC BLOOD PRESSURE: 60 MMHG | SYSTOLIC BLOOD PRESSURE: 106 MMHG | TEMPERATURE: 98 F | RESPIRATION RATE: 18 BRPM | HEIGHT: 68 IN | BODY MASS INDEX: 24.48 KG/M2 | OXYGEN SATURATION: 92 % | HEART RATE: 85 BPM | WEIGHT: 161.5 LBS

## 2017-07-13 LAB
ALBUMIN SERPL BCP-MCNC: 2 G/DL
ANION GAP SERPL CALC-SCNC: 8 MMOL/L
ANISOCYTOSIS BLD QL SMEAR: SLIGHT
BASOPHILS NFR BLD: 0 %
BLASTS NFR BLD MANUAL: 36 %
BUN SERPL-MCNC: 16 MG/DL
CALCIUM SERPL-MCNC: 8.2 MG/DL
CHLORIDE SERPL-SCNC: 108 MMOL/L
CO2 SERPL-SCNC: 22 MMOL/L
CREAT SERPL-MCNC: 0.7 MG/DL
DACRYOCYTES BLD QL SMEAR: ABNORMAL
DIFFERENTIAL METHOD: ABNORMAL
EOSINOPHIL NFR BLD: 1 %
ERYTHROCYTE [DISTWIDTH] IN BLOOD BY AUTOMATED COUNT: 18.9 %
EST. GFR  (AFRICAN AMERICAN): >60 ML/MIN/1.73 M^2
EST. GFR  (NON AFRICAN AMERICAN): >60 ML/MIN/1.73 M^2
GLUCOSE SERPL-MCNC: 115 MG/DL
HCT VFR BLD AUTO: 22.8 %
HGB BLD-MCNC: 7.6 G/DL
LYMPHOCYTES NFR BLD: 41 %
MCH RBC QN AUTO: 34.9 PG
MCHC RBC AUTO-ENTMCNC: 33.3 %
MCV RBC AUTO: 105 FL
MONOCYTES NFR BLD: 19 %
NEUTROPHILS NFR BLD: 3 %
PATH REV BLD -IMP: NORMAL
PHOSPHATE SERPL-MCNC: 2.6 MG/DL
PLATELET # BLD AUTO: 25 K/UL
PMV BLD AUTO: 9.5 FL
POIKILOCYTOSIS BLD QL SMEAR: SLIGHT
POLYCHROMASIA BLD QL SMEAR: ABNORMAL
POTASSIUM SERPL-SCNC: 3.4 MMOL/L
RBC # BLD AUTO: 2.18 M/UL
SODIUM SERPL-SCNC: 138 MMOL/L
SPHEROCYTES BLD QL SMEAR: ABNORMAL
WBC # BLD AUTO: 11.57 K/UL

## 2017-07-13 PROCEDURE — 85007 BL SMEAR W/DIFF WBC COUNT: CPT

## 2017-07-13 PROCEDURE — 85027 COMPLETE CBC AUTOMATED: CPT

## 2017-07-13 PROCEDURE — 63600175 PHARM REV CODE 636 W HCPCS: Performed by: INTERNAL MEDICINE

## 2017-07-13 PROCEDURE — 99232 SBSQ HOSP IP/OBS MODERATE 35: CPT | Mod: ,,, | Performed by: INTERNAL MEDICINE

## 2017-07-13 PROCEDURE — 25000003 PHARM REV CODE 250: Performed by: INTERNAL MEDICINE

## 2017-07-13 PROCEDURE — 63600175 PHARM REV CODE 636 W HCPCS: Performed by: EMERGENCY MEDICINE

## 2017-07-13 PROCEDURE — 27000221 HC OXYGEN, UP TO 24 HOURS

## 2017-07-13 PROCEDURE — 36415 COLL VENOUS BLD VENIPUNCTURE: CPT

## 2017-07-13 PROCEDURE — 85060 BLOOD SMEAR INTERPRETATION: CPT | Mod: ,,, | Performed by: PATHOLOGY

## 2017-07-13 PROCEDURE — 80069 RENAL FUNCTION PANEL: CPT

## 2017-07-13 RX ORDER — IPRATROPIUM BROMIDE AND ALBUTEROL SULFATE 2.5; .5 MG/3ML; MG/3ML
3 SOLUTION RESPIRATORY (INHALATION) EVERY 4 HOURS PRN
Qty: 3 ML | Refills: 0 | Status: SHIPPED | OUTPATIENT
Start: 2017-07-13 | End: 2017-08-12

## 2017-07-13 RX ORDER — METOPROLOL SUCCINATE 50 MG/1
50 TABLET, EXTENDED RELEASE ORAL DAILY
Qty: 30 TABLET | Refills: 0 | Status: SHIPPED | OUTPATIENT
Start: 2017-07-13 | End: 2017-08-12

## 2017-07-13 RX ORDER — IPRATROPIUM BROMIDE AND ALBUTEROL SULFATE 2.5; .5 MG/3ML; MG/3ML
3 SOLUTION RESPIRATORY (INHALATION) EVERY 4 HOURS PRN
Refills: 0
Start: 2017-07-13 | End: 2017-07-13

## 2017-07-13 RX ORDER — FUROSEMIDE 20 MG/1
20 TABLET ORAL DAILY
Qty: 30 TABLET | Refills: 11 | Status: SHIPPED | OUTPATIENT
Start: 2017-07-13 | End: 2018-07-13

## 2017-07-13 RX ORDER — DIGOXIN 125 MCG
125 TABLET ORAL DAILY
Qty: 30 TABLET | Refills: 0 | Status: SHIPPED | OUTPATIENT
Start: 2017-07-13 | End: 2018-07-13

## 2017-07-13 RX ORDER — CALCIUM CARBONATE 200(500)MG
500 TABLET,CHEWABLE ORAL 3 TIMES DAILY PRN
COMMUNITY
Start: 2017-07-13 | End: 2018-07-13

## 2017-07-13 RX ADMIN — METOPROLOL SUCCINATE 50 MG: 50 TABLET, EXTENDED RELEASE ORAL at 08:07

## 2017-07-13 RX ADMIN — CEFEPIME HYDROCHLORIDE 2 G: 2 INJECTION, SOLUTION INTRAVENOUS at 08:07

## 2017-07-13 RX ADMIN — ASPIRIN 81 MG: 81 TABLET, COATED ORAL at 08:07

## 2017-07-13 RX ADMIN — DIGOXIN 0.12 MG: 0.12 TABLET ORAL at 08:07

## 2017-07-13 RX ADMIN — CEFEPIME HYDROCHLORIDE 2 G: 2 INJECTION, SOLUTION INTRAVENOUS at 12:07

## 2017-07-13 RX ADMIN — FUROSEMIDE 20 MG: 10 INJECTION, SOLUTION INTRAMUSCULAR; INTRAVENOUS at 08:07

## 2017-07-13 NOTE — PROGRESS NOTES
[facility] Hospital Medicine  Progress Note    Patient Name: Carlos Jordan Jr.  MRN: [id number]  Patient Class: IP- Inpatient   Admission Date: 7/9/2017  Length of Stay: 2 days  Attending Physician: Joce Vivas MD  Primary Care Provider: Primary Doctor No        Subjective:     Principal Problem:Acute myeloid leukemia not having achieved remission    HPI:  Chief Complaint   Patient presents with    Shortness of Breath    Fever         Review of patient's allergies indicates:  No Known Allergies      History obtained from the patient                            History of Present Illness: Carlos Jordan Jr. is a 62 y.o. male patient, with hx of COPD, AML on chemo, present s to the ER for SOB which started gradually today PTA. Pt reports that he is scheduled to receive chemo tomorrow and that last dosage received was over a month ago.  Associated sxs include fever (tmax 100.3) and dry cough. Patient denies any recent travel, long car trips, leg pain/swelling, CP, n/v, and all other sxs at this time. Pt denies taking any breathing tx at home. No further complaints or concerns at this time.     In the ER, pts labs showed a drop in the H/H to 8.3/25 with Platelets 30K. Oncologist Dr. Hudson recommended admit with IVF and giving him a unit of blood and empirically start Cefipime. Pt received a L if NS and is feeling a little better.     Hospital Course:  11 July:  Two episodes of chest pain overnight and rhythm change to Afib with RVR.  Troponin elevated with an elevation to BNP.  No fevers.  Daily blood counts with significant amount of blasts.  Oncology electing not to treat given his third recurrence.  Echo showed a significant drop in ventricular function compared to 8 months ago when he started treatment.  He is no longer a candidate for additional chemotherapy but could benefit symptomatically from management of his heart failure.  He elected to change his code status to DNR and discussed  options for hospice.    Interval History:  Two runs of afib with RVR, one last night and the other this morning.  Increased Betablocker.  Patient changed code status to DNR and reviewing hospice options.    Review of Systems   Constitutional: Negative for chills and fever.   HENT: Negative.  Negative for congestion and sore throat.    Eyes: Negative.  Negative for visual disturbance.   Respiratory: Positive for shortness of breath. Negative for cough and wheezing.    Cardiovascular: Positive for chest pain.   Gastrointestinal: Negative for abdominal pain, diarrhea, nausea and vomiting.   Endocrine: Negative.    Genitourinary: Negative.    Musculoskeletal: Negative.  Negative for myalgias and neck stiffness.   Skin: Negative.  Negative for color change and pallor.   Allergic/Immunologic: Negative.    Neurological: Positive for weakness.   Hematological: Negative.    Psychiatric/Behavioral: Negative.    All other systems reviewed and are negative.    Objective:     Vital Signs (Most Recent):  Temp: 99 °F (37.2 °C) (07/12/17 2020)  Pulse: 90 (07/12/17 2020)  Resp: 20 (07/12/17 2020)  BP: (!) 111/57 (07/12/17 2020)  SpO2: 98 % (07/12/17 2020) Vital Signs (24h Range):  Temp:  [97.6 °F (36.4 °C)-99 °F (37.2 °C)] 99 °F (37.2 °C)  Pulse:  [] 90  Resp:  [18-22] 20  SpO2:  [95 %-98 %] 98 %  BP: ()/(57-67) 111/57     Weight: 75.4 kg (166 lb 3.2 oz)  Body mass index is 25.27 kg/m².    Intake/Output Summary (Last 24 hours) at 07/12/17 2204  Last data filed at 07/12/17 1822   Gross per 24 hour   Intake              630 ml   Output             2001 ml   Net            -1371 ml      Physical Exam   Constitutional: He is oriented to person, place, and time. No distress.   Weak and deconditioned.  Thin nut not cachectic.   HENT:   Head: Normocephalic and atraumatic.   Mouth/Throat: Oropharynx is clear and moist.   Eyes: Conjunctivae and EOM are normal. Pupils are equal, round, and reactive to light.   Neck: No JVD  present. No thyromegaly present.   Cardiovascular: Normal heart sounds.  Exam reveals no gallop and no friction rub.    No murmur heard.  Tachycardia with an irregularly irregular rhythm   Pulmonary/Chest: Breath sounds normal. He has no wheezes. He has no rales.   Shallow and rapid breathing.   Abdominal: Soft. Bowel sounds are normal. He exhibits no distension. There is no tenderness. There is no rebound and no guarding.   Musculoskeletal: Normal range of motion. He exhibits no edema or tenderness.   Lymphadenopathy:     He has no cervical adenopathy.   Neurological: He is alert and oriented to person, place, and time. He has normal reflexes. He displays normal reflexes. No cranial nerve deficit.   Skin: Skin is warm and dry. No rash noted. He is not diaphoretic. No erythema.   Psychiatric: He has a normal mood and affect. His behavior is normal. Judgment and thought content normal.       Significant Labs:   CBC:     Recent Labs  Lab 07/11/17  0404 07/12/17  0448   WBC 10.29 10.53   HGB 9.1* 8.2*   HCT 27.4* 24.7*   PLT 33* 27*     CMP:     Recent Labs  Lab 07/11/17  0404 07/12/17  0448    141  141   K 4.4 3.7  3.7   * 111*  111*   CO2 19* 21*  21*   * 135*  135*   BUN 19 11  11   CREATININE 0.8 0.7  0.7   CALCIUM 8.5* 8.3*  8.3*   PROT  --  5.5*   ALBUMIN 2.5* 2.4*  2.4*   BILITOT  --  0.6   ALKPHOS  --  54*   AST  --  20   ALT  --  46*   ANIONGAP 10 9  9   EGFRNONAA >60 >60  >60       Significant Imaging: I have reviewed all pertinent imaging results/findings within the past 24 hours.    Assessment/Plan:      Acute on chronic diastolic congestive heart failure    Systolic heart failure attributable to chemotherapy.  Cardiology evaluating and making recommendations.  Optimize heart failure medication to improve quality of life.        Elevated troponin    Probably due to heart strain from atrial fibrillation with RVR but may also be from MI.  Trend troponin and Cardiology to  evaluate.        Atrial fibrillation with RVR    Change in rhythm day 2 of admission with rate up to 150s.  Probably a response to systemic illness related to fever with neutropenia and progression of AML.  Associated with modest increase in both Troponin and BNP and decreased blood pressure.  Increased MEtoprolol dose this morning.  Cardiology for further recommendations.        Fever    Possibly chemo related vs neutropenic fever.  Blood counts are variable with significant amount of blasts.  No S/s of any sepsis or infection and no fevers the last 24 hours.  Continue Cefepime empirically at 2 g every 8 hours.        Symptomatic anemia    Secondary to effects of chemotherapy.  May need Venofer.  Currently further chemo is on hold.        Thrombocytopenia    Chemo/AML related.  No signs of bleeding.  Will watch platelets and transfuse if Platelets < 10 or shows any bleeding.        * Acute myeloid leukemia not having achieved remission    Pt was scheduled to start next chemo cycle this Monday 10 July for his third line of treatment of AML.  Oncology is deferring chemotherapy.  Prognosis is poor having failed two regimens.  He also developed heart failure attributable to chemotherapy and is no longer a candidate for treatment.  Pursuing palliative options with hospice.          VTE Risk Mitigation         Ordered     High Risk of VTE  Once      07/10/17 0120     Place sequential compression device  Until discontinued      07/10/17 0120     Reason for No Pharmacological VTE Prophylaxis  Once      07/10/17 0120          Joce Vivas MD  Department of Hospital Medicine   [facility]

## 2017-07-13 NOTE — PROGRESS NOTES
Cardiology Progress Note        SUBJECTIVE:     History of Present Illness:  Patient is a 62 y.o. male presents with Systolic Heart Failure and PAF. He is feeling much better today. Pt has converted back to Sinus Rhythm and Systolic Heart Failure is well compensated.       OBJECTIVE:     Vital Signs (Most Recent)  Temp: 98.2 °F (36.8 °C) (07/13/17 1214)  Pulse: 83 (07/13/17 1214)  Resp: 18 (07/13/17 1214)  BP: (!) 91/56 (07/13/17 1214)  SpO2: (!) 91 % (07/13/17 1214)    Vital Signs Range (Last 24H):  Temp:  [97.7 °F (36.5 °C)-99.1 °F (37.3 °C)]   Pulse:  [76-90]   Resp:  [18-20]   BP: ()/(51-59)   SpO2:  [87 %-98 %]     Intake/Output last 3 shifts:  I/O last 3 completed shifts:  In: 1010 [P.O.:780; I.V.:30; IV Piggyback:200]  Out: 3351 [Urine:3350; Stool:1]    Intake/Output this shift:  I/O this shift:  In: 240 [P.O.:240]  Out: 250 [Urine:250]    Review of patient's allergies indicates:  No Known Allergies    Current Facility-Administered Medications   Medication    0.9%  NaCl infusion (for blood administration)    albuterol-ipratropium 2.5mg-0.5mg/3mL nebulizer solution 3 mL    aspirin EC tablet 81 mg    calcium carbonate 200 mg calcium (500 mg) chewable tablet 500 mg    ceFEPIme in dextrose 5% 2 gram/50 mL IVPB 2 g    digoxin tablet 0.125 mg    furosemide injection 20 mg    metoprolol succinate (TOPROL-XL) 24 hr tablet 50 mg    nitroGLYCERIN SL tablet 0.4 mg    ramelteon tablet 8 mg     No current facility-administered medications on file prior to encounter.      Current Outpatient Prescriptions on File Prior to Encounter   Medication Sig    aspirin 81 MG Chew Take 81 mg by mouth once daily.    hydrocodone-acetaminophen 5-325mg (NORCO) 5-325 mg per tablet Take 1 tablet by mouth every 6 (six) hours as needed for Pain.    ondansetron (ZOFRAN) 8 MG tablet Take 1 tablet (8 mg total) by mouth every 12 (twelve) hours as needed for Nausea.    [DISCONTINUED] valacyclovir (VALTREX) 500 MG tablet Take 1  tablet (500 mg total) by mouth once daily. (Patient taking differently: Take 500 mg by mouth daily as needed. )       Physical Exam:  General appearance: alert, appears stated age and cooperative  Head: Normocephalic, without obvious abnormality, atraumatic  Eyes:  conjunctivae/corneas clear. PERRL, EOM's intact. Fundi benign.  Nose: no discharge  Throat: normal findings: tongue midline and normal  Neck: no adenopathy, no carotid bruit, no JVD, supple, symmetrical, trachea midline and thyroid not enlarged, symmetric, no tenderness/mass/nodules  Back:  no skin lesions, erythema, or scars  Lungs:  clear to auscultation bilaterally  Chest wall: no tenderness  Heart: regular rate and rhythm, S1, S2 normal, no murmur, click, rub or gallop  Abdomen: soft, non-tender; bowel sounds normal; no masses,  no organomegaly  Extremities: extremities normal, atraumatic, no cyanosis or edema  Pulses: 1+ and symmetric  Skin: Skin color, texture, turgor normal. No rashes or lesions  Neurologic: Grossly normal    Laboratory:  Chemistry:   Lab Results   Component Value Date     07/13/2017    K 3.4 (L) 07/13/2017     07/13/2017    CO2 22 (L) 07/13/2017    BUN 16 07/13/2017    CREATININE 0.7 07/13/2017    CALCIUM 8.2 (L) 07/13/2017     Cardiac Markers:   Lab Results   Component Value Date    TROPONINI 0.225 (H) 07/11/2017     Cardiac Markers (Last 3):   Lab Results   Component Value Date    TROPONINI 0.225 (H) 07/11/2017    TROPONINI 0.210 (H) 07/11/2017    TROPONINI 0.107 (H) 07/10/2017     CBC:   Lab Results   Component Value Date    WBC 11.57 07/13/2017    HGB 7.6 (L) 07/13/2017    HCT 22.8 (L) 07/13/2017     (H) 07/13/2017    PLT 25 (LL) 07/13/2017     Lipids:   Lab Results   Component Value Date    CHOL 189 10/29/2016    TRIG 90 10/29/2016    HDL 28 (L) 10/29/2016     Coagulation:   Lab Results   Component Value Date    INR 1.1 07/09/2017    APTT 32.0 07/09/2017       Diagnostic Results:  ECG: Reviewed  X-Ray:  Reviewed  US: Reviewed  CT: Reviewed  Echo: Reviewed      ASSESSMENT/PLAN:     Patient Active Problem List   Diagnosis    Paroxysmal atrial fibrillation    AML (acute myeloblastic leukemia)    Pancytopenia due to antineoplastic chemotherapy    Anemia due to chemotherapy    Acute myeloid leukemia not having achieved remission    Thrombocytopenia    Atrial fibrillation, controlled    CT Compatible and MRI Safe Powerport in place    Symptomatic anemia    Fever    Atrial fibrillation with RVR    Elevated troponin    Acute on chronic diastolic congestive heart failure        Plan:    Will continue current treatment plan   Continue B blocker, Dig and ASA  No ACE for now due to low blood pressure   No anticoagulation for PAF due to severe anemia   Low sodium diet of 1.5-2 grams daily and limit fluid intake to 1.5-2 liters daily  Close follow up with CHF clinic after discharge     Chart reviewed. Dr. Dos Santos agrees with plan as outlined above.

## 2017-07-13 NOTE — ASSESSMENT & PLAN NOTE
Pt was scheduled to start next chemo cycle this Monday 10 July for his third line of treatment of AML.  Oncology is deferring chemotherapy.  Prognosis is poor having failed two regimens.  He also developed heart failure attributable to chemotherapy and is no longer a candidate for treatment.  Pursuing palliative options with hospice.

## 2017-07-13 NOTE — PROGRESS NOTES
[facility] -   Hematology/Oncology  Progress Note    Patient Name: Carlos Jordan Jr.  Admission Date: 7/9/2017  Hospital Length of Stay: 3 days  Code Status: DNR     Subjective:     HPI:  62-year-old male who was scheduled to start today third line treatment for relapsed refractory acute myelogenous leukemia with Dacogen presented to the emergency room with fever and weakness was asked by ER physician whether or not to admit and reported non-neutropenic state.  Of told that I would admit to the hospital with blood cultures to make sure patient is not septic before consideration of further chemotherapy.    Interval History: Patient's condition continues to deteriorate I've seen at the patient has been evaluated for hospice care and I agree entirely with this I believe this is a very of appropriate response in a person who has rapidly declining performance status as well as cardiovascular health patient has very little chance of response to third line treatment for acute myelogenous leukemia  Oncology Treatment Plan:   OP DECITABINE Q4W    Medications:  Continuous Infusions:   Scheduled Meds:   aspirin  81 mg Oral Daily    ceFEPime (MAXIPIME) IVPB  2 g Intravenous Q8H    digoxin  0.125 mg Oral Daily    furosemide  20 mg Intravenous BID    metoprolol succinate  50 mg Oral BID     PRN Meds:sodium chloride, albuterol-ipratropium 2.5mg-0.5mg/3mL, calcium carbonate, nitroGLYCERIN, ramelteon     Review of Systems   Constitutional: Positive for activity change, appetite change and fatigue. Negative for chills, diaphoresis, fever and unexpected weight change.   HENT: Negative for congestion, dental problem, drooling, ear discharge, ear pain, facial swelling, hearing loss, mouth sores, nosebleeds, postnasal drip, rhinorrhea, sinus pressure, sneezing, sore throat, tinnitus, trouble swallowing and voice change.    Eyes: Negative for photophobia, pain, discharge, redness, itching and visual disturbance.    Respiratory: Positive for shortness of breath. Negative for apnea, cough, choking, chest tightness, wheezing and stridor.    Cardiovascular: Negative for chest pain, palpitations and leg swelling.   Gastrointestinal: Negative for abdominal distention, abdominal pain, anal bleeding, blood in stool, constipation, diarrhea, nausea, rectal pain and vomiting.   Endocrine: Negative for cold intolerance, heat intolerance, polydipsia, polyphagia and polyuria.   Genitourinary: Negative for decreased urine volume, difficulty urinating, discharge, dysuria, enuresis, flank pain, frequency, genital sores, hematuria, penile pain, penile swelling, scrotal swelling, testicular pain and urgency.   Musculoskeletal: Negative for arthralgias, back pain, gait problem, joint swelling, myalgias, neck pain and neck stiffness.   Skin: Negative for color change, pallor, rash and wound.   Allergic/Immunologic: Negative for environmental allergies, food allergies and immunocompromised state.   Neurological: Positive for weakness. Negative for dizziness, tremors, seizures, syncope, facial asymmetry, speech difficulty, light-headedness, numbness and headaches.   Hematological: Negative for adenopathy. Does not bruise/bleed easily.   Psychiatric/Behavioral: Positive for confusion, decreased concentration and dysphoric mood. Negative for agitation, behavioral problems, hallucinations, self-injury, sleep disturbance and suicidal ideas. The patient is nervous/anxious. The patient is not hyperactive.      Objective:     Vital Signs (Most Recent):  Temp: 98.4 °F (36.9 °C) (07/13/17 0701)  Pulse: 76 (07/13/17 0701)  Resp: 20 (07/13/17 0701)  BP: (!) 101/57 (07/13/17 0701)  SpO2: 98 % (07/13/17 0701) Vital Signs (24h Range):  Temp:  [97.7 °F (36.5 °C)-99.1 °F (37.3 °C)] 98.4 °F (36.9 °C)  Pulse:  [] 76  Resp:  [20-22] 20  SpO2:  [87 %-98 %] 98 %  BP: ()/(51-67) 101/57     Weight: 73.3 kg (161 lb 8 oz)  Body mass index is 24.56 kg/m².  Body  surface area is 1.88 meters squared.      Intake/Output Summary (Last 24 hours) at 07/13/17 0821  Last data filed at 07/13/17 0600   Gross per 24 hour   Intake              960 ml   Output             1976 ml   Net            -1016 ml       Physical Exam   Constitutional: He is oriented to person, place, and time. He appears cachectic. He has a sickly appearance. He appears ill. He appears distressed.   HENT:   Head: Normocephalic.   Right Ear: External ear normal.   Left Ear: External ear normal.   Nose: Nose normal. Right sinus exhibits no maxillary sinus tenderness and no frontal sinus tenderness. Left sinus exhibits no maxillary sinus tenderness and no frontal sinus tenderness.   Mouth/Throat: Oropharynx is clear and moist. No oropharyngeal exudate.   Eyes: EOM and lids are normal. Pupils are equal, round, and reactive to light. Right eye exhibits no discharge. Left eye exhibits no discharge. Right conjunctiva is not injected. Right conjunctiva has no hemorrhage. Left conjunctiva is not injected. Left conjunctiva has no hemorrhage. No scleral icterus. Right eye exhibits normal extraocular motion. Left eye exhibits normal extraocular motion.   Neck: Normal range of motion. Neck supple. No JVD present. No tracheal deviation present. No thyromegaly present.   Cardiovascular: Normal rate, regular rhythm and normal heart sounds.    Pulmonary/Chest: No stridor. He is in respiratory distress. He has wheezes.   Abdominal: Soft. Bowel sounds are normal. He exhibits no mass. There is no hepatosplenomegaly, splenomegaly or hepatomegaly. There is no tenderness.   Musculoskeletal: Normal range of motion. He exhibits no edema or tenderness.   Lymphadenopathy:        Head (right side): No posterior auricular and no occipital adenopathy present.        Head (left side): No posterior auricular and no occipital adenopathy present.     He has no cervical adenopathy.        Right cervical: No superficial cervical, no deep cervical  and no posterior cervical adenopathy present.       Left cervical: No superficial cervical, no deep cervical and no posterior cervical adenopathy present.     He has no axillary adenopathy.        Right: No supraclavicular adenopathy present.        Left: No supraclavicular adenopathy present.   Neurological: He is alert and oriented to person, place, and time. He has normal strength. No cranial nerve deficit. Coordination normal.   Skin: Skin is dry. No rash noted. He is not diaphoretic. No cyanosis or erythema. Nails show no clubbing.   Psychiatric: His behavior is normal. Judgment and thought content normal. His mood appears anxious. Cognition and memory are normal. He exhibits a depressed mood.   Vitals reviewed.      Significant Labs:   BMP:   Recent Labs  Lab 07/12/17 0448 07/13/17 0443   *  135* 115*     141 138   K 3.7  3.7 3.4*   *  111* 108   CO2 21*  21* 22*   BUN 11  11 16   CREATININE 0.7  0.7 0.7   CALCIUM 8.3*  8.3* 8.2*   , CBC:   Recent Labs  Lab 07/12/17 0448 07/13/17 0443   WBC 10.53 11.57   HGB 8.2* 7.6*   HCT 24.7* 22.8*   PLT 27* 25*    and CMP:   Recent Labs  Lab 07/12/17 0448 07/13/17 0443     141 138   K 3.7  3.7 3.4*   *  111* 108   CO2 21*  21* 22*   *  135* 115*   BUN 11  11 16   CREATININE 0.7  0.7 0.7   CALCIUM 8.3*  8.3* 8.2*   PROT 5.5*  --    ALBUMIN 2.4*  2.4* 2.0*   BILITOT 0.6  --    ALKPHOS 54*  --    AST 20  --    ALT 46*  --    ANIONGAP 9  9 8   EGFRNONAA >60  >60 >60       Diagnostic Results:  I have reviewed and interpreted all pertinent imaging results/findings within the past 24 hours.    Assessment/Plan:     * Acute myeloid leukemia not having achieved remission    The patient presents with a possible septic syndrome.  Prior to initiation of any further chemotherapy will make sure that the patient is medically stable before initiation of treatment will discuss with primary oncologist 1 situation is clarified.   Patient and rapid ventricular response major fibrillation medically not stable at this point will need to discuss when patient is medical stable as to whether or not to proceed with treatment for third line treatment for acute myelogenous leukemia with primary oncologist.  7/12/17 the patient's condition continues to deteriorate for general medical standpoint with recurrent episodes of atrial fibrillation with aberrant rapid ventricular response the patient's ECOG status is 3I do not feel that treatment warranted at present time talked to patient about CODE STATUS he is agreed to no code we happy to talk to family members as well as staff as I discussed this morning about long-term prognosis.  No treatment during this hospitalization would need to be reassessed by primary oncologist in outpatient setting.  713 declining performance status agree with hospice evaluation will be happy to talk to family if warranted otherwise agree entirely with plan for palliative care and treatment            Thank you for your consult. I will follow-up with patient. Please contact us if you have any additional questions.     Brown Hudson MD  Hematology/Oncology  [facility]

## 2017-07-13 NOTE — ASSESSMENT & PLAN NOTE
The patient presents with a possible septic syndrome.  Prior to initiation of any further chemotherapy will make sure that the patient is medically stable before initiation of treatment will discuss with primary oncologist 1 situation is clarified.  Patient and rapid ventricular response major fibrillation medically not stable at this point will need to discuss when patient is medical stable as to whether or not to proceed with treatment for third line treatment for acute myelogenous leukemia with primary oncologist.  7/12/17 the patient's condition continues to deteriorate for general medical standpoint with recurrent episodes of atrial fibrillation with aberrant rapid ventricular response the patient's ECOG status is 3I do not feel that treatment warranted at present time talked to patient about CODE STATUS he is agreed to no code we happy to talk to family members as well as staff as I discussed this morning about long-term prognosis.  No treatment during this hospitalization would need to be reassessed by primary oncologist in outpatient setting.  713 declining performance status agree with hospice evaluation will be happy to talk to family if warranted otherwise agree entirely with plan for palliative care and treatment

## 2017-07-13 NOTE — PLAN OF CARE
07/13/17 1311   Final Note   Assessment Type Final Discharge Note   Discharge Disposition [facility]   What phone number can be called within the next 1-3 days to see how you are doing after discharge? ([phone])   Offered [facility] -- Bedside Delivery? Yes   Referral to / orders for Home Health Complete? Yes  ([facility])

## 2017-07-13 NOTE — PLAN OF CARE
Problem: Patient Care Overview  Goal: Plan of Care Review  Pt had a fair night. No complaints throughout night. Still on venti mask. Taking on and off during the night. O2 sats remained stable.

## 2017-07-13 NOTE — DISCHARGE SUMMARY
[facility] Hospital Medicine  Discharge Summary      Patient Name: Carlos Jordan Jr.  MRN: [id number]  Admission Date: 7/9/2017  Hospital Length of Stay: 3 days  Discharge Date and Time:  07/13/2017 12:31 PM  Attending Physician: Vamshi Connell MD   Discharging Provider: Rebeca Nur NP  Primary Care Provider: Primary Doctor No      HPI:   Chief Complaint   Patient presents with    Shortness of Breath    Fever         Review of patient's allergies indicates:  No Known Allergies      History obtained from the patient                            History of Present Illness: Carlos Jordan Jr. is a 62 y.o. male patient, with hx of COPD, AML on chemo, present s to the ER for SOB which started gradually today PTA. Pt reports that he is scheduled to receive chemo tomorrow and that last dosage received was over a month ago.  Associated sxs include fever (tmax 100.3) and dry cough. Patient denies any recent travel, long car trips, leg pain/swelling, CP, n/v, and all other sxs at this time. Pt denies taking any breathing tx at home. No further complaints or concerns at this time.     In the ER, pts labs showed a drop in the H/H to 8.3/25 with Platelets 30K. Oncologist Dr. Hudson recommended admit with IVF and giving him a unit of blood and empirically start Cefipime. Pt received a L if NS and is feeling a little better.     * No surgery found *      Indwelling Lines/Drains at time of discharge:   Lines/Drains/Airways          No matching active lines, drains, or airways        Hospital Course:   Patient admitted for possible septic syndrome. Patient has a history of acute myelogenous leukemia and is currently being treated with chemo, last chemo was a month ago and is scheduled for his third line of treatment. In the ER, pts labs showed a drop in the H/H to 8.3/25 with Platelets 30K. Oncology consulted.  Oncologist Dr. Hudson recommended admit with IVF and giving him a unit of blood and empirically  start Cefipime. In the ER, Pt received a 1L NS and is feeling a little better. During hospital stay patient had two episodes of chest pain overnight and rhythm change to Afib with RVR. He was given Lopressor 5 mg IV and loaded with Digoxin  And converted  Back to sinus rhythm. Cardiology consulted. Troponin elevated with an elevation to BNP. 2 D Echo shows moderately depressed Left ventriculare systolic function or 30-35 %. No fevers. Daily blood counts with significant amount of blasts. Oncology electing not to treat given his third recurrence. He is no longer a candidate for additional chemotherapy but could benefit symptomatically from management of his heart failure. Will continue current medications Digoxin, Metoprolol, Lasix, and ASA. He elected to change his code status to DNR and discussed options for hospice. Patient agreed with hospice at home.  consulted for discharge planning (Home Hospice). Metoprolol dose decreased to 50 mg daily due to hypotension. Patient seen and examined on date of discharge and found suitable for discharge.          Consults:   Consults         Status Ordering Provider     Inpatient consult to Cardiology  Once     Provider:  Vernon Dos Santos MD    Completed DIVYA PARKER     Inpatient consult to Oncology  Once     Provider:  Brown Hudson MD    Acknowledged JENNIFER RENTERIA     Inpatient consult to Social Work  Once     Provider:  (Not yet assigned)    Completed DIVYA PARKER          Significant Diagnostic Studies: Labs:   BMP:   Recent Labs  Lab 07/12/17  0448 07/13/17  0443   *  135* 115*     141 138   K 3.7  3.7 3.4*   *  111* 108   CO2 21*  21* 22*   BUN 11  11 16   CREATININE 0.7  0.7 0.7   CALCIUM 8.3*  8.3* 8.2*   , CMP   Recent Labs  Lab 07/12/17  0448 07/13/17  0443     141 138   K 3.7  3.7 3.4*   *  111* 108   CO2 21*  21* 22*   *  135* 115*   BUN 11  11 16   CREATININE 0.7  0.7 0.7    CALCIUM 8.3*  8.3* 8.2*   PROT 5.5*  --    ALBUMIN 2.4*  2.4* 2.0*   BILITOT 0.6  --    ALKPHOS 54*  --    AST 20  --    ALT 46*  --    ANIONGAP 9  9 8   ESTGFRAFRICA >60  >60 >60   EGFRNONAA >60  >60 >60   , CBC   Recent Labs  Lab 07/12/17  0448 07/13/17  0443   WBC 10.53 11.57   HGB 8.2* 7.6*   HCT 24.7* 22.8*   PLT 27* 25*   , Troponin   Recent Labs  Lab 07/11/17  1526   TROPONINI 0.225*    and All labs within the past 24 hours have been reviewed    Pending Diagnostic Studies:     Procedure Component Value Units Date/Time    Troponin I [[id number]]     Order Status:  Sent Lab Status:  No result     Specimen:  Blood from Blood         Final Active Diagnoses:    Diagnosis Date Noted POA    PRINCIPAL PROBLEM:  Acute myeloid leukemia not having achieved remission [C92.00] 11/24/2016 Yes    Atrial fibrillation with RVR [I48.91] 07/11/2017 No    Elevated troponin [R74.8] 07/11/2017 No    Acute on chronic diastolic congestive heart failure [I50.33] 07/11/2017 Yes    Symptomatic anemia [D64.9] 07/10/2017 Yes    Fever [R50.9] 07/10/2017 Yes    Thrombocytopenia [D69.6] 01/23/2017 Yes      Problems Resolved During this Admission:    Diagnosis Date Noted Date Resolved POA      Discharged Condition: poor    Disposition: Hospice/Home    Follow Up:  Follow-up Information     [facility].    Specialty:  Hospice Services  Contact information:  [address] [place] [address] [phone]                 Patient Instructions:     Diet general     Activity as tolerated     Call MD for:  temperature >100.4     Call MD for:  persistent nausea and vomiting or diarrhea     Call MD for:  severe uncontrolled pain     Call MD for:  difficulty breathing or increased cough     Call MD for:  severe persistent headache     Call MD for:  persistent dizziness, light-headedness, or visual disturbances     Call MD for:  increased confusion or weakness       Medications:  Reconciled Home Medications:    Current Discharge Medication List      START taking these medications    Details   albuterol-ipratropium 2.5mg-0.5mg/3mL (DUO-NEB) 0.5 mg-3 mg(2.5 mg base)/3 mL nebulizer solution Take 3 mLs by nebulization every 4 (four) hours as needed for Wheezing. Rescue  Refills: 0      calcium carbonate (TUMS) 200 mg calcium (500 mg) chewable tablet Take 1 tablet (500 mg total) by mouth 3 (three) times daily as needed.      digoxin (LANOXIN) 125 mcg tablet Take 1 tablet (125 mcg total) by mouth once daily.  Qty: 30 tablet, Refills: 0      furosemide (LASIX) 20 MG tablet Take 1 tablet (20 mg total) by mouth once daily.  Qty: 30 tablet, Refills: 11      metoprolol succinate (TOPROL-XL) 50 MG 24 hr tablet Take 1 tablet (50 mg total) by mouth once daily.  Qty: 30 tablet, Refills: 0         CONTINUE these medications which have NOT CHANGED    Details   aspirin 81 MG Chew Take 81 mg by mouth once daily.      hydrocodone-acetaminophen 5-325mg (NORCO) 5-325 mg per tablet Take 1 tablet by mouth every 6 (six) hours as needed for Pain.  Qty: 20 tablet, Refills: 0      ondansetron (ZOFRAN) 8 MG tablet Take 1 tablet (8 mg total) by mouth every 12 (twelve) hours as needed for Nausea.  Qty: 30 tablet, Refills: 2    Associated Diagnoses: Acute myeloid leukemia not having achieved remission         STOP taking these medications       valacyclovir (VALTREX) 500 MG tablet Comments:   Reason for Stopping:             Time spent on the discharge of patient: 45 minutes    Rebeca Nur NP  Department of Hospital Medicine  [facility]

## 2017-07-13 NOTE — ASSESSMENT & PLAN NOTE
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  Cardiology evaluating and making recommendations.  Optimize heart failure medication to improve quality of life.

## 2017-07-13 NOTE — SUBJECTIVE & OBJECTIVE
Interval History: Patient's condition continues to deteriorate I've seen at the patient has been evaluated for hospice care and I agree entirely with this I believe this is a very of appropriate response in a person who has rapidly declining performance status as well as cardiovascular health patient has very little chance of response to third line treatment for acute myelogenous leukemia  Oncology Treatment Plan:   OP DECITABINE Q4W    Medications:  Continuous Infusions:   Scheduled Meds:   aspirin  81 mg Oral Daily    ceFEPime (MAXIPIME) IVPB  2 g Intravenous Q8H    digoxin  0.125 mg Oral Daily    furosemide  20 mg Intravenous BID    metoprolol succinate  50 mg Oral BID     PRN Meds:sodium chloride, albuterol-ipratropium 2.5mg-0.5mg/3mL, calcium carbonate, nitroGLYCERIN, ramelteon     Review of Systems   Constitutional: Positive for activity change, appetite change and fatigue. Negative for chills, diaphoresis, fever and unexpected weight change.   HENT: Negative for congestion, dental problem, drooling, ear discharge, ear pain, facial swelling, hearing loss, mouth sores, nosebleeds, postnasal drip, rhinorrhea, sinus pressure, sneezing, sore throat, tinnitus, trouble swallowing and voice change.    Eyes: Negative for photophobia, pain, discharge, redness, itching and visual disturbance.   Respiratory: Positive for shortness of breath. Negative for apnea, cough, choking, chest tightness, wheezing and stridor.    Cardiovascular: Negative for chest pain, palpitations and leg swelling.   Gastrointestinal: Negative for abdominal distention, abdominal pain, anal bleeding, blood in stool, constipation, diarrhea, nausea, rectal pain and vomiting.   Endocrine: Negative for cold intolerance, heat intolerance, polydipsia, polyphagia and polyuria.   Genitourinary: Negative for decreased urine volume, difficulty urinating, discharge, dysuria, enuresis, flank pain, frequency, genital sores, hematuria, penile pain, penile  swelling, scrotal swelling, testicular pain and urgency.   Musculoskeletal: Negative for arthralgias, back pain, gait problem, joint swelling, myalgias, neck pain and neck stiffness.   Skin: Negative for color change, pallor, rash and wound.   Allergic/Immunologic: Negative for environmental allergies, food allergies and immunocompromised state.   Neurological: Positive for weakness. Negative for dizziness, tremors, seizures, syncope, facial asymmetry, speech difficulty, light-headedness, numbness and headaches.   Hematological: Negative for adenopathy. Does not bruise/bleed easily.   Psychiatric/Behavioral: Positive for confusion, decreased concentration and dysphoric mood. Negative for agitation, behavioral problems, hallucinations, self-injury, sleep disturbance and suicidal ideas. The patient is nervous/anxious. The patient is not hyperactive.      Objective:     Vital Signs (Most Recent):  Temp: 98.4 °F (36.9 °C) (07/13/17 0701)  Pulse: 76 (07/13/17 0701)  Resp: 20 (07/13/17 0701)  BP: (!) 101/57 (07/13/17 0701)  SpO2: 98 % (07/13/17 0701) Vital Signs (24h Range):  Temp:  [97.7 °F (36.5 °C)-99.1 °F (37.3 °C)] 98.4 °F (36.9 °C)  Pulse:  [] 76  Resp:  [20-22] 20  SpO2:  [87 %-98 %] 98 %  BP: ()/(51-67) 101/57     Weight: 73.3 kg (161 lb 8 oz)  Body mass index is 24.56 kg/m².  Body surface area is 1.88 meters squared.      Intake/Output Summary (Last 24 hours) at 07/13/17 0821  Last data filed at 07/13/17 0600   Gross per 24 hour   Intake              960 ml   Output             1976 ml   Net            -1016 ml       Physical Exam   Constitutional: He is oriented to person, place, and time. He appears cachectic. He has a sickly appearance. He appears ill. He appears distressed.   HENT:   Head: Normocephalic.   Right Ear: External ear normal.   Left Ear: External ear normal.   Nose: Nose normal. Right sinus exhibits no maxillary sinus tenderness and no frontal sinus tenderness. Left sinus exhibits no  maxillary sinus tenderness and no frontal sinus tenderness.   Mouth/Throat: Oropharynx is clear and moist. No oropharyngeal exudate.   Eyes: EOM and lids are normal. Pupils are equal, round, and reactive to light. Right eye exhibits no discharge. Left eye exhibits no discharge. Right conjunctiva is not injected. Right conjunctiva has no hemorrhage. Left conjunctiva is not injected. Left conjunctiva has no hemorrhage. No scleral icterus. Right eye exhibits normal extraocular motion. Left eye exhibits normal extraocular motion.   Neck: Normal range of motion. Neck supple. No JVD present. No tracheal deviation present. No thyromegaly present.   Cardiovascular: Normal rate, regular rhythm and normal heart sounds.    Pulmonary/Chest: No stridor. He is in respiratory distress. He has wheezes.   Abdominal: Soft. Bowel sounds are normal. He exhibits no mass. There is no hepatosplenomegaly, splenomegaly or hepatomegaly. There is no tenderness.   Musculoskeletal: Normal range of motion. He exhibits no edema or tenderness.   Lymphadenopathy:        Head (right side): No posterior auricular and no occipital adenopathy present.        Head (left side): No posterior auricular and no occipital adenopathy present.     He has no cervical adenopathy.        Right cervical: No superficial cervical, no deep cervical and no posterior cervical adenopathy present.       Left cervical: No superficial cervical, no deep cervical and no posterior cervical adenopathy present.     He has no axillary adenopathy.        Right: No supraclavicular adenopathy present.        Left: No supraclavicular adenopathy present.   Neurological: He is alert and oriented to person, place, and time. He has normal strength. No cranial nerve deficit. Coordination normal.   Skin: Skin is dry. No rash noted. He is not diaphoretic. No cyanosis or erythema. Nails show no clubbing.   Psychiatric: His behavior is normal. Judgment and thought content normal. His mood  appears anxious. Cognition and memory are normal. He exhibits a depressed mood.   Vitals reviewed.      Significant Labs:   BMP:   Recent Labs  Lab 07/12/17 0448 07/13/17 0443   *  135* 115*     141 138   K 3.7  3.7 3.4*   *  111* 108   CO2 21*  21* 22*   BUN 11  11 16   CREATININE 0.7  0.7 0.7   CALCIUM 8.3*  8.3* 8.2*   , CBC:   Recent Labs  Lab 07/12/17 0448 07/13/17 0443   WBC 10.53 11.57   HGB 8.2* 7.6*   HCT 24.7* 22.8*   PLT 27* 25*    and CMP:   Recent Labs  Lab 07/12/17 0448 07/13/17 0443     141 138   K 3.7  3.7 3.4*   *  111* 108   CO2 21*  21* 22*   *  135* 115*   BUN 11  11 16   CREATININE 0.7  0.7 0.7   CALCIUM 8.3*  8.3* 8.2*   PROT 5.5*  --    ALBUMIN 2.4*  2.4* 2.0*   BILITOT 0.6  --    ALKPHOS 54*  --    AST 20  --    ALT 46*  --    ANIONGAP 9  9 8   EGFRNONAA >60  >60 >60       Diagnostic Results:  I have reviewed and interpreted all pertinent imaging results/findings within the past 24 hours.

## 2017-07-13 NOTE — PROGRESS NOTES
Pt discharged per MD orders.  IV and tele monitor removed.  Discharge instructions given to Pt.  Pt verbalized understanding.  02 tanks with Pt for home use.  Pt discharging with [facility].  Pt transported to private vehicle via wheelchair.

## 2017-07-13 NOTE — ASSESSMENT & PLAN NOTE
Change in rhythm day 2 of admission with rate up to 150s.  Probably a response to systemic illness related to fever with neutropenia and progression of AML.  Associated with modest increase in both Troponin and BNP and decreased blood pressure.  Increased MEtoprolol dose this morning.  Cardiology for further recommendations.

## 2017-07-13 NOTE — SUBJECTIVE & OBJECTIVE
Interval History:  Two runs of afib with RVR, one last night and the other this morning.  Increased Betablocker.  Patient changed code status to DNR and reviewing hospice options.    Review of Systems   Constitutional: Negative for chills and fever.   HENT: Negative.  Negative for congestion and sore throat.    Eyes: Negative.  Negative for visual disturbance.   Respiratory: Positive for shortness of breath. Negative for cough and wheezing.    Cardiovascular: Positive for chest pain.   Gastrointestinal: Negative for abdominal pain, diarrhea, nausea and vomiting.   Endocrine: Negative.    Genitourinary: Negative.    Musculoskeletal: Negative.  Negative for myalgias and neck stiffness.   Skin: Negative.  Negative for color change and pallor.   Allergic/Immunologic: Negative.    Neurological: Positive for weakness.   Hematological: Negative.    Psychiatric/Behavioral: Negative.    All other systems reviewed and are negative.    Objective:     Vital Signs (Most Recent):  Temp: 99 °F (37.2 °C) (07/12/17 2020)  Pulse: 90 (07/12/17 2020)  Resp: 20 (07/12/17 2020)  BP: (!) 111/57 (07/12/17 2020)  SpO2: 98 % (07/12/17 2020) Vital Signs (24h Range):  Temp:  [97.6 °F (36.4 °C)-99 °F (37.2 °C)] 99 °F (37.2 °C)  Pulse:  [] 90  Resp:  [18-22] 20  SpO2:  [95 %-98 %] 98 %  BP: ()/(57-67) 111/57     Weight: 75.4 kg (166 lb 3.2 oz)  Body mass index is 25.27 kg/m².    Intake/Output Summary (Last 24 hours) at 07/12/17 2204  Last data filed at 07/12/17 1822   Gross per 24 hour   Intake              630 ml   Output             2001 ml   Net            -1371 ml      Physical Exam   Constitutional: He is oriented to person, place, and time. No distress.   Weak and deconditioned.  Thin nut not cachectic.   HENT:   Head: Normocephalic and atraumatic.   Mouth/Throat: Oropharynx is clear and moist.   Eyes: Conjunctivae and EOM are normal. Pupils are equal, round, and reactive to light.   Neck: No JVD present. No thyromegaly  present.   Cardiovascular: Normal heart sounds.  Exam reveals no gallop and no friction rub.    No murmur heard.  Tachycardia with an irregularly irregular rhythm   Pulmonary/Chest: Breath sounds normal. He has no wheezes. He has no rales.   Shallow and rapid breathing.   Abdominal: Soft. Bowel sounds are normal. He exhibits no distension. There is no tenderness. There is no rebound and no guarding.   Musculoskeletal: Normal range of motion. He exhibits no edema or tenderness.   Lymphadenopathy:     He has no cervical adenopathy.   Neurological: He is alert and oriented to person, place, and time. He has normal reflexes. He displays normal reflexes. No cranial nerve deficit.   Skin: Skin is warm and dry. No rash noted. He is not diaphoretic. No erythema.   Psychiatric: He has a normal mood and affect. His behavior is normal. Judgment and thought content normal.       Significant Labs:   CBC:     Recent Labs  Lab 07/11/17  0404 07/12/17  0448   WBC 10.29 10.53   HGB 9.1* 8.2*   HCT 27.4* 24.7*   PLT 33* 27*     CMP:     Recent Labs  Lab 07/11/17  0404 07/12/17  0448    141  141   K 4.4 3.7  3.7   * 111*  111*   CO2 19* 21*  21*   * 135*  135*   BUN 19 11  11   CREATININE 0.8 0.7  0.7   CALCIUM 8.5* 8.3*  8.3*   PROT  --  5.5*   ALBUMIN 2.5* 2.4*  2.4*   BILITOT  --  0.6   ALKPHOS  --  54*   AST  --  20   ALT  --  46*   ANIONGAP 10 9  9   EGFRNONAA >60 >60  >60       Significant Imaging: I have reviewed all pertinent imaging results/findings within the past 24 hours.

## 2017-07-13 NOTE — PLAN OF CARE
07/13/17 1345   Medicare Message   Important Message from Medicare regarding Discharge Appeal Rights Given to patient/caregiver;Explained to patient/caregiver;Signed/date by patient/caregiver   Date IMM was signed 07/13/17   Time IMM was signed 1924

## 2017-07-15 LAB
BACTERIA BLD CULT: NORMAL
BACTERIA BLD CULT: NORMAL

## 2017-07-24 ENCOUNTER — TELEPHONE (OUTPATIENT)
Dept: HEMATOLOGY/ONCOLOGY | Facility: CLINIC | Age: 62
End: 2017-07-24

## 2017-07-24 NOTE — TELEPHONE ENCOUNTER
----- Message from Teri Muñiz sent at 7/24/2017 11:48 AM CDT -----  Contact: katlyn bui- care manager for [facility]-[phone]  Would like to consult with nurse regarding patient's current location. She has lost contact of patient and last she heard he was in the hospital. Please call back at [phone].      Thanks,  Teri Muñiz

## 2022-06-28 NOTE — ANESTHESIA RELEASE NOTE
"Anesthesia Release from PACU Note    Patient: Carlos Jordan Jr.    Procedure(s) Performed: Procedure(s) (LRB):  [id number] (Right)    Anesthesia type: MAC    Post pain: Adequate analgesia    Post assessment: no apparent anesthetic complications, tolerated procedure well and no evidence of recall    Last Vitals:   Visit Vitals  /70 (BP Location: Right arm, Patient Position: Sitting, BP Method: Automatic)   Pulse 85   Temp 36.6 °C (97.8 °F) (Oral)   Resp 18   Ht 5' 8" (1.727 m)   Wt 72.6 kg (160 lb)   SpO2 97%   BMI 24.33 kg/m²       Post vital signs: stable    Level of consciousness: responds to stimulation    Nausea/Vomiting: no nausea/no vomiting    Complications: none    Airway Patency: patent    Respiratory: unassisted    Cardiovascular: stable and blood pressure at baseline    Hydration: euvolemic  " What Type Of Note Output Would You Prefer (Optional)?: Standard Output How Severe Are Your Spot(S)?: moderate Have Your Spot(S) Been Treated In The Past?: has not been treated Hpi Title: Evaluation of Skin Lesions

## 2022-10-28 NOTE — DISCHARGE SUMMARY
[facility]  Discharge Note  Short Stay    Admit Date: 6/30/2017    Discharge Date and Time: 06/30/2017     Attending Physician: Jose Guadalupe Montanez MD     Discharge Provider: Jose Guadalupe Montanez    Diagnoses:  Active Hospital Problems    Diagnosis  POA    *AML (acute myeloblastic leukemia) [C92.00]  Yes      Resolved Hospital Problems    Diagnosis Date Resolved POA   No resolved problems to display.       Discharged Condition: stable    Hospital Course: Patient was admitted for an outpatient procedure and tolerated the procedure well with no complications.    Right subclavian mediport    Final Diagnoses: Same as principal problem.    Disposition: Home or Self Care    Follow up/Patient Instructions:    Medications:  Reconciled Home Medications:   Current Discharge Medication List      START taking these medications    Details   hydrocodone-acetaminophen 5-325mg (NORCO) 5-325 mg per tablet Take 1 tablet by mouth every 6 (six) hours as needed for Pain.  Qty: 20 tablet, Refills: 0         CONTINUE these medications which have NOT CHANGED    Details   aspirin 81 MG Chew Take 81 mg by mouth once daily.      ondansetron (ZOFRAN) 8 MG tablet Take 1 tablet (8 mg total) by mouth every 12 (twelve) hours as needed for Nausea.  Qty: 30 tablet, Refills: 2    Associated Diagnoses: Acute myeloid leukemia not having achieved remission      valacyclovir (VALTREX) 500 MG tablet Take 1 tablet (500 mg total) by mouth once daily.  Qty: 30 tablet, Refills: 3    Associated Diagnoses: Acute myeloid leukemia not having achieved remission             Discharge Procedure Orders  Diet general     Call MD for:  temperature >100.4     Call MD for:  persistent nausea and vomiting     Call MD for:  severe uncontrolled pain     Call MD for:  difficulty breathing, headache or visual disturbances     Call MD for:  redness, tenderness, or signs of infection (pain, swelling, redness, odor or green/yellow discharge around incision site)      Activity as tolerated     Remove dressing in 72 hours   Order Comments: May shower with the clear dressing in place and once it is removed  Remove the steri strips when the begin to fall off       Follow-up Information     [facility].    Specialty:  Hematology and Oncology  Why:  as scheduled for chemotherapy  Contact information:  [address]  [facility] [address] [phone]                 Discharge Procedure Orders (must include Diet, Follow-up, Activity):    Discharge Procedure Orders (must include Diet, Follow-up, Activity)  Diet general     Call MD for:  temperature >100.4     Call MD for:  persistent nausea and vomiting     Call MD for:  severe uncontrolled pain     Call MD for:  difficulty breathing, headache or visual disturbances     Call MD for:  redness, tenderness, or signs of infection (pain, swelling, redness, odor or green/yellow discharge around incision site)     Activity as tolerated     Remove dressing in 72 hours   Order Comments: May shower with the clear dressing in place and once it is removed  Remove the steri strips when the begin to fall off         yes

## 2023-03-25 NOTE — NURSING
1115 Platelet transfusion completed. Tushar Jacobo here to take patient to pre-op department. Iv remains to left antecubital area. Short report given to nurse.   20

## (undated) DEVICE — SEE MEDLINE ITEM 152739

## (undated) DEVICE — KIT PTFE INTRO 8FR

## (undated) DEVICE — SEE MEDLINE ITEM 152622

## (undated) DEVICE — APPLICATOR CHLORAPREP ORN 26ML

## (undated) DEVICE — SUT MONOCRYL 4.0 PS2 CP496G

## (undated) DEVICE — SEE MEDLINE ITEM 146420

## (undated) DEVICE — DECANTER VIAL ASEPTIC TRANSFER

## (undated) DEVICE — SEE MEDLINE ITEM 157117

## (undated) DEVICE — SHEET THYROID W/ISO-BAC

## (undated) DEVICE — DRESSING TRANS 4X4 TEGADERM

## (undated) DEVICE — DRAPE MOBILE C-ARM

## (undated) DEVICE — COVER OVERHEAD SURG LT BLUE

## (undated) DEVICE — ADHESIVE MASTISOL VIAL 48/BX

## (undated) DEVICE — MANIFOLD 4 PORT

## (undated) DEVICE — SUT VICRYL 3-0 27 SH

## (undated) DEVICE — NDL SAFETY 25G X 1.5 ECLIPSE

## (undated) DEVICE — SYR 10CC LUER LOCK

## (undated) DEVICE — SUT VICRYL 0 SH

## (undated) DEVICE — GLOVE SURG BIOGEL LATEX SZ 7.5

## (undated) DEVICE — CLOSURE SKIN STERI STRIP 1/2X4

## (undated) DEVICE — GAUZE SPONGE 4X4 12PLY

## (undated) DEVICE — ELECTRODE REM PLYHSV RETURN 9

## (undated) DEVICE — SEE MEDLINE ITEM 157027